# Patient Record
Sex: FEMALE | Race: WHITE | NOT HISPANIC OR LATINO | Employment: OTHER | URBAN - METROPOLITAN AREA
[De-identification: names, ages, dates, MRNs, and addresses within clinical notes are randomized per-mention and may not be internally consistent; named-entity substitution may affect disease eponyms.]

---

## 2024-09-10 ENCOUNTER — PATIENT MESSAGE (OUTPATIENT)
Dept: SURGERY | Facility: CLINIC | Age: 74
End: 2024-09-10

## 2024-09-10 PROBLEM — C50.919 INVASIVE LOBULAR CARCINOMA OF BREAST IN FEMALE: Status: ACTIVE | Noted: 2024-09-10

## 2024-09-16 ENCOUNTER — HOSPITAL ENCOUNTER (OUTPATIENT)
Dept: RADIOLOGY | Facility: HOSPITAL | Age: 74
Discharge: HOME | End: 2024-09-16
Attending: NURSE PRACTITIONER
Payer: MEDICARE

## 2024-09-16 DIAGNOSIS — C50.919 BREAST CANCER: ICD-10-CM

## 2024-09-16 PROCEDURE — 25500020 PHARM REV CODE 255: Performed by: NURSE PRACTITIONER

## 2024-09-16 PROCEDURE — C8937 CAD BREAST MRI: HCPCS | Mod: TC

## 2024-09-16 PROCEDURE — 77049 MRI BREAST C-+ W/CAD BI: CPT | Mod: TC

## 2024-09-16 PROCEDURE — 77049 MRI BREAST C-+ W/CAD BI: CPT | Mod: 26,,, | Performed by: RADIOLOGY

## 2024-09-16 PROCEDURE — A9577 INJ MULTIHANCE: HCPCS | Performed by: NURSE PRACTITIONER

## 2024-09-16 RX ADMIN — GADOBENATE DIMEGLUMINE 20 ML: 529 INJECTION, SOLUTION INTRAVENOUS at 10:09

## 2024-09-16 NOTE — PROGRESS NOTES
Breast Surgery  [facility]  Department of Surgery      REFERRING PROVIDER: Bruce Artis MD  [address]  [address]  [place] [address]    Chief Complaint: New Patient and Breast Cancer      Subjective:      Patient ID: Beth Cantu is a 74 y.o. female who presents with newly diagnosed infiltrating lobular carcinoma of the right breast, Grade 1, ER +, [place] +, HER2 -.    She initially had an abnormal screening mammogram (12/28/2023) BI-RADS 0; immediate diagnostic mammogram 02/2024 significant for Right breast asymmetry at the outer middle position, probably benign, 6 month f/u recommended.     6 month Follow-up mammogram (08/29/24) showed 1.2 cm of focal asymmetry with architectural distortion corresponding to previously suspicious area. US (08/29/34) showed 1.1 cm irregular not parallel hypoechoic mass with indistinct margins at 10:00 o'clock axis 4 cm from the nipple. These findings represent an interval change from previous imaging. MRI (09/16/24) shows 1.4 cm irregular mass in the UOQ, middle depth 3.5 CFN and 1.5 CM from the skin correlating with the known cancer. There is also a 0.6 cm oval mass with circumscribed margins in the UIQ of the LEFT breast, 6 CFM and 1.5 cm from the skin. Biopsy of the left breast findings is recommended.     A ultrasound guided biopsy was performed on 09/04/24 with pathology revealing infiltrating lobular carcinoma of the breast.     Patient does routinely do self breast exams.  Patient has not noted a change on breast exam.  Patient denies nipple discharge. Patient has to previous breast biopsy - Right breast in the 90's, after hormone therapy, aspirated, resolved after getting of hormone theraoy. Patient denies a personal history of breast cancer.    Findings at that time were the following:   Tumor size: 1.4 cm   Tumor ndgndrndanddndend:nd nd2nd Estrogen Receptor: +   Progesterone Receptor: +   Her-2 linette: -   Lymph node status: clinically negative  Lymphatic  invasion: negative     GYN History:  Age of menarche was 10. Age of menopause was 36 (hysterectomy, partial).  Patient admits to hormonal therapy (6 months). Patient is . Age of first live birth was 26. Patient did breast feed (1 month)    Past Medical History:   Diagnosis Date    Absence of both cervix and uterus, acquired     TVH    Basal cell carcinoma (BCC)         Breast cyst     Cancer     right breast    H/O bone density study 2014    Normal    H/O colonoscopy     Cologard done 10/2016, Negative    H/O mammogram     Normal      Herpes simplex virus (HSV) infection     Hypothyroidism associated with surgical procedure     Migraines     MVP (mitral valve prolapse)     Osteoarthritis     Overweight     Thyroid disease     Vaginal Pap smear     Normal     Past Surgical History:   Procedure Laterality Date    ARTHROPLASTY, KNEE, TOTAL, SIGHT ASSISTED Right 2021    Procedure: ARTHROPLASTY, KNEE, TOTAL, SIGHT ASSISTED;  Surgeon: Dionicio Fonseca MD;  Location: [facility];  Service: Orthopedics;  Laterality: Right;    BREAST CYST ASPIRATION      CHOLECYSTECTOMY      COLONOSCOPY      DILATION AND CURETTAGE OF UTERUS      Missed Ab    HYSTERECTOMY      TVH - menorrhagia    JOINT REPLACEMENT      left knee    KNEE SURGERY Left 2013    Replacement - In [place]    THYROIDECTOMY  2013    Partial 1973    TONSILLECTOMY      TYMPANOSTOMY TUBE PLACEMENT Left      No current facility-administered medications on file prior to visit.     Current Outpatient Medications on File Prior to Visit   Medication Sig Dispense Refill    levothyroxine (SYNTHROID) 150 MCG tablet Take 150 mcg by mouth once daily.      multivitamin capsule Take 1 capsule by mouth once daily.      ondansetron (ZOFRAN-ODT) 8 MG TbDL Take 1 tablet (8 mg total) by mouth every 8 (eight) hours as needed (Nausea). 20 tablet 1    propranoloL (INDERAL LA) 120 MG 24 hr capsule Inderal LA   120 mg      sumatriptan (IMITREX) 25 MG  Tab       levothyroxine (SYNTHROID, LEVOTHROID) 175 MCG tablet Synthroid 175 mcg tablet   Take 1 tablet every day by oral route.       Social History     Socioeconomic History    Marital status:      Spouse name: Klaus    Number of children: 3   Tobacco Use    Smoking status: Never    Smokeless tobacco: Never   Substance and Sexual Activity    Alcohol use: Yes     Alcohol/week: 7.0 standard drinks of alcohol     Types: 7 Glasses of wine per week     Comment: Socially - Wine    Drug use: No    Sexual activity: Yes     Partners: Male     Birth control/protection: Surgical     Comment: :  Klaus   Social History Narrative        Retired    3 adult sons (Luis, Philip, Beny), 6 grandchildren     Social Drivers of Health     Financial Resource Strain: Patient Declined (10/4/2024)    Overall Financial Resource Strain (CARDIA)     Difficulty of Paying Living Expenses: Patient declined   Food Insecurity: Patient Declined (10/4/2024)    Hunger Vital Sign     Worried About Running Out of Food in the Last Year: Patient declined     Ran Out of Food in the Last Year: Patient declined     Family History   Problem Relation Name Age of Onset    Cirrhosis Father      Alzheimer's disease Father      Emphysema Father      Pneumonia Mother      Cancer Maternal Aunt      Cancer Maternal Uncle      Cancer Maternal Cousin      Breast cancer Maternal Cousin  30        mastectomy    Cancer Maternal Cousin      Cancer Maternal Cousin      Colon cancer Neg Hx      Ovarian cancer Neg Hx          Review of Systems   Constitutional:  Negative for chills and fever.   Respiratory:  Negative for chest tightness and shortness of breath.    Cardiovascular:  Negative for chest pain and palpitations.   Gastrointestinal:  Negative for abdominal pain and blood in stool.   Skin:  Negative for rash and wound.   Neurological:  Positive for headaches (migranes, chronic). Negative for syncope.   Psychiatric/Behavioral:  Negative for  "confusion.      Objective:   /66   Pulse 80   Ht 5' 7" (1.702 m)   Wt 106.1 kg (234 lb)   LMP 02/17/1986 (Approximate) Comment: TVH 1986  BMI 36.65 kg/m²     Physical Exam   HENT:   Head: Normocephalic and atraumatic.   Eyes: Conjunctivae are normal. No scleral icterus.   Cardiovascular:  Normal rate, regular rhythm and normal pulses.            Pulmonary/Chest: Effort normal and breath sounds normal. No accessory muscle usage or stridor. No respiratory distress. She has no wheezes. Right breast exhibits no inverted nipple, no mass, no nipple discharge and no skin change. Left breast exhibits no inverted nipple, no mass, no nipple discharge and no skin change.   Abdominal: Soft. Normal appearance. She exhibits no mass.   Musculoskeletal: No deformity. Lymphadenopathy:      Cervical: No cervical adenopathy.      Upper Body:      Right upper body: No supraclavicular or axillary adenopathy.      Left upper body: No supraclavicular or axillary adenopathy.     Neurological: She is alert.   Skin: Skin is warm and dry.     Psychiatric: Mood and judgment normal.       Radiology review: Images personally reviewed by me in the clinic.   Result:   Mammo Digital Diagnostic Right with Ankit  US Breast Right Limited     History:  Follow-up for a right breast finding initially described on screening mammogram December 28, 2023.     Films Compared:  Prior images (if available) were compared.     Findings:  This procedure was performed using tomosynthesis. Computer-aided detection was utilized in the interpretation of this examination.     Mammo Digital Diagnostic Right with Ankit  The right breast has scattered areas of fibroglandular density.      In the right breast upper outer quadrant middle depth, there is a focal asymmetry with architectural distortion spanning 1.2 cm.  This corresponds to the previously described finding recommended for follow-up, and represents an interval change.  This finding remains subtle by " mammogram and is visible mainly in the CC projection.     Limited right breast ultrasound:   In the right breast 10:00 o'clock axis 4 cm from the nipple, there is an irregular not parallel hypoechoic mass with indistinct margins measuring 1.1 x 1.0 x 0.9 cm.  Associated posterior acoustic shadowing and rim hypervascularity.  This corresponds to the right breast mammographic finding and finding recommended for follow-up, and represents an interval change as this finding was not previously seen by ultrasound.     No right axillary adenopathy.     Impression:  Since February 6, 2024, the previously described right breast finding is more conspicuous but still subtle by mammogram, and now clearly visualized on ultrasound as a mass.  BI-RADS 4: Suspicious.  Recommend ultrasound-guided biopsy.     BI-RADS Category:   Overall: 4 - Suspicious     Recommendation:  Ultrasound-guided biopsy for the right breast 10:00 o'clock axis 4 cm from the nipple mass.       The findings and recommendations were discussed directly with her by Dr. CUCA Norris at the time of interpretation.     Your estimated lifetime risk of breast cancer (to age 85) based on Tyrer-Cuzick risk assessment model is 2.62%.  According to the American Cancer Society, patients with a lifetime breast cancer risk of 20% or higher might benefit from supplemental screening tests, such as screening breast MRI.    MRI (09/16/24)  Result:   MRI Breast w/wo Contrast, w/CAD, Bilateral     History:  Patient is 74 y.o. and is seen for biopsy proven right breast cancer.        Films Compared:  Prior images (if available) were compared.     Technique:  A routine breast MRI was performed with a dedicated breast coil. Pre-contrast STIR were acquired. Then, pre and post contrast T1 weighted fat saturated images were acquired and subtracted with MIP reconstruction.  10mL of intravenous gadolinium contrast was administered. The study was reviewed with [employer] software.      Findings:  The breasts have scattered fibroglandular tissue. The background parenchymal enhancement is minimal and symmetric.      There is no suspicious axillary or internal mammary chain lymphadenopathy.     Left  There is a 0.6 cm x 0.5 cm x 0.6 cm oval mass with circumscribed margins and homogeneous internal enhancement seen in the upper inner quadrant of the left breast in the middle depth, 6 cm from the nipple and 1.5 cm from the skin, which is isointense on T2 weighted images. Kinetics initial phase is slow. Delayed phase is persistent.  This mass is best appreciated on axial postcontrast image 63 of 128.      Right  There is a 1.4 cm x 1.1 cm x 1.1 cm irregularly shaped mass with irregular margins and heterogeneous internal enhancement seen in the upper outer quadrant of the right breast in the middle depth, 3.4 cm from the nipple and 1.5 cm from the skin. The mass correlates with known cancer. Kinetics initial phase is slow. Delayed phase is persistent. There is an associated clip.      Impression:  Left  Mass: Left breast 0.6 cm x 0.5 cm x 0.6 cm mass at the upper inner position and middle depth. Assessment: 4 - Suspicious finding. Biopsy is recommended.      Right  Mass: Right breast 1.4 cm x 1.1 cm x 1.1 cm mass at the upper outer position and middle depth. Assessment: 6 - Known biopsy, proven malignancy. Surgical Consult is recommended.      BI-RADS Category:   Overall: 4 - Suspicious     Recommendation:  Biopsy is recommended.  Surgical Consult is recommended for the right breast.    Assessment:       1. Malignant neoplasm of upper-outer quadrant of right breast in female, estrogen receptor positive    2. Invasive lobular carcinoma of breast in female        Plan:     Options for management were discussed with the patient and her family. We reviewed the existing data noting the equivalency of breast conserving surgery with radiation therapy and mastectomy. We also reviewed the guidelines of the  National Comprehensive Cancer Network for Stage 1 breast carcinoma. We discussed the need for lumpectomy margins 1to be negative for carcinoma, the necessity for postoperative radiation therapy after breast conservation in most cases, the possibility of a failed or false negative sentinel lymph node biopsy and the potential need for complete lymphadenectomy for a failed or positive sentinel lymph node biopsy were fully discussed. In the setting of mastectomy, delayed or immediate reconstruction options are available and were discussed.     In the setting of lumpectomy, radiation therapy would be recommended majority of the time.  The duration and treatment side effects were discussed with the patient.  This will coordinated with the radiation oncologist pending final pathology.    We also discussed the role of systemic therapy in the treatment of early stage breast cancer.  We discussed that this is based on tumor biology and vicki status and will be determined based on final pathology.  We discussed that if the cancer is hormone positive, endocrine therapy would be recommended in most cases and its use can reduce the risk of recurrence as well as improve survival. Side effects of treatment were briefly discussed. We also discussed the potential role for chemotherapy based on a number of factors such as tumor phenotype (ER+ vs. triple negative vs. [id number]+) and this would be determined in coordination with the medical oncologist.    MRI  Genetics  The patient, in consultation with her family, has elected to proceed with right partial mastectomy and sentinel lymph node biopsy. The operative risks of bleeding, infection, recurrence, scarring, and anesthetic complications and the possibility of requiring further surgery were all noted and informed consent obtained.    Surgery scheduled. Follow-up in clinic roughly 14 days after surgery.     Patient was educated on breast cancer, receptors, wire localization lumpectomy,  mastectomy, sentinel lymph node mapping and biopsy, axillary lymph node dissection, reconstruction, breast prosthesis with post-mastectomy bra and radiation therapy. Patient was given patient information binder including [facility] breast cancer treatment brochure.  All her questions were answered.      Total time spent with the patient: 65.  45 minutes of face to face consultation and 20 minutes of chart review and coordination of care.

## 2024-09-16 NOTE — H&P (VIEW-ONLY)
Breast Surgery  [facility]  Department of Surgery      REFERRING PROVIDER: Bruce Artis MD  [address]  [address]  [place] [address]    Chief Complaint: New Patient and Breast Cancer      Subjective:      Patient ID: Beth Cantu is a 74 y.o. female who presents with newly diagnosed infiltrating lobular carcinoma of the right breast, Grade 1, ER +, IL +, HER2 -.    She initially had an abnormal screening mammogram (12/28/2023) BI-RADS 0; immediate diagnostic mammogram 02/2024 significant for Right breast asymmetry at the outer middle position, probably benign, 6 month f/u recommended.     6 month Follow-up mammogram (08/29/24) showed 1.2 cm of focal asymmetry with architectural distortion corresponding to previously suspicious area. US (08/29/34) showed 1.1 cm irregular not parallel hypoechoic mass with indistinct margins at 10:00 o'clock axis 4 cm from the nipple. These findings represent an interval change from previous imaging. MRI (09/16/24) shows 1.4 cm irregular mass in the UOQ, middle depth 3.5 CFN and 1.5 CM from the skin correlating with the known cancer. There is also a 0.6 cm oval mass with circumscribed margins in the UIQ of the LEFT breast, 6 CFM and 1.5 cm from the skin. Biopsy of the left breast findings is recommended.     A ultrasound guided biopsy was performed on 09/04/24 with pathology revealing infiltrating lobular carcinoma of the breast.     Patient does routinely do self breast exams.  Patient has not noted a change on breast exam.  Patient denies nipple discharge. Patient has to previous breast biopsy - Right breast in the 90's, after hormone therapy, aspirated, resolved after getting of hormone theraoy. Patient denies a personal history of breast cancer.    Findings at that time were the following:   Tumor size: 1.4 cm   Tumor ndgndrndanddndend:nd nd2nd Estrogen Receptor: +   Progesterone Receptor: +   Her-2 linette: -   Lymph node status: clinically negative  Lymphatic  invasion: negative     GYN History:  Age of menarche was 10. Age of menopause was 36 (hysterectomy, partial).  Patient admits to hormonal therapy (6 months). Patient is . Age of first live birth was 26. Patient did breast feed (1 month)    Past Medical History:   Diagnosis Date    Absence of both cervix and uterus, acquired     TVH    Basal cell carcinoma (BCC)         Breast cyst     Cancer     right breast    H/O bone density study 2014    Normal    H/O colonoscopy     Cologard done 10/2016, Negative    H/O mammogram     Normal      Herpes simplex virus (HSV) infection     Hypothyroidism associated with surgical procedure     Migraines     MVP (mitral valve prolapse)     Osteoarthritis     Overweight     Thyroid disease     Vaginal Pap smear     Normal     Past Surgical History:   Procedure Laterality Date    ARTHROPLASTY, KNEE, TOTAL, SIGHT ASSISTED Right 2021    Procedure: ARTHROPLASTY, KNEE, TOTAL, SIGHT ASSISTED;  Surgeon: Dionicio Fonseca MD;  Location: [facility];  Service: Orthopedics;  Laterality: Right;    BREAST CYST ASPIRATION      CHOLECYSTECTOMY      COLONOSCOPY      DILATION AND CURETTAGE OF UTERUS      Missed Ab    HYSTERECTOMY      TVH - menorrhagia    JOINT REPLACEMENT      left knee    KNEE SURGERY Left 2013    Replacement - In [place]    THYROIDECTOMY  2013    Partial 1973    TONSILLECTOMY      TYMPANOSTOMY TUBE PLACEMENT Left      No current facility-administered medications on file prior to visit.     Current Outpatient Medications on File Prior to Visit   Medication Sig Dispense Refill    levothyroxine (SYNTHROID) 150 MCG tablet Take 150 mcg by mouth once daily.      multivitamin capsule Take 1 capsule by mouth once daily.      ondansetron (ZOFRAN-ODT) 8 MG TbDL Take 1 tablet (8 mg total) by mouth every 8 (eight) hours as needed (Nausea). 20 tablet 1    propranoloL (INDERAL LA) 120 MG 24 hr capsule Inderal LA   120 mg      sumatriptan (IMITREX) 25 MG  Tab       levothyroxine (SYNTHROID, LEVOTHROID) 175 MCG tablet Synthroid 175 mcg tablet   Take 1 tablet every day by oral route.       Social History     Socioeconomic History    Marital status:      Spouse name: Klaus    Number of children: 3   Tobacco Use    Smoking status: Never    Smokeless tobacco: Never   Substance and Sexual Activity    Alcohol use: Yes     Alcohol/week: 7.0 standard drinks of alcohol     Types: 7 Glasses of wine per week     Comment: Socially - Wine    Drug use: No    Sexual activity: Yes     Partners: Male     Birth control/protection: Surgical     Comment: :  Klaus   Social History Narrative        Retired    3 adult sons (Luis, Philip, Beny), 6 grandchildren     Social Drivers of Health     Financial Resource Strain: Patient Declined (10/4/2024)    Overall Financial Resource Strain (CARDIA)     Difficulty of Paying Living Expenses: Patient declined   Food Insecurity: Patient Declined (10/4/2024)    Hunger Vital Sign     Worried About Running Out of Food in the Last Year: Patient declined     Ran Out of Food in the Last Year: Patient declined     Family History   Problem Relation Name Age of Onset    Cirrhosis Father      Alzheimer's disease Father      Emphysema Father      Pneumonia Mother      Cancer Maternal Aunt      Cancer Maternal Uncle      Cancer Maternal Cousin      Breast cancer Maternal Cousin  30        mastectomy    Cancer Maternal Cousin      Cancer Maternal Cousin      Colon cancer Neg Hx      Ovarian cancer Neg Hx          Review of Systems   Constitutional:  Negative for chills and fever.   Respiratory:  Negative for chest tightness and shortness of breath.    Cardiovascular:  Negative for chest pain and palpitations.   Gastrointestinal:  Negative for abdominal pain and blood in stool.   Skin:  Negative for rash and wound.   Neurological:  Positive for headaches (migranes, chronic). Negative for syncope.   Psychiatric/Behavioral:  Negative for  "confusion.      Objective:   /66   Pulse 80   Ht 5' 7" (1.702 m)   Wt 106.1 kg (234 lb)   LMP 02/17/1986 (Approximate) Comment: TVH 1986  BMI 36.65 kg/m²     Physical Exam   HENT:   Head: Normocephalic and atraumatic.   Eyes: Conjunctivae are normal. No scleral icterus.   Cardiovascular:  Normal rate, regular rhythm and normal pulses.            Pulmonary/Chest: Effort normal and breath sounds normal. No accessory muscle usage or stridor. No respiratory distress. She has no wheezes. Right breast exhibits no inverted nipple, no mass, no nipple discharge and no skin change. Left breast exhibits no inverted nipple, no mass, no nipple discharge and no skin change.   Abdominal: Soft. Normal appearance. She exhibits no mass.   Musculoskeletal: No deformity. Lymphadenopathy:      Cervical: No cervical adenopathy.      Upper Body:      Right upper body: No supraclavicular or axillary adenopathy.      Left upper body: No supraclavicular or axillary adenopathy.     Neurological: She is alert.   Skin: Skin is warm and dry.     Psychiatric: Mood and judgment normal.       Radiology review: Images personally reviewed by me in the clinic.   Result:   Mammo Digital Diagnostic Right with Ankit  US Breast Right Limited     History:  Follow-up for a right breast finding initially described on screening mammogram December 28, 2023.     Films Compared:  Prior images (if available) were compared.     Findings:  This procedure was performed using tomosynthesis. Computer-aided detection was utilized in the interpretation of this examination.     Mammo Digital Diagnostic Right with Ankit  The right breast has scattered areas of fibroglandular density.      In the right breast upper outer quadrant middle depth, there is a focal asymmetry with architectural distortion spanning 1.2 cm.  This corresponds to the previously described finding recommended for follow-up, and represents an interval change.  This finding remains subtle by " mammogram and is visible mainly in the CC projection.     Limited right breast ultrasound:   In the right breast 10:00 o'clock axis 4 cm from the nipple, there is an irregular not parallel hypoechoic mass with indistinct margins measuring 1.1 x 1.0 x 0.9 cm.  Associated posterior acoustic shadowing and rim hypervascularity.  This corresponds to the right breast mammographic finding and finding recommended for follow-up, and represents an interval change as this finding was not previously seen by ultrasound.     No right axillary adenopathy.     Impression:  Since February 6, 2024, the previously described right breast finding is more conspicuous but still subtle by mammogram, and now clearly visualized on ultrasound as a mass.  BI-RADS 4: Suspicious.  Recommend ultrasound-guided biopsy.     BI-RADS Category:   Overall: 4 - Suspicious     Recommendation:  Ultrasound-guided biopsy for the right breast 10:00 o'clock axis 4 cm from the nipple mass.       The findings and recommendations were discussed directly with her by Dr. CUCA Norris at the time of interpretation.     Your estimated lifetime risk of breast cancer (to age 85) based on Tyrer-Cuzick risk assessment model is 2.62%.  According to the American Cancer Society, patients with a lifetime breast cancer risk of 20% or higher might benefit from supplemental screening tests, such as screening breast MRI.    MRI (09/16/24)  Result:   MRI Breast w/wo Contrast, w/CAD, Bilateral     History:  Patient is 74 y.o. and is seen for biopsy proven right breast cancer.        Films Compared:  Prior images (if available) were compared.     Technique:  A routine breast MRI was performed with a dedicated breast coil. Pre-contrast STIR were acquired. Then, pre and post contrast T1 weighted fat saturated images were acquired and subtracted with MIP reconstruction.  10mL of intravenous gadolinium contrast was administered. The study was reviewed with DartPoints software.      Findings:  The breasts have scattered fibroglandular tissue. The background parenchymal enhancement is minimal and symmetric.      There is no suspicious axillary or internal mammary chain lymphadenopathy.     Left  There is a 0.6 cm x 0.5 cm x 0.6 cm oval mass with circumscribed margins and homogeneous internal enhancement seen in the upper inner quadrant of the left breast in the middle depth, 6 cm from the nipple and 1.5 cm from the skin, which is isointense on T2 weighted images. Kinetics initial phase is slow. Delayed phase is persistent.  This mass is best appreciated on axial postcontrast image 63 of 128.      Right  There is a 1.4 cm x 1.1 cm x 1.1 cm irregularly shaped mass with irregular margins and heterogeneous internal enhancement seen in the upper outer quadrant of the right breast in the middle depth, 3.4 cm from the nipple and 1.5 cm from the skin. The mass correlates with known cancer. Kinetics initial phase is slow. Delayed phase is persistent. There is an associated clip.      Impression:  Left  Mass: Left breast 0.6 cm x 0.5 cm x 0.6 cm mass at the upper inner position and middle depth. Assessment: 4 - Suspicious finding. Biopsy is recommended.      Right  Mass: Right breast 1.4 cm x 1.1 cm x 1.1 cm mass at the upper outer position and middle depth. Assessment: 6 - Known biopsy, proven malignancy. Surgical Consult is recommended.      BI-RADS Category:   Overall: 4 - Suspicious     Recommendation:  Biopsy is recommended.  Surgical Consult is recommended for the right breast.    Assessment:       1. Malignant neoplasm of upper-outer quadrant of right breast in female, estrogen receptor positive    2. Invasive lobular carcinoma of breast in female        Plan:     Options for management were discussed with the patient and her family. We reviewed the existing data noting the equivalency of breast conserving surgery with radiation therapy and mastectomy. We also reviewed the guidelines of the  National Comprehensive Cancer Network for Stage 1 breast carcinoma. We discussed the need for lumpectomy margins 1to be negative for carcinoma, the necessity for postoperative radiation therapy after breast conservation in most cases, the possibility of a failed or false negative sentinel lymph node biopsy and the potential need for complete lymphadenectomy for a failed or positive sentinel lymph node biopsy were fully discussed. In the setting of mastectomy, delayed or immediate reconstruction options are available and were discussed.     In the setting of lumpectomy, radiation therapy would be recommended majority of the time.  The duration and treatment side effects were discussed with the patient.  This will coordinated with the radiation oncologist pending final pathology.    We also discussed the role of systemic therapy in the treatment of early stage breast cancer.  We discussed that this is based on tumor biology and vicki status and will be determined based on final pathology.  We discussed that if the cancer is hormone positive, endocrine therapy would be recommended in most cases and its use can reduce the risk of recurrence as well as improve survival. Side effects of treatment were briefly discussed. We also discussed the potential role for chemotherapy based on a number of factors such as tumor phenotype (ER+ vs. triple negative vs. Kkz2hch+) and this would be determined in coordination with the medical oncologist.    MRI  Genetics  The patient, in consultation with her family, has elected to proceed with right partial mastectomy and sentinel lymph node biopsy. The operative risks of bleeding, infection, recurrence, scarring, and anesthetic complications and the possibility of requiring further surgery were all noted and informed consent obtained.    Surgery scheduled. Follow-up in clinic roughly 14 days after surgery.     Patient was educated on breast cancer, receptors, wire localization lumpectomy,  mastectomy, sentinel lymph node mapping and biopsy, axillary lymph node dissection, reconstruction, breast prosthesis with post-mastectomy bra and radiation therapy. Patient was given patient information binder including [facility] breast cancer treatment brochure.  All her questions were answered.      Total time spent with the patient: 65.  45 minutes of face to face consultation and 20 minutes of chart review and coordination of care.

## 2024-09-17 ENCOUNTER — DOCUMENTATION ONLY (OUTPATIENT)
Dept: SURGERY | Facility: CLINIC | Age: 74
End: 2024-09-17
Payer: MEDICARE

## 2024-09-17 ENCOUNTER — LAB VISIT (OUTPATIENT)
Dept: LAB | Facility: HOSPITAL | Age: 74
End: 2024-09-17
Attending: SURGERY
Payer: MEDICARE

## 2024-09-17 ENCOUNTER — OFFICE VISIT (OUTPATIENT)
Dept: SURGERY | Facility: CLINIC | Age: 74
End: 2024-09-17
Payer: MEDICARE

## 2024-09-17 VITALS
HEIGHT: 67 IN | BODY MASS INDEX: 36.73 KG/M2 | DIASTOLIC BLOOD PRESSURE: 66 MMHG | SYSTOLIC BLOOD PRESSURE: 119 MMHG | WEIGHT: 234 LBS | HEART RATE: 80 BPM

## 2024-09-17 DIAGNOSIS — Z17.0 MALIGNANT NEOPLASM OF UPPER-OUTER QUADRANT OF RIGHT BREAST IN FEMALE, ESTROGEN RECEPTOR POSITIVE: ICD-10-CM

## 2024-09-17 DIAGNOSIS — C50.411 MALIGNANT NEOPLASM OF UPPER-OUTER QUADRANT OF RIGHT BREAST IN FEMALE, ESTROGEN RECEPTOR POSITIVE: Primary | ICD-10-CM

## 2024-09-17 DIAGNOSIS — C50.919 INVASIVE LOBULAR CARCINOMA OF BREAST IN FEMALE: ICD-10-CM

## 2024-09-17 DIAGNOSIS — C50.411 MALIGNANT NEOPLASM OF UPPER-OUTER QUADRANT OF RIGHT BREAST IN FEMALE, ESTROGEN RECEPTOR POSITIVE: ICD-10-CM

## 2024-09-17 DIAGNOSIS — Z17.0 MALIGNANT NEOPLASM OF UPPER-OUTER QUADRANT OF RIGHT BREAST IN FEMALE, ESTROGEN RECEPTOR POSITIVE: Primary | ICD-10-CM

## 2024-09-17 LAB
ALBUMIN SERPL BCP-MCNC: 3.9 G/DL (ref 3.5–5.2)
ALP SERPL-CCNC: 88 U/L (ref 55–135)
ALT SERPL W/O P-5'-P-CCNC: 29 U/L (ref 10–44)
ANION GAP SERPL CALC-SCNC: 8 MMOL/L (ref 8–16)
AST SERPL-CCNC: 21 U/L (ref 10–40)
BASOPHILS # BLD AUTO: 0.09 K/UL (ref 0–0.2)
BASOPHILS NFR BLD: 1.2 % (ref 0–1.9)
BILIRUB SERPL-MCNC: 0.5 MG/DL (ref 0.1–1)
BUN SERPL-MCNC: 9 MG/DL (ref 8–23)
CALCIUM SERPL-MCNC: 8.8 MG/DL (ref 8.7–10.5)
CHLORIDE SERPL-SCNC: 104 MMOL/L (ref 95–110)
CO2 SERPL-SCNC: 23 MMOL/L (ref 23–29)
CREAT SERPL-MCNC: 0.8 MG/DL (ref 0.5–1.4)
DIFFERENTIAL METHOD BLD: ABNORMAL
EOSINOPHIL # BLD AUTO: 0.2 K/UL (ref 0–0.5)
EOSINOPHIL NFR BLD: 2.3 % (ref 0–8)
ERYTHROCYTE [DISTWIDTH] IN BLOOD BY AUTOMATED COUNT: 12.7 % (ref 11.5–14.5)
EST. GFR  (NO RACE VARIABLE): >60 ML/MIN/1.73 M^2
GLUCOSE SERPL-MCNC: 97 MG/DL (ref 70–110)
HCT VFR BLD AUTO: 38.2 % (ref 37–48.5)
HGB BLD-MCNC: 13.9 G/DL (ref 12–16)
IMM GRANULOCYTES # BLD AUTO: 0.03 K/UL (ref 0–0.04)
IMM GRANULOCYTES NFR BLD AUTO: 0.4 % (ref 0–0.5)
LYMPHOCYTES # BLD AUTO: 2.1 K/UL (ref 1–4.8)
LYMPHOCYTES NFR BLD: 28 % (ref 18–48)
MCH RBC QN AUTO: 34.8 PG (ref 27–31)
MCHC RBC AUTO-ENTMCNC: 36.4 G/DL (ref 32–36)
MCV RBC AUTO: 96 FL (ref 82–98)
MONOCYTES # BLD AUTO: 1 K/UL (ref 0.3–1)
MONOCYTES NFR BLD: 13.3 % (ref 4–15)
NEUTROPHILS # BLD AUTO: 4.1 K/UL (ref 1.8–7.7)
NEUTROPHILS NFR BLD: 54.8 % (ref 38–73)
NRBC BLD-RTO: 0 /100 WBC
PLATELET # BLD AUTO: 280 K/UL (ref 150–450)
PMV BLD AUTO: 12.3 FL (ref 9.2–12.9)
POTASSIUM SERPL-SCNC: 4.3 MMOL/L (ref 3.5–5.1)
PROT SERPL-MCNC: 6.8 G/DL (ref 6–8.4)
RBC # BLD AUTO: 3.99 M/UL (ref 4–5.4)
SODIUM SERPL-SCNC: 135 MMOL/L (ref 136–145)
WBC # BLD AUTO: 7.43 K/UL (ref 3.9–12.7)

## 2024-09-17 PROCEDURE — 99999 PR PBB SHADOW E&M-EST. PATIENT-LVL IV: CPT | Mod: PBBFAC,,, | Performed by: SURGERY

## 2024-09-17 PROCEDURE — 99214 OFFICE O/P EST MOD 30 MIN: CPT | Mod: PBBFAC | Performed by: SURGERY

## 2024-09-17 PROCEDURE — 36415 COLL VENOUS BLD VENIPUNCTURE: CPT | Performed by: SURGERY

## 2024-09-17 PROCEDURE — 85025 COMPLETE CBC W/AUTO DIFF WBC: CPT | Performed by: SURGERY

## 2024-09-17 PROCEDURE — 80053 COMPREHEN METABOLIC PANEL: CPT | Performed by: SURGERY

## 2024-09-17 NOTE — PROGRESS NOTES
Genetics Lay Navigator Note:    Nurse Navigator : RAIZA Killian RN    Met with patient at her consult with Dr. Mims (9/17/2024),  to facilitate genetic testing. Set patient up with [facility] genetic counselor over the phone to complete counseling prior to testing. Patient verbalized understanding to all counseling information. [facility] brochure with number to call with questions or concerns provided to patient as well as my card. Encouraged patient to call me or [facility] at any time.     Lab appointment made and patient escorted with [facility] kit to lab for specimen draw and processing. Patient instructed that results will be provided as soon as they are available. No questions or concerns from patient about plan of care.       [employer] Genetic Pedigree & TRF scanned in media and attached to this documentation note.    FIGMD Tracking # [id number]

## 2024-09-18 ENCOUNTER — PATIENT MESSAGE (OUTPATIENT)
Dept: SURGERY | Facility: CLINIC | Age: 74
End: 2024-09-18
Payer: MEDICARE

## 2024-09-18 ENCOUNTER — TELEPHONE (OUTPATIENT)
Dept: RADIOLOGY | Facility: HOSPITAL | Age: 74
End: 2024-09-18
Payer: MEDICARE

## 2024-09-18 RX ORDER — SODIUM CHLORIDE, SODIUM LACTATE, POTASSIUM CHLORIDE, CALCIUM CHLORIDE 600; 310; 30; 20 MG/100ML; MG/100ML; MG/100ML; MG/100ML
INJECTION, SOLUTION INTRAVENOUS CONTINUOUS
OUTPATIENT
Start: 2024-09-18

## 2024-09-18 RX ORDER — ACETAMINOPHEN 325 MG/1
975 TABLET ORAL
OUTPATIENT
Start: 2024-09-18 | End: 2024-09-18

## 2024-09-18 RX ORDER — CLINDAMYCIN PHOSPHATE 900 MG/50ML
900 INJECTION, SOLUTION INTRAVENOUS
Status: CANCELLED | OUTPATIENT
Start: 2024-09-18

## 2024-09-18 RX ORDER — LIDOCAINE HYDROCHLORIDE 10 MG/ML
0.5 INJECTION, SOLUTION EPIDURAL; INFILTRATION; INTRACAUDAL; PERINEURAL ONCE
OUTPATIENT
Start: 2024-09-18 | End: 2024-09-18

## 2024-09-18 RX ORDER — SODIUM CHLORIDE 9 MG/ML
INJECTION, SOLUTION INTRAVENOUS CONTINUOUS
Status: CANCELLED | OUTPATIENT
Start: 2024-09-18

## 2024-09-18 RX ORDER — FAMOTIDINE 20 MG/1
20 TABLET, FILM COATED ORAL
OUTPATIENT
Start: 2024-09-18 | End: 2024-09-18

## 2024-09-20 ENCOUNTER — DOCUMENTATION ONLY (OUTPATIENT)
Dept: SURGERY | Facility: CLINIC | Age: 74
End: 2024-09-20
Payer: MEDICARE

## 2024-09-20 ENCOUNTER — TELEPHONE (OUTPATIENT)
Dept: PSYCHIATRY | Facility: CLINIC | Age: 74
End: 2024-09-20
Payer: MEDICARE

## 2024-09-20 ENCOUNTER — TELEPHONE (OUTPATIENT)
Dept: HEMATOLOGY/ONCOLOGY | Facility: CLINIC | Age: 74
End: 2024-09-20
Payer: MEDICARE

## 2024-09-20 ENCOUNTER — HOSPITAL ENCOUNTER (OUTPATIENT)
Dept: PREADMISSION TESTING | Facility: OTHER | Age: 74
Discharge: HOME | End: 2024-09-20
Attending: SURGERY
Payer: MEDICARE

## 2024-09-20 VITALS
OXYGEN SATURATION: 96 % | RESPIRATION RATE: 18 BRPM | DIASTOLIC BLOOD PRESSURE: 68 MMHG | SYSTOLIC BLOOD PRESSURE: 132 MMHG | HEART RATE: 72 BPM | TEMPERATURE: 97 F | BODY MASS INDEX: 36.73 KG/M2 | WEIGHT: 234 LBS | HEIGHT: 67 IN

## 2024-09-20 DIAGNOSIS — Z17.0 MALIGNANT NEOPLASM OF UPPER-OUTER QUADRANT OF RIGHT BREAST IN FEMALE, ESTROGEN RECEPTOR POSITIVE: Primary | ICD-10-CM

## 2024-09-20 DIAGNOSIS — C50.411 MALIGNANT NEOPLASM OF UPPER-OUTER QUADRANT OF RIGHT BREAST IN FEMALE, ESTROGEN RECEPTOR POSITIVE: Primary | ICD-10-CM

## 2024-09-20 DIAGNOSIS — C50.411 MALIGNANT NEOPLASM OF UPPER-OUTER QUADRANT OF RIGHT BREAST IN FEMALE, ESTROGEN RECEPTOR POSITIVE: ICD-10-CM

## 2024-09-20 DIAGNOSIS — Z17.0 MALIGNANT NEOPLASM OF UPPER-OUTER QUADRANT OF RIGHT BREAST IN FEMALE, ESTROGEN RECEPTOR POSITIVE: ICD-10-CM

## 2024-09-20 PROCEDURE — 93005 ELECTROCARDIOGRAM TRACING: CPT

## 2024-09-20 PROCEDURE — 93010 ELECTROCARDIOGRAM REPORT: CPT | Mod: ,,, | Performed by: INTERNAL MEDICINE

## 2024-09-20 RX ORDER — LORATADINE 10 MG/1
10 TABLET ORAL DAILY
COMMUNITY

## 2024-09-20 RX ORDER — CHOLECALCIFEROL (VITAMIN D3) 25 MCG
1000 TABLET ORAL DAILY
COMMUNITY

## 2024-09-20 RX ORDER — MULTIVIT WITH MINERALS/HERBS
1 TABLET ORAL DAILY
COMMUNITY

## 2024-09-20 NOTE — NURSING
SNOW Tobar discussed referral to Integrative Oncology including benefits, objectives and goals of program.     Patient notes anxiety related to shock of diagnosis, MRI work-up and adjustment to dx. Notes it has been hard on her  as well.     Patient has alprazolam for insomnia which is working, but she would like to incorporate other recommendations for anxiety and stress reduction as well as sleep.     Patient is interested in group yoga. Notes BL TKR which makes it difficult to rise from the floor. Recommended chair for group yoga. Will send information for classes and welcomed  to attend.     Discussed dietitian, acupuncture, meditation and OT yoga. Explained anti-inflammatory approach to care. Patient appreciateive. Virtual access confirmed w/visit scheduled Wed 9/25/24 at 1330 with Joanna Dubinsky, PA. SNOW Morelos.

## 2024-09-20 NOTE — NURSING
Nurse Navigator Note:     Met with patient during her consult with Dr. Mims.  Patient and I reviewed the information she discussed with Dr. Mims, including treatment options, diagnosis, and future plans for workup. Patient and I went through the new patient booklet, explained some of the information and why it is provided.     Also offered patient consults with our other specialty clinics: Integrative Oncology, Survivorship and/or Women's Gynecologic needs, our breast physical therapy department for pre-op and post-operative assessments, Oncologic Psychology for psychological support, and Oncologic Nutrition for nutritional counseling. Explained to patient that all of these support services are completely optional. Discussed that physical therapy may call patient to offer pre-op appt, and what that appt would entail.     Patient was given a copy of her appointments, Dr. Mims's card, and my card. Encouraged her to call me if she has any questions or concerns or would like to schedule any additional appointments. Verbalized understanding of all information.    Genetics done at visit. PT, integrative oncology and psychology referral placed. E mail added to support group.  Oncology Navigation   Intake  Date of Diagnosis: 09/04/24  Cancer Type: Breast  Type of Referral: Internal  Date of Referral: 09/09/24  Initial Nurse Navigator Contact: 09/09/24  Referral to Initial Contact Timeline (days): 0  First Appointment Available: 09/10/24  Appointment Date: 09/10/24  First Available Date vs. Scheduled Date (days): 0     Treatment  Current Status: Active    Surgery: Planned  Surgical Oncologist: Dr. Mims  Type of Surgery: Right lumpectomy with SLNB  Consult Date: 09/17/24  Surgery Schedule Date: 10/18/24          Procedures: Genetic test  Biopsy Schedule Date: 09/04/24  Genetic Testing Date Sent: 09/17/24    Physical Therapy Referral Date: 09/20/24    ER: Positive  DC: Positive  Her2: Negative       Support Systems:  Spouse/significant other     Acuity      Follow Up  Follow up in about 6 weeks (around 10/29/2024) for f/u post op.

## 2024-09-20 NOTE — DISCHARGE INSTRUCTIONS
Information to Prepare you for your Surgery    PRE-ADMIT TESTING -  [phone]    [address]  [facility]          Your surgery has been scheduled at [facility]. We are pleased to have the opportunity to serve you. For Further Information please call [phone].    On the day of surgery please report to the Information Desk on the 1st floor.    CONTACT YOUR PHYSICIAN'S OFFICE THE DAY PRIOR TO YOUR SURGERY TO OBTAIN YOUR ARRIVAL TIME.     The evening before surgery do not eat anything after 9 p.m. ( this includes hard candy, chewing gum and mints).  You may only have GATORADE, POWERADE AND WATER  from 9 p.m. until you leave your home.   DO NOT DRINK ANY LIQUIDS ON THE WAY TO THE HOSPITAL.      Why does your anesthesiologist allow you to drink Gatorade/Powerade before surgery?  Gatorade/Powerade helps to increase your comfort before surgery and to decrease your nausea after surgery. The carbohydrates in Gatorade/Powerade help reduce your body's stress response to surgery.  If you are a diabetic-drink only water prior to surgery.    Outpatient Surgery- May allow 2 adult (18 and older) Support Persons (1 being the designated ) for all surgical/procedural patients. A breastfeeding mother will be allowed her infant and 2 adult Support Persons. No one under the age of 18 will be allowed in the building. No swapping out of visitors in the [facility].      SPECIAL MEDICATION INSTRUCTIONS: TAKE medications checked off by the Anesthesiologist on your Medication List.    Surgery Patients:    If you take ASPIRIN - Your PHYSICIAN/SURGEON will need to inform you IF/OR when you need to stop taking aspirin prior to your surgery.     The week prior to surgery do not ot take any medications containing IBUPROFEN or NSAIDS ( Advil, Motrin, Goodys, BC, Aleve, Naproxen etc) If you are not sure if you should take a medicine please call your surgeon's office.  Ok to  take Tylenol    Do Not Wear any make-up (especially eye make-up) to surgery. Please remove any false eyelashes or eyelash extensions. If you arrive the day of surgery with makeup/eyelashes on you will be required to remove prior to surgery. (There is a risk of corneal abrasions if eye makeup/eyelash extensions are not removed)      Leave all valuables at home.   Do Not wear any jewelry or watches, including any metal in body piercings. Jewelry must be removed prior to coming to the hospital.  There is a possibility that rings that are unable to be removed may be cut off if they are on the surgical extremity.    Please remove all hair extensions, wigs, clips and any other metal accessories/ ornaments from your hair.  These items may pose a flammable/fire risk in Surgery and must be removed.    Do not shave your surgical area at least 5 days prior to your surgery. The surgical prep will be performed at the hospital according to Infection Control regulations.    Contact Lens must be removed before surgery. Either do not wear the contact lens or bring a case and solution for storage.  Please bring a container for eyeglasses or dentures as required.  Bring any paperwork your physician has provided, such as consent forms,  history and physicals, doctor's orders, etc.   Bring comfortable clothes that are loose fitting to wear upon discharge. Take into consideration the type of surgery being performed.  Maintain your diet as advised per your physician the day prior to surgery.      Adequate rest the night before surgery is advised.   Park in the Parking lot behind the hospital or in the [facility] across the street from the parking lot. Parking is complimentary.  If you will be discharged the same day as your procedure, please arrange for a responsible adult to drive you home or to accompany you if traveling by taxi.   YOU WILL NOT BE PERMITTED TO DRIVE OR TO LEAVE THE HOSPITAL ALONE AFTER SURGERY.   If you are  being discharged the same day, it is strongly recommended that you arrange for someone to remain with you for the first 24 hrs following your surgery.    The Surgeon will speak to your family/visitor after your surgery regarding the outcome of your surgery and post op care.  The Surgeon may speak to you after your surgery, but there is a possibility you may not remember the details.  Please check with your family members regarding the conversation with the Surgeon.    We strongly recommend whoever is bringing you home be present for discharge instructions.  This will ensure a thorough understanding for your post op home care.    If the patient has fever, cough, or signs/symptoms of Flu or Covid please do not come in for your surgery. Contact your surgeon and your primary care physician for further instructions.           Thank you for your cooperation.  The Staff of [facility].            Bathing Instructions with Hibiclens    Shower the evening before and morning of your procedure with Chlorhexidine (Hibiclens)  do not use Chlorhexidine on your face or genitals. Do not get in your eyes.  Wash your face with water and your regular face wash/soap  Use your regular shampoo  Apply Chlorhexidine (Hibiclens) directly on your skin or on a wet washcloth and wash gently. When showering: Move away from the shower stream when applying Chlorhexidine (Hibiclens) to avoid rinsing off too soon.  Rinse thoroughly with warm water  Do not dilute Chlorhexidine (Hibiclens)   Dry off as usual, do not use any deodorant, powder, body lotions, perfume, after shave or cologne.

## 2024-09-21 LAB
OHS QRS DURATION: 78 MS
OHS QTC CALCULATION: 441 MS

## 2024-09-24 ENCOUNTER — OFFICE VISIT (OUTPATIENT)
Dept: PSYCHIATRY | Facility: CLINIC | Age: 74
End: 2024-09-24
Payer: MEDICARE

## 2024-09-24 ENCOUNTER — PATIENT MESSAGE (OUTPATIENT)
Dept: PSYCHIATRY | Facility: CLINIC | Age: 74
End: 2024-09-24

## 2024-09-24 DIAGNOSIS — C50.411 MALIGNANT NEOPLASM OF UPPER-OUTER QUADRANT OF RIGHT BREAST IN FEMALE, ESTROGEN RECEPTOR POSITIVE: ICD-10-CM

## 2024-09-24 DIAGNOSIS — R45.89 ANXIETY ABOUT HEALTH: Primary | ICD-10-CM

## 2024-09-24 DIAGNOSIS — Z17.0 MALIGNANT NEOPLASM OF UPPER-OUTER QUADRANT OF RIGHT BREAST IN FEMALE, ESTROGEN RECEPTOR POSITIVE: ICD-10-CM

## 2024-09-24 DIAGNOSIS — C50.919 INVASIVE LOBULAR CARCINOMA OF BREAST IN FEMALE: ICD-10-CM

## 2024-09-24 NOTE — PROGRESS NOTES
INFORMED CONSENT/ LIMITS of CONFIDENTIALITY: Prior to beginning the interview, the patient's identification was confirmed using two identifiers.  Beth Cantu was informed of the possible risks and benefits of psychological interventions (e.g., counseling, psychotherapy, testing) and provided information regarding the handling of protected health records and   the limits of confidentiality, including the importance of reporting any suicidal or homicidal ideation to ensure safety of all parties and the duty to protect children, seniors, or persons with disabilities. This provider explained the purpose of today's appointment and the patient was provided with time to ask questions regarding this information.  Acceptance and understanding of these conditions was expressed, and Beth Cantu freely consented to this evaluation.     TELEMEDICINE PSYCHO-ONCOLOGY INTAKE    Consultation started: 9/24/2024 at 10:00 AM   The chief complaint leading to consultation is: adaptation to disease and treatment  Virtual visit with synchronous audio and video  .   The patient location is: Patient home in [place], [place]  (Provider licensed in [place], [place], [place])  Also present with the patient at the time of the consultation: spouse    Each patient provided medical services by telemedicine is:  (1) informed of the relationship between the physician and patient and the respective role of any other health care provider with respect to management of the patient; and (2) notified that he or she may decline to receive medical services by telemedicine and may withdraw from such care at any time    Crisis Disclaimer: Patient was informed that due to the virtual nature of the visit, that if a crisis develops, protocols will be implemented to ensure patient safety, including but not limited to: 1) Initiating a welfare check with local Law Enforcement, 2) Calling 1/National Crisis Hotline, and/or 3) Initiating PEC/CEC procedures.      Diagnostic Interview - CPT 87513    Site of Clinician: [facility]     Evaluation Length (direct face-to-face time):  1 hour     Referral Source: Sarah Mims MD   Oncologist:   PCP: Bruce Artis MD    Clinical status of patient: Outpatient    Beth Cantu, a 74 y.o. female, seen for initial evaluation visit.  Met with patient and spouse.    Chief complaint/reason for encounter: adjustment to illness, anxiety and sleep    Medical/Surgical History:    Patient Active Problem List   Diagnosis    Acquired absence of both cervix and uterus (1986)    Migraine    Osteoarthritis    MVP (mitral valve prolapse)    Hypothyroidism associated with surgical procedure    Severe obesity (BMI 35.0-39.9) with comorbidity    Invasive lobular carcinoma of breast in female       Health Behaviors:       ETOH Use: Yes (social)       Tobacco Use: No   THC use: No   Non-prescribed/Illicit Drug Use:  No     Prescription Misuse:No   Caffeine: minimal   Exercise: regular PT since TKR x2 several years ago.      Past Psychiatric History:   Inpatient treatment: No     Outpatient treatment: No     Prior substance abuse treatment: No     Suicide Attempts: No     Psychotropic Medications:  Current: Xanax (PRN)       Past: tranxene    Current medications as per below, allergies reviewed in chart.    Current Outpatient Medications   Medication    ALPRAZolam (XANAX) 0.25 MG tablet    b complex vitamins tablet    inositol/choline/biofla/vitB,C (LIPO-FLAVONOID ORAL)    levothyroxine (SYNTHROID) 150 MCG tablet    levothyroxine (SYNTHROID, LEVOTHROID) 175 MCG tablet    loratadine (CLARITIN) 10 mg tablet    multivitamin capsule    ondansetron (ZOFRAN-ODT) 8 MG TbDL    propranoloL (INDERAL [place]) 120 MG 24 hr capsule    sumatriptan (IMITREX) 25 MG Tab    vitamin D (VITAMIN D3) 1000 units Tab     No current facility-administered medications for this visit.       Social situation  Living/Social History  Current living situation: [place]  [place]  [place]  Marital status:   Partner/Spouse Name: Klaus  Family of Origin: Parents:   Siblings:none  Children/Dependents: 3 adult sons (Luis, Philip, Beny; 2 local, 1 [place]); 6 grandchildren  Social support: excellent    Primary supports: spouse and children    Spiritual/Yarsani:   Hobbies: sewing, travel, time with friends     Stressors: Current: Complicated medical illness   Additional stressors: none  Strengths:Housing stability, Able to vocalize needs, Values and traditions, Motivation, readiness for change, Setting and pursuing goals, hopes, dreams, aspirations, Vocational interests, hobbies and/or talents, Interpersonal relationships and supports available - family, relatives, friends, and Financial stability    Occupational History  Type of work:  for [employer]  Work status:  retired (normal)     Current Evaluation:     Mental Status Exam: Beth Cantu arrived promptly for the assessment session by video. Her  was also present during the interview, with the consent of Beth Cantu.  The patient was fully cooperative throughout the interview and was an adequate historian   Appearance: age appropriate, casually  dressed, well groomed  Behavior/Cooperation: friendly and cooperative  Speech: normal in rate, volume, and tone and appropriate quality, quantity and organization of sentences  Mood: mildly anxious  Affect: mood congruent  Thought Process: goal-directed, logical  Thought Content: normal, no suicidality, no homicidality, delusions, or paranoia;did not appear to be responding to internal stimuli during the interview.   Orientation: grossly intact  Memory: Grossly intact  Attention Span/Concentration: Attends to interview without distraction; reports no difficulty  Fund of Knowledge: average  Estimate of Intelligence: average from verbal skills and history  Cognition: grossly intact  Insight: patient has awareness of illness;  good insight into own behavior and behavior of others  Judgment: the patient's behavior is adequate to circumstances        9/24/2024     9:56 AM   DISTRESS SCREENING   Distress Score 6   Practical Concerns Taking care of myself;None of these   Social Concerns None of these   Emotional Concerns Worry or anxiety   Spiritual or Sabianism Concerns None of these   Physical Concerns Sleep        PHQ ANSWERS    Q1. Little interest or pleasure in doing things: Not at all (09/22/24 1359)  Q2. Feeling down, depressed, or hopeless: Several days (09/22/24 1359)  Q3. Trouble falling or staying asleep, or sleeping too much: Several days (09/22/24 1359)  Q4. Feeling tired or having little energy: Several days (09/22/24 1359)  Q5. Poor appetite or overeating: Not at all (09/22/24 1359)  Q6. Feeling bad about yourself - or that you are a failure or have let yourself or your family down: Not at all (09/22/24 1359)  Q7. Trouble concentrating on things, such as reading the newspaper or watching television: Not at all (09/22/24 1359)  Q8. Moving or speaking so slowly that other people could have noticed. Or the opposite - being so fidgety or restless that you have been moving around a lot more than usual: Not at all (09/22/24 1359)  Q9.      PHQ8 Score : 3 (09/22/24 1359)  PHQ-9 Total Score: 3 (09/22/24 1359)     ANITA-7 Answers        9/22/2024     1:57 PM   GAD7   1. Feeling nervous, anxious, or on edge? 1   2. Not being able to stop or control worrying? 1   3. Worrying too much about different things? 0   4. Trouble relaxing? 1   5. Being so restless that it is hard to sit still? 0   6. Becoming easily annoyed or irritable? 0   7. Feeling afraid as if something awful might happen? 0   8. If you checked off any problems, how difficult have these problems made it for you to do your work, take care of things at home, or get along with other people? 0   ANITA-7 Score 3     ANITA-7 Score: 3  Interpretation: Normal       History of present  illness:  Beth Cantu is a 74 y.o. female who presents with newly diagnosed infiltrating lobular carcinoma of the right breast, Grade 1, ER +, [place] +, HER2 -     Beth Cantu has adjusted to illness fairly well, after initial anxiety, anger at diagnosis. She reports feeling misled by the Tyrer-Jessup score printed at the bottom of her 2/6/24 mammogram/ultrasound. She feels misled by the percentages noted. She was shocked by her diagnosis and worried that it would derail her plans to go to [place] in November. She eugene primarily through active coping strategies. She has engaged in appropriate information gathering.  The patient has excellent family support. She has decided not to tell any of her friends about her diagnosis yet. Her support system is coping adequately with the diagnosis/treatment/prognosis. Illness-related psychosocial stressors include  none .  The patient has an excellent partnership with her [facility] oncology treatment team. The patient reports the following barriers to cancer care:none.     Areas assessed:   Mood: Depression: mild, fleeting depressed mood, insomnia, and fatigue;  no prior; no SI/HI; PHQ-9=3-does not meet screener  Nida: Denies  Psychosis: Denies   Anxiety: Feeling nervous, anxious, or on edge, slightly increased worry (about practical matters, health), and Difficulty relaxing; no prior;  ANITA-7=3- does not meet screener  Generalized anxiety: Denies    Panic Disorder: Denies  Social/specific phobia: Denies   OCD: Denies  Health behaviors: noncontributory  Sleep: Historical problems with sleep onset (prescribed tranxene for several years in early 2000's), occasional problems with sleep onset since diagnosis (prescribed Xanax PRN by PCP, with benefit); no sleep maintenance difficulty, no EDS  and no reported apneic events, (+) psychophysiological factors, no use of OTC/melatonin;     Assessment - Diagnosis - Goals:       ICD-10-CM ICD-9-CM   1. Malignant neoplasm of upper-outer  quadrant of right breast in female, estrogen receptor positive  C50.411 174.4    Z17.0 V86.0       Plan: self-paced relaxation training, meditation class in future    Summary and Recommendations  Beth Cantu is a 74 y.o. female referred by Dr. Mims for psychological evaluation and treatment.  Ms. Cantu appears to be coping adequately with her diagnosis and proposed treatment course, after an initial increase in anxiety. She appears to benefit from detailed, explicit instructions to reduce anxiety. She does have occasional problems with sleep. Discussed her use of recently prescribed Xanax (PRN) and encouraged relaxation training and CBTi if problem persists.   Patient was given information about the availability of a meditation class. Patient was provided information about the [facility]. The patient is not currently interested in psychological intervention. The patient is aware of resources available should  her needs change in the future.    GOALS:   Relaxation exercises (instructions and options sent to patient)  Meditation Class  Support groups/programs (in person and online) provided to her    Damian Norton, PhD  Clinical Psychologist  [place] License #[id number]  [place] License #[id number]  [place] License #[id number]

## 2024-09-24 NOTE — LETTER
September 24, 2024        Sarah Mims MD  [address]  [place] [address]             [facility]  [address] [place] [address]  Phone: [phone]  Fax: [phone]   Patient: Beth Cantu   MR Number: [id number]   YOB: 1950   Date of Visit: 9/24/2024       Dear Dr. Mims:    Thank you for referring Beth Cantu to me for evaluation. Below are the relevant portions of my assessment and plan of care.       Beth Cantu is a 74 y.o. female referred by Dr. Mims for psychological evaluation and treatment.  Ms. Cantu appears to be coping adequately with her diagnosis and proposed treatment course, after an initial increase in anxiety. She appears to benefit from detailed, explicit instructions to reduce anxiety. She does have occasional problems with sleep onset. Discussed her use of recently prescribed Xanax (PRN) and encouraged relaxation training and CBTi if problem persists.   Patient was given information about the availability of a meditation class. Patient was provided information about the [facility]. The patient is not currently interested in psychological intervention. The patient is aware of resources available should  her needs change in the future.     If you have questions, please do not hesitate to call me. I look forward to following Beth Carias along with you.    Sincerely,      Damian Sal, PhD           CC  No Recipients

## 2024-09-25 ENCOUNTER — PATIENT MESSAGE (OUTPATIENT)
Dept: SURGERY | Facility: CLINIC | Age: 74
End: 2024-09-25
Payer: MEDICARE

## 2024-09-25 ENCOUNTER — TELEPHONE (OUTPATIENT)
Dept: HEMATOLOGY/ONCOLOGY | Facility: CLINIC | Age: 74
End: 2024-09-25

## 2024-09-25 ENCOUNTER — DOCUMENTATION ONLY (OUTPATIENT)
Dept: SURGERY | Facility: CLINIC | Age: 74
End: 2024-09-25
Payer: MEDICARE

## 2024-09-25 DIAGNOSIS — F41.9 ANXIETY DUE TO INVASIVE PROCEDURE: Primary | ICD-10-CM

## 2024-09-25 RX ORDER — DIAZEPAM 2 MG/1
2 TABLET ORAL
Qty: 2 TABLET | Refills: 0 | Status: SHIPPED | OUTPATIENT
Start: 2024-09-25

## 2024-09-25 NOTE — NURSING
Patient called, answered questions. Let patient know valium for MRI guided biopsy was called in to [facility] pharmacy. Checking with Jeffy Troncoso LPN about radar reflector. Rescheduled post op appt and added a multi D appt for medical and radiation oncology. Reviewed all appts with patient. Patient verbalized understanding  Oncology Navigation   Intake  Date of Diagnosis: 09/04/24  Cancer Type: Breast  Type of Referral: Internal  Date of Referral: 09/09/24  Initial Nurse Navigator Contact: 09/09/24  Referral to Initial Contact Timeline (days): 0  First Appointment Available: 09/10/24  Appointment Date: 09/10/24  First Available Date vs. Scheduled Date (days): 0     Treatment  Current Status: Active    Surgery: Planned  Surgical Oncologist: Dr. Mims  Type of Surgery: Right lumpectomy with SLNB  Consult Date: 09/17/24  Surgery Schedule Date: 10/09/24    Medical Oncologist: Dr. Eduardo  Consult Date: 10/31/24    Radiation Oncologist: Dr. Kate    Procedures: Genetic test  Biopsy Schedule Date: 09/04/24  Genetic Testing Date Sent: 09/17/24    Physical Therapy Referral Date: 09/20/24    ER: Positive  OK: Positive  Her2: Negative    Radiation Oncologist: Dr. Kate    Support Systems: Spouse/significant other     Acuity      Follow Up  No follow-ups on file.

## 2024-09-25 NOTE — TELEPHONE ENCOUNTER
Spoke w/ pt in regards to seeing if pt would like to r/s integrative appt. Pt asked what the appt was entail. MA advised pt. That this is a consultation visit whereby the provider will review pt's overall health and all services offered by Integrative Oncology. Pt decline scheduling at the moment due to numerous of other appts that pt would like to attend 1st. Pt requested MA to send portal message with program info and pt will reach back out when ready. MA acknowledged.      [place], [place] ext [phone]

## 2024-09-30 ENCOUNTER — TELEPHONE (OUTPATIENT)
Dept: SURGERY | Facility: CLINIC | Age: 74
End: 2024-09-30
Payer: MEDICARE

## 2024-09-30 DIAGNOSIS — Z14.8 GENETIC CARRIER: Primary | ICD-10-CM

## 2024-09-30 NOTE — TELEPHONE ENCOUNTER
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 Patient would like to meet with genetic counseling later - preferable in December. Will place referral. All questions asked and answered.

## 2024-10-01 ENCOUNTER — TELEPHONE (OUTPATIENT)
Dept: HEMATOLOGY/ONCOLOGY | Facility: CLINIC | Age: 74
End: 2024-10-01
Payer: MEDICARE

## 2024-10-01 ENCOUNTER — PATIENT MESSAGE (OUTPATIENT)
Dept: HEMATOLOGY/ONCOLOGY | Facility: CLINIC | Age: 74
End: 2024-10-01
Payer: MEDICARE

## 2024-10-02 ENCOUNTER — HOSPITAL ENCOUNTER (OUTPATIENT)
Dept: RADIOLOGY | Facility: HOSPITAL | Age: 74
Discharge: HOME | End: 2024-10-02
Attending: SURGERY
Payer: MEDICARE

## 2024-10-02 DIAGNOSIS — C50.411 MALIGNANT NEOPLASM OF UPPER-OUTER QUADRANT OF RIGHT BREAST IN FEMALE, ESTROGEN RECEPTOR POSITIVE: ICD-10-CM

## 2024-10-02 DIAGNOSIS — R92.8 ABNORMAL FINDING ON BREAST IMAGING: ICD-10-CM

## 2024-10-02 DIAGNOSIS — C50.919 BREAST CANCER: ICD-10-CM

## 2024-10-02 DIAGNOSIS — Z17.0 MALIGNANT NEOPLASM OF UPPER-OUTER QUADRANT OF RIGHT BREAST IN FEMALE, ESTROGEN RECEPTOR POSITIVE: ICD-10-CM

## 2024-10-02 PROCEDURE — 76642 ULTRASOUND BREAST LIMITED: CPT | Mod: TC,LT

## 2024-10-02 PROCEDURE — 25000003 PHARM REV CODE 250: Performed by: SURGERY

## 2024-10-02 PROCEDURE — 19285 PERQ DEV BREAST 1ST US IMAG: CPT | Mod: RT

## 2024-10-02 PROCEDURE — 63600175 PHARM REV CODE 636 W HCPCS: Performed by: SURGERY

## 2024-10-02 PROCEDURE — 77065 DX MAMMO INCL CAD UNI: CPT | Mod: TC,RT

## 2024-10-02 PROCEDURE — A4648 IMPLANTABLE TISSUE MARKER: HCPCS

## 2024-10-02 RX ORDER — SODIUM BICARBONATE 42 MG/ML
1 INJECTION, SOLUTION INTRAVENOUS ONCE
Status: COMPLETED | OUTPATIENT
Start: 2024-10-02 | End: 2024-10-02

## 2024-10-02 RX ORDER — LIDOCAINE HYDROCHLORIDE 20 MG/ML
10 INJECTION, SOLUTION INFILTRATION; PERINEURAL ONCE
Status: COMPLETED | OUTPATIENT
Start: 2024-10-02 | End: 2024-10-02

## 2024-10-02 RX ADMIN — LIDOCAINE HYDROCHLORIDE 10 ML: 20 INJECTION, SOLUTION INFILTRATION; PERINEURAL at 10:10

## 2024-10-02 RX ADMIN — SODIUM BICARBONATE 1 ML: 42 INJECTION, SOLUTION INTRAVENOUS at 10:10

## 2024-10-03 ENCOUNTER — HOSPITAL ENCOUNTER (OUTPATIENT)
Dept: RADIOLOGY | Facility: HOSPITAL | Age: 74
Discharge: HOME | End: 2024-10-03
Attending: SURGERY
Payer: MEDICARE

## 2024-10-03 ENCOUNTER — TELEPHONE (OUTPATIENT)
Dept: HEMATOLOGY/ONCOLOGY | Facility: CLINIC | Age: 74
End: 2024-10-03
Payer: MEDICARE

## 2024-10-03 DIAGNOSIS — C50.912 LOBULAR BREAST CANCER, LEFT: ICD-10-CM

## 2024-10-03 DIAGNOSIS — R92.8 ABNORMAL FINDING ON BREAST IMAGING: Primary | ICD-10-CM

## 2024-10-03 DIAGNOSIS — C50.919 BREAST CANCER: ICD-10-CM

## 2024-10-03 DIAGNOSIS — R92.8 ABNORMAL FINDING ON BREAST IMAGING: ICD-10-CM

## 2024-10-03 DIAGNOSIS — C50.919 INVASIVE LOBULAR CARCINOMA OF BREAST IN FEMALE: ICD-10-CM

## 2024-10-03 PROCEDURE — 25500020 PHARM REV CODE 255: Performed by: SURGERY

## 2024-10-03 PROCEDURE — A4648 IMPLANTABLE TISSUE MARKER: HCPCS

## 2024-10-03 PROCEDURE — 77065 DX MAMMO INCL CAD UNI: CPT | Mod: TC,RT

## 2024-10-03 PROCEDURE — 19085 BX BREAST 1ST LESION MR IMAG: CPT | Mod: LT,,, | Performed by: RADIOLOGY

## 2024-10-03 PROCEDURE — A9577 INJ MULTIHANCE: HCPCS | Performed by: SURGERY

## 2024-10-03 PROCEDURE — 63600175 PHARM REV CODE 636 W HCPCS: Performed by: SURGERY

## 2024-10-03 PROCEDURE — 77065 DX MAMMO INCL CAD UNI: CPT | Mod: 26,RT,, | Performed by: RADIOLOGY

## 2024-10-03 PROCEDURE — 19085 BX BREAST 1ST LESION MR IMAG: CPT | Mod: TC

## 2024-10-03 RX ORDER — LIDOCAINE HYDROCHLORIDE 10 MG/ML
3 INJECTION, SOLUTION EPIDURAL; INFILTRATION; INTRACAUDAL; PERINEURAL ONCE
Status: COMPLETED | OUTPATIENT
Start: 2024-10-03 | End: 2024-10-03

## 2024-10-03 RX ORDER — LIDOCAINE HYDROCHLORIDE AND EPINEPHRINE 20; 10 MG/ML; UG/ML
20 INJECTION, SOLUTION INFILTRATION; PERINEURAL ONCE
Status: COMPLETED | OUTPATIENT
Start: 2024-10-03 | End: 2024-10-03

## 2024-10-03 RX ADMIN — GADOBENATE DIMEGLUMINE 20 ML: 529 INJECTION, SOLUTION INTRAVENOUS at 09:10

## 2024-10-03 RX ADMIN — LIDOCAINE HYDROCHLORIDE 3 ML: 10 INJECTION, SOLUTION EPIDURAL; INFILTRATION; INTRACAUDAL; PERINEURAL at 09:10

## 2024-10-03 RX ADMIN — LIDOCAINE HYDROCHLORIDE,EPINEPHRINE BITARTRATE 20 ML: 20; .01 INJECTION, SOLUTION INFILTRATION; PERINEURAL at 09:10

## 2024-10-04 ENCOUNTER — OFFICE VISIT (OUTPATIENT)
Dept: HEMATOLOGY/ONCOLOGY | Facility: CLINIC | Age: 74
End: 2024-10-04
Payer: MEDICARE

## 2024-10-04 DIAGNOSIS — Z14.8 GENETIC CARRIER: ICD-10-CM

## 2024-10-04 NOTE — PROGRESS NOTES
"Cancer Genetics  Hereditary and High-Risk Clinic  Department of Hematology and Oncology  [facility]    Date of Service:  10/4/24  Visit Provider:  Jose C Enrique [facility]    Patient ID  Name: Beth Cantu    : 1950    MRN: [id number]      Referring Provider  Susan Rodriguez PA-C  [address] [place] [address]    Televisit Information  The patient location is: ***.    The chief complaint leading to consultation is:  As below.    Visit type: {TELE AUDIOVISUAL:32737}.    ***The reason for the audio-only service rather than synchronous-audio-and-video virtual visit was related to technical difficulties or patient preference/necessity.***    Face-to-face time with patient:  Approximately *** minutes.    Approximately *** minutes in total were spent on this encounter, which includes face-to-face time and non-face-to-face time preparing to see the patient (e.g., review of records and tests), obtaining and/or reviewing separately obtained history, documenting clinical information in the electronic or other health record, independently interpreting results (not separately reported) and communicating results to the patient/family/caregiver, or care coordination (not separately reported).  Each patient to whom he or she provides medical services by telemedicine is:  (1) informed of the relationship between the physician and patient and the respective role of any other health care provider with respect to management of the patient; and (2) notified that he or she may decline to receive medical services by telemedicine and may withdraw from such care at any time.    IMPRESSION          FOCUSED PERSONAL HISTORY     Chief Complaint: No chief complaint on file.  ***  History of Present Illness (HPI):  Beth Cantu ("Beth"), 74 y.o., assigned female sex at birth, is {NP/EP:64236}.  She was referred by Susan Rodriguez with *** for hereditary cancer risk " "assessment given her ***.    Cancer history:   Breast cancer, 75yo  Masses/tumors/lesions:    Past Medical History:   Diagnosis Date    Absence of both cervix and uterus, acquired     TVH    Breast cyst     Cancer     right breast    H/O bone density study 07/21/2014    Normal    H/O colonoscopy 2005    Cologard done 10/2016, Negative    H/O mammogram 2015    Normal      Herpes simplex virus (HSV) infection     Hypothyroidism associated with surgical procedure     Migraines     MVP (mitral valve prolapse)     Osteoarthritis     Overweight     Thyroid disease     Vaginal Pap smear 2006    Normal     Patient Active Problem List   Diagnosis    Acquired absence of both cervix and uterus (1986)    Migraine    Osteoarthritis    MVP (mitral valve prolapse)    Hypothyroidism associated with surgical procedure    Severe obesity (BMI 35.0-39.9) with comorbidity    Invasive lobular carcinoma of breast in female       Breast Cancer Risk Assessment Questionnaire  Age:  74 y.o.   Race and ethnicity:  White, Not  or /a  Weight:  *** lb  Height:  {NUMBER; 1-9:[id number]}'{NUMBER 1-16:72679}"  Mammographic breast density:  scattered fibroglandular densities bilat 12/28/23 - There is no mammographic evidence of malignancy in the left breast. , R breast abnormal, follow up of R breast on 10/2/24  Age at menarche:  10y  Age at first live childbirth:  26y  Menopausal status:  postmenopausal  Age at menopause, if applicable:  36, partial hysterectomy   Hormone replacement therapy use history:  ***  Breast biopsy history and findings:  10/3/24  Thoracic radiation therapy history:  ***  Breast cancer susceptibility gene testing history:  See above  Ashkenazi Mu-ism ancestry:  No  Family history of breast cancer, ovarian cancer, and genetic testing:  See below  No pancreatitis   Cologuard negative 2021  Last colonoscopy 2005genetic    Focused Social History  Tobacco Use: Low Risk  (9/24/2024)    Patient History     Smoking " "Tobacco Use: Never     Smokeless Tobacco Use: Never     Passive Exposure: Not on file     *** smoker  Total number of years smoked:  ***  Average number of packs smoked:  ***/***  = *** pack-years      FAMILY HISTORY         Beth reported his family history of cancer as follows:      A family history of birth defects, intellectual disability, SIDS, sudden early death, multiple miscarriages and consanguinity were denied. Please refer to above pedigree for further details. A larger copy is available for review into the Media tab.    DISCUSSION     GENETIC TEST RESULTS    Beth had a sample submitted to *** on *** for *** testing. This panel includes sequencing and/or deletion/duplication testing of the following genes known to be associated with hereditary *** cancer:       The results of this testing revealed a mutation in ***. This particular mutation is described as ***, which results in ***. This is considered a positive result and is diagnostic of a hereditary cancer syndrome known as ***.     Additionally, a variant of unknown significance (VUS) was identified. A VUS is a change in a gene that may or may not be related to cancer risk. However, there is not enough information available to determine if it is disease-causing/pathogenic ("positive") or benign ("negative").     The VUS was identified in ***, heterozygous pathogenic variants in which cause ***. However, it is unknown if the variant found in ***, labeled ***, is related to cancer risk. It ***. Therefore, it is not recommended to alter management for Beth or her family based on this variant at this time. When this variant is reclassified, the laboratory will send the ordering provider's office an updated report.     CANCER RISKS        FAMILY MEMBERS AND INHERITANCE    We discussed how *** mutations are passed through the family. Beth's children have a 50% chance to have inherited the same *** mutation identified in {HER/HIM}.  We reviewed that " *** mutations are passed down for generations, therefore Beth inherited this from one of her parents.      Based on the family history of *** on Beth's *** side of the family, it would appear that the ***  mutation likely originated there. Therefore, it is recommended that Tarans *** relatives consider undergoing predictive testing to determine if they inherited the familial ***  mutation.   OR  It is difficult to determine where the *** mutation originated from. Therefore, we would recommend offering predictive testing to members on both sides of the family.     De Pablito  We discussed how *** mutations are passed through the family. Tarans children have a 50% chance to have inherited the same *** mutation identified in {HER/HIM}. We reviewed that typically ***  mutations are inherited from one of our parents. In some cases, however, they start new in an individual. This is called a de pablito mutation. The  gene has a % de pablito rate. Genetic testing for Beth's immediate family (siblings and parents) would be recommended. If one of Beth's parents tests positive for the same *** mutation, then that side of the family could consider predictive testing.     Pediatric Testing  We discussed that heterozygous *** mutations are not associated with an increased risk of pediatric cancers and therefore predictive testing is not recommended until age 18.   OR  We discussed that heterozygous *** mutations can be associated with pediatric malignancy and genetic testing is recommended for at risk children. If anyone is interested in having their children tested for the familial *** mutation, contact information will be provided to the [facility].      Beth received comprehensive counseling regarding her positive genetic test results. Benefits and limitations of genetic testing were discussed.  Beth was provided with a copy of her genetic test results, along with a family letter and genetic  counseling letter. Beth will follow-up with me every 2 years for management updates. In the meantime,  is encouraged to contact us with any changes to her personal or family history, or if she has any questions or concerns.     ASSESSMENT / PLAN      Post-test genetic counseling was provided for ***      No diagnosis found.  There are no diagnoses linked to this encounter.       ***Follow-up:  No follow-ups on file.***      Approximately *** minutes were spent face-to-face with the patient.  Approximately *** minutes in total were spent on this encounter, which includes face-to-face time and non-face-to-face time preparing to see the patient (e.g., review of tests), obtaining and/or reviewing separately obtained history, documenting clinical information in the electronic or other health record, independently interpreting results (not separately reported) and communicating results to the patient/family/caregiver, or care coordination (not separately reported).     This assessment is based on the history and reports provided, as well as the current scientific knowledge regarding cancer genetics.         Jose C Enrique, MGC, [facility]  Certified Genetic Counselor, Hereditary and High-Risk Clinic  Department of Hematology and Oncology  [facility]        CC:  ***Susan Rodriguez

## 2024-10-04 NOTE — PROGRESS NOTES
Cancer Genetics  Hereditary and High-Risk Clinic  Department of Hematology and Oncology  [facility]    Date of Service:  10/4/24  Visit Provider:  BRIAN Hernandez, [facility] [place]    [place] ID  Name: Beth Cantu    : 1950    MRN: [id number]      Referring Provider  Susan Rodriguez PA-C  [address] [place] [address]    Televisit Information  The patient location is: [place].    The chief complaint leading to consultation is:  As below.    Visit type: audiovisual.    Face-to-face time with patient:  Approximately 60 minutes.    Approximately 100 minutes in total were spent on this encounter, which includes face-to-face time and non-face-to-face time preparing to see the patient (e.g., review of records and tests), obtaining and/or reviewing separately obtained history, documenting clinical information in the electronic or other health record, independently interpreting results (not separately reported) and communicating results to the patient/family/caregiver, or care coordination (not separately reported).  Each patient to whom he or she provides medical services by telemedicine is:  (1) informed of the relationship between the physician and patient and the respective role of any other health care provider with respect to management of the patient; and (2) notified that he or she may decline to receive medical services by telemedicine and may withdraw from such care at any time. 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 We discussed the implications of this result for herself and for her family members. We discussed the option for her children to undergo germline MUTYH screening to determine if they are also carriers or at risk for  "MAP.    FOCUSED PERSONAL HISTORY     Chief Complaint: Carrier for autosomal recessive MUTYH-associated polyposis    History of Present Illness (HPI):  Beth Cantu ("Beth"), 74 y.o., assigned female sex at birth, is new to the [facility] and to me.  She was referred by Susan Rodriguez for hereditary cancer risk assessment given her heterozygous MUTYH mutation.    Cancer History  Invasive lobular carcinoma of right breast  Basal cell carcinoma diagnosed in the early 90's  Personal history of masses/tumors/lesions  Thyroidectomy due to benign thyroid nodules    Past Medical History:   Diagnosis Date    Absence of both cervix and uterus, acquired     TVH    Breast cyst     Cancer     right breast    H/O bone density study 07/21/2014    Normal    H/O colonoscopy 2005    Cologard done 10/2016, Negative    H/O mammogram 2015    Normal      Herpes simplex virus (HSV) infection     Hypothyroidism associated with surgical procedure     Migraines     MVP (mitral valve prolapse)     Osteoarthritis     Overweight     Thyroid disease     Vaginal Pap smear 2006    Normal     Patient Active Problem List   Diagnosis    Acquired absence of both cervix and uterus (1986)    Migraine    Osteoarthritis    MVP (mitral valve prolapse)    Hypothyroidism associated with surgical procedure    Severe obesity (BMI 35.0-39.9) with comorbidity    Invasive lobular carcinoma of breast in female       Breast Cancer Risk Assessment Questionnaire  Age:  74 y.o.   Race and ethnicity:  White, Not  or /a  Mammographic breast density:  scattered fibroglandular densities   Age at menarche:  10y  Age at first live childbirth:  26y  Menopausal status:  postmenopausal  Age at menopause, if applicable:  38-39, partial hysterectomy   Hormone replacement therapy use history: 1991 took HRT, also used OCP  Breast biopsy history and findings:  10/3/24  Breast cancer susceptibility gene testing history:  " See above  Ashkenazi Episcopal ancestry:  No  Family history of breast cancer, ovarian cancer, and genetic testing:  See below  Pancreatitis: No  Colonoscopy: last colonoscopy in , Cologuard negative in     Focused Social History  Tobacco Use: Low Risk  (2024)    Patient History     Smoking Tobacco Use: Never     Smokeless Tobacco Use: Never     Passive Exposure: Not on file       FAMILY HISTORY     ONCOLOGY PEDIGREE  Ancestry: paternal - Shelley/Trish, maternal - Shelley/    Ashkenazi Episcopal:  No  Consanguinity:  No  Hereditary cancer genetic testing in blood relatives:  No    Family Cancer Pedigree:  Pedigree Image     Beth reported his family history of cancer as follows:  Maternal aunt ( at 76yo) diagnosed with cancer, unknown type  Maternal uncle ( at 77yo) diagnosed with cancer, unknown type  Maternal first cousin diagnosed with cancer, unknown type  Maternal first cousin diagnosed with cancer, unknown type  Maternal first cousin once removed diagnosed with cancer, unknown type  Maternal first cousin once removed diagnosed with breast cancer at 31yo      DISCUSSION     GENETIC TEST RESULTS    Beth had a sample submitted to [facility] on 24 for [employer] panel testing. This panel includes sequencing and/or deletion/duplication testing of the following genes known to be associated with hereditary cancer:     APC, TY, AXIN2, BAP1, BARD1, BMPR1A, BRCA1, BRCA2, BRIP1, CDH1, CDK4, CDKN2A, CHEK2, CTNNA1, FH, FLCN, HOXB13, MEN1, MET, MLH1, MSH2, MSH3, MSH6, MUTYH, NTHL1, PALB2, PMS2, PTEN, RAD51C,  RAD51D, SDHA, SDHB, SDHC, SDHD, SMAD4, STK11, TP53, TSC1, TSC2, VHL.    The results of this testing revealed a heterozygous mutation in MUTYH. This particular mutation is described as deleterious, which results in carrier status for MUTYH-associated polyposis syndrome (MAP). Beth does NOT have biallelic mutations diagnostic of a hereditary cancer syndrome known as MAP.      MUTYH    Background information  MUTYH is a gene that, when functioning normally, helps protect against cancer. However, mutations (harmful differences) in the gene can prevent it from working properly, leading to a higher risk of colorectal polyps and certain types of cancer. People have two copies of the MUTYH gene, one from each biological parent. Typically, for an individual to have a significantly increased risk of polyps and cancer, both copies of the MUTYH gene must have mutations. This is sometimes called biallelic or homozygous mutations. People who have biallelic mutations have a condition called MUYTH-Associated Polyposis (MAP).     A mutation in only one copy of the MUTYH gene is sometimes called a monoallelic or heterozygous mutation. Some studies have shown that individuals with a monoallelic mutation in MUTYH may have a slightly increased risk for colorectal (or other types of) cancers. However, other studies show no increased risk for individuals with a mutation in one copy of MUTYH.     Cancer risks  Biallelic mutations in MUTYH cause MUTYH-associated polyposis (MAP). Individuals with MAP can develop dozens (<100) of colorectal polyps, including adenomas, hyperplastic, serrated and others, and have a 70-90% lifetime risk of colorectal cancer. They also have increased risks of gastric fundic polyps (~11%) and duodenal polyps (17%-34%), with ~4% lifetime risk of duodenal cancer.  There may be additional cancer risks associated with MAP, but there is limited evidence at this time.     Individuals with a monoallelic mutation in MUTYH do not have MAP, but are carriers of MAP. They may have a 6%-13% lifetime risk of colorectal cancer, which appears to be largely dependent on family history. Individuals with a monoallelic MUTYH mutation and a family history of colorectal cancer seem to have a higher cancer risk.     Management Recommendations  People who have a mutation in one copy of the MUTYH gene  should follow screening guidelines for the general population.     Risk to Relatives  If an individual has a mutation in the MUTYH gene, there is a 50% chance each child will inherit the mutation. It is likely that this mutation was inherited from one parent (although we cannot tell from testing which parent it was inherited from). As such, the siblings and children of someone with a MUTYH mutation are each expected to have a 50% chance of having the same MUTYH mutation. However, if both parents have a mutation in one copy of the MUTYH gene, for each child there is a 25% chance that they will have MAP, a 50% chance that they will have a monoallelic mutation, and a 25% chance that they will have no mutations in the gene.  As such, there is a chance that siblings or children of someone with a MUTYH mutation could have MAP. More distant relatives are also at risk of having the familial MUTYH mutation.    If someone has a mutation in MUTYH, their partner can be tested to determine if there is a chance their child could have MAP. This testing is sometimes called carrier screening.    Because Beth is a known carrier for a MUTYH mutation, we discussed carrier screening for her children to inform them of their carrier status.     Reproductive Information  Pre-Implantation Genetic Testing  Some people who have a mutation in MUTYH want to reduce the chance of a child inheriting MAP. If an individual uses in-vitro fertilization to get pregnant, genetic testing can be conducted on embryos to determine if they have the familial mutation(s). Embryos that do not can then be selected for use, reducing the chance of having a child who is affected. To get more information about this process, individuals with a MUTYH mutation can speak with a reproductive genetic counselor.    REFERENCES   National Comprehensive Cancer Network (NCCN). (2024). Genetic/familial high-risk assessment: Colorectal. NCCN Clinical Practice Guidelines in  Oncology (NCCN Guidelines), Version 1.2024    Bteh received comprehensive counseling regarding her positive genetic test results. Benefits and limitations of genetic testing were discussed.  Beth was provided with a copy of her genetic test results, along with a family letter and genetic counseling letter. Beth will follow-up with me every 2 years for management updates. In the meantime,  is encouraged to contact us with any changes to her personal or family history, or if she has any questions or concerns.     ASSESSMENT / PLAN      Post-test genetic counseling was provided for Beth Cantu.      Follow-up:  A portal message will be sent containing information about MUTYH mutations and carrier screening. Because Beth is a heterozygous MUTYH carrier, she is encouraged to follow screening guidelines for the general population.     This assessment is based on the history and reports provided, as well as the current scientific knowledge regarding cancer genetics.     Jose C Enrique, MGC, [facility] [place]  Certified Genetic Counselor, Hereditary and High-Risk Clinic  Department of Hematology and Oncology  [facility]        CC:  Susan Rodriguez

## 2024-10-07 ENCOUNTER — TELEPHONE (OUTPATIENT)
Dept: SURGERY | Facility: CLINIC | Age: 74
End: 2024-10-07
Payer: MEDICARE

## 2024-10-07 ENCOUNTER — CLINICAL SUPPORT (OUTPATIENT)
Dept: REHABILITATION | Facility: HOSPITAL | Age: 74
End: 2024-10-07
Attending: SURGERY
Payer: MEDICARE

## 2024-10-07 DIAGNOSIS — Z91.89 AT RISK FOR LYMPHEDEMA: Primary | ICD-10-CM

## 2024-10-07 DIAGNOSIS — Z17.0 MALIGNANT NEOPLASM OF UPPER-OUTER QUADRANT OF RIGHT BREAST IN FEMALE, ESTROGEN RECEPTOR POSITIVE: ICD-10-CM

## 2024-10-07 DIAGNOSIS — C50.411 MALIGNANT NEOPLASM OF UPPER-OUTER QUADRANT OF RIGHT BREAST IN FEMALE, ESTROGEN RECEPTOR POSITIVE: ICD-10-CM

## 2024-10-07 PROCEDURE — 97161 PT EVAL LOW COMPLEX 20 MIN: CPT

## 2024-10-07 PROCEDURE — 97535 SELF CARE MNGMENT TRAINING: CPT

## 2024-10-07 NOTE — TELEPHONE ENCOUNTER
Called patient with results of additional breast biopsy showing a small focus of invasive ductal carcinoma in the left breast. Patient would like to proceed with bilateral lumpectomy and bilateral SLNB. Will proceed with surgical date as planned. Patient verbalized understanding. All questions asked and answered.

## 2024-10-07 NOTE — PLAN OF CARE
OUTPATIENT PHYSICAL THERAPY   PRE-OP EVALUATION    Name: Beth Carias [facility]  Clinic Number: [id number]    Therapy Diagnosis:   Encounter Diagnoses   Name Primary?    Malignant neoplasm of upper-outer quadrant of right breast in female, estrogen receptor positive     At risk for lymphedema Yes        Physician: Sarah Mims MD    Physician Orders: PT Eval and Treat   Medical Diagnosis from Referral: C50.411,Z17.0 (ICD-10-CM) - Malignant neoplasm of upper-outer quadrant of right breast in female, estrogen receptor positive   Evaluation Date: 10/7/2024  Authorization Period Expiration: 9/20/2025  Plan of Care Expiration: 10/7/2024  Progress Note Due: N/A  Visit # / Visits authorized: 1/ 1   FOTO: 1/3    Precautions: Standard and cancer     Time In: 10:30 am  Time Out: 11:15 am  Total Appointment Time (timed & untimed codes): 45 minutes    History   History of Present Illness: Beth Carias is a 74 y.o. female that presents to  [facility] at [email or web address] clinic secondary to dx of right breast cancer.    Dx: infiltrating lobular carcinoma of the right breast, Grade 1, ER +, OR +, HER2 -   Surgery date: 10/9/2024      Pt presents today for baseline measurements to aid in the early detection of lymphedema, UE muscle testing, postural and ROM assessment along with education of risk of lymphedema and surgical precautions post surgery. Circumferential measurements will be taken today of BL UEs for early detection of lymphedema post surgery. Pt will also be instructed in exercises to perform pre and post-surgery to insure best outcomes.     Past Medical History:   Past Medical History:   Diagnosis Date    Absence of both cervix and uterus, acquired     TVH    Basal cell carcinoma (BCC)     1990's    Breast cyst     Cancer     right breast    H/O bone density study 07/21/2014    Normal    H/O colonoscopy 2005    Cologard done 10/2016, Negative    H/O mammogram 2015    Normal       Herpes simplex virus (HSV) infection     Hypothyroidism associated with surgical procedure     Migraines     MVP (mitral valve prolapse)     Osteoarthritis     Overweight     Thyroid disease     Vaginal Pap smear 2006    Normal       Past Surgical History:   Beth Cantu  has a past surgical history that includes Thyroidectomy (2013); Cholecystectomy (2003); Knee surgery (Left, 2013); Dilation and curettage of uterus (1979); Hysterectomy (1986); Breast cyst aspiration; Joint replacement; Tonsillectomy; arthroplasty, knee, total, sight assisted (Right, 06/28/2021); Colonoscopy; and Tympanostomy tube placement (Left).    Medications:  Beth Carias has a current medication list which includes the following prescription(s): alprazolam, b complex vitamins, diazepam, inositol/choline/biofla/vitb,c, levothyroxine, levothyroxine, loratadine, multivitamin, ondansetron, propranolol, sumatriptan, and vitamin d.    Allergies:  Review of patient's allergies indicates:   Allergen Reactions    Penicillin Hives and Rash     Other reaction(s): in high school, Skin Rashes/Hives          Hand dominance: right  Prior Therapy: PT for both knees    Social History: lives with spouse, 2 cats  Place of Residence (Steps/Adaptations): Single story home, one small step to enter  [facility] owned: RW,, [employer]  Current functional status:  independent  Exercise routine prior to onset : 2x week PT for maintenance exercises  Work:  retired                         Subjective   Pt states: no pain today  Pain: 0/10 on VAS.     Objective   Mental status: alert & oriented x 3    Posture/Alignment   Postural examination/scapular alignment: rounded shoulders    Nutrition:  Obese    Skin integrity: intact  Edema: none noted    Sensation: Light Touch: Intact           Proprioception: Intact  Appearance: well groomed     ROM:   UPPER EXTREMITY--AROM/PROM  (R) UE: WNLs  (L) UE: WNLs     Shoulder Range of Motion:   ACTIVE ROM RIGHT LEFT   Flexion 170 180  "  Abduction 170 170   Extension 65 65   IR/90deg 90 90   ER/90deg 90 90     Strength: manual muscle test grades below   Upper Extremity Strength   RIGHT UE LEFT UE   Shoulder flexion: 4+/5 4+/5   Shoulder Abduction: 5/5 5/5   Shoulder IR 5/5 5/5   Shoulder ER 5/5 5/5   Elbow flexion: 5/5 5/5   Elbow extension: 5/5 5/5   Wrist flexion: 5/5 5/5   Wrist extension: 5/5 5/5    69.0 42.9       Baseline measurements of bilateral upper extremities for early detection of lymphedema:     LANDMARK RIGHT UPPER EXTREMITY LEFT UPPER EXTREMITY DIFFERENCE   E + 8" 45.5 cm 44.5 cm 1.0 cm   E + 6" 43 cm 42.5 cm 0.5 cm   E + 4" 41 cm 41.5 cm 0.5 cm   E + 2" 40 cm 38.5 cm 1.5 cm   Elbow 34.5 cm 33 cm 1.5 cm   W+ 8" 34 cm 33 cm 1.0 cm   W +  6" 32 cm 31.5 cm 0.5 cm   W + 4" 28 cm 28 cm 0 cm   Wrist  20 cm 19 cm 1.0 cm   DPC 21.5 cm 22 cm 0.5 cm   IP Thumb 7.0 cm 7.5 cm 0.5 cm       Coordination:   - fine motor: within functional limits  - UE coordination: intact     - LE coordination:  Not tested     Functional Mobility (Bed mobility, transfers)  Bed mobility: I =  independent   Roll to left: I  Roll to right: I  Supine to prone: I  Scooting to edge of bed: I  Supine to sit: I  Sit to supine: I  Transfers to bed: I  Transfers to toilet: I  Sit to stand:  I  Stand pivot:  I  Car transfers: I      ADL's:  Feeding: I = independent   Grooming: I  Hygiene: I  UB Dressing: I  LB Dressing: I  Toileting: I  Bathing: I    Gait Assessment:   - Assistive device used: none  - Assistance: independent  - Distance: community distances       Functional Limitations Reporting        Intake Outcome Measure for FOTO Shoulder Survey    Therapist reviewed FOTO scores for Beth Cantu on 10/7/2024.   FOTO documents entered into [employer] - see Media section.    Intake Score: 101%           Pt has no cultural, educational or language barriers to learning provided.    Treatment and Patient Education     Total Time Separate from Evaluation: 15 " minutes    Patient participated in self care/home management for 15 minutes including the following:     - Role of PT in multi - disciplinary team, goals for PT  - Pt was educated in lymphedema etiology and management plans.    - Pt was provided with written risk reductions and precautions for managing lymphedema.   - Reviewed CHRISTY drain care instructions.     ROM/lifting Precautions post surgery discussed -  until your follow up visit with your surgeon:  - do not lift over 5 lbs  - do not pull or push heavy objects  - do not sleep on your stomach or surgery side     Pt was instructed in and performed therapeutic exercise for 5 minutes for postural correction and alignment, stretching and soft tissue mobility.   Exercises included:   - exaggerated deep breathing and relaxation  - scapular retractions  - fist making  - elbow flexion/extension    Pt was able to demonstrate and report understanding and performance    Written Home Exercises Provided: yes.  Exercises were reviewed and Beth was able to demonstrate them prior to the end of the session.  Beth demonstrated good  understanding of the education provided.     See EMR under Patient Instructions for exercises provided 10/7/2024.      Assessment   This is a 74 y.o. female referred to outpatient physical therapy and presents with a medical diagnosis of right breast cancer and was seen today pre-operatively to assess strength and ROM of BL UEs, to take baseline circumferential measurements of BL UEs to aid in the early detection of lymphedema and provide pt education on exercises/precations post breast surgery. Pt does not exhibit any ROM impairments  Pt educated in lymphedema risks/precautions as well as ROM/lifting precautions post surgery - pt demonstrated/verbalized understanding. No goals established this visit as goals for PT will be established post surgery at follow up.      Anticipated barriers to physical therapy: none anticipated     Pt's spiritual,  cultural and educational needs considered and pt agreeable to plan of care and goals as stated below:     Medical Necessity is demonstrated by the following  History  Co-morbidities and personal factors that may impact the plan of care [] LOW: no personal factors / co-morbidities  [x] MODERATE: 1-2 personal factors / co-morbidities  [] HIGH: 3+ personal factors / co-morbidities    Moderate / High Support Documentation:   Co-morbidities affecting plan of care: history of cancer    Personal Factors:   no deficits     Examination  Body Structures and Functions, activity limitations and participation restrictions that may impact the plan of care [x] LOW: addressing 1-2 elements  [] MODERATE: 3+ elements  [] HIGH: 4+ elements (please support below)    Moderate / High Support Documentation: N/A     Clinical Presentation [] LOW: stable  [x] MODERATE: Evolving  [] HIGH: Unstable     Decision Making/ Complexity Score: low           Plan   Schedule patient for follow up with Physical therapy post surgery. Goals for therapy post surgery will be established at that time.     Therapist: Roxanna Bueno, PT  10/7/2024

## 2024-10-07 NOTE — PATIENT INSTRUCTIONS
PRE/POST OP PATIENT EDUCATION    Post Operative Instructions     Range of Motion/lifting Precautions post surgery  The following activities should be avoided until your drain(s) have been removed:  - do not lift over 5 lbs  - do not pull or push heavy objects  - do not sleep on your stomach or surgery side         After surgery, you may begin self-care tasks including grooming, dressing, feeding and simple hygiene as soon as you feel up to it.    Schedule your post-op therapy follow-up after your drains have been removed     When to call your doctor   - if any part of your affected arm or axilla feels hot, is reddened or has increased swelling   - if you develop a temperature over 101 degrees Fahrenheit      Lymphedema - Identification and Prevention     Lymphedema - is the swelling of a body area or extremity caused by the accumulation of lymphatic fluid.  There is a risk for lymphedema with the removal of lymph nodes, trauma or radiation therapy.  Treatment of breast cancer often involves surgery: mastectomy or lumpectomy. Some of the lymph nodes in the underarm (called axillary lymph nodes) may be removed and checked to see if they contain cancer cells.     During breast surgery when axillary lymph nodes are removed (with sentinel node biopsy or axillary dissection) or are treated with radiation therapy, the lymphatic system may become impaired. This may prevent lymphatic fluid from leaving the area therefore, causing lymphedema.     Lymphatic fluid is a normal part of the circulatory system. Its function is to remove waste products and to produce cells vital to fighting infection. Swelling occurs when the vessels become restricted and the lymphatic fluid is unable to freely flow through them.  If lymphedema is left untreated, the affected limb could progressively become more swollen, which could lead to hardening of the skin, bulkiness in the limb, infection and impaired  wound healing.         There are things you can do to decrease the chance of developing lymphedema.                                          [email or web address]                                                                                                                                                  The information presented is intended for general information and educational purposes. It is not intended to replace the  advice of your health care provider. Contact your health care provider if you believe you have a health problem.                                                    POST OP EXERCISES - SAFE TO DO THE FIRST 2 WEEKS AFTER SURGERY UNTIL YOUR FOLLOW UP APPOINTMENT WITH PHYSICAL/OCCUPATIONAL THERAPY    Scapular Retraction (Standing)    With arms at sides, squeeze shoulder blades together. Do not shrug shoulders and do not hold your breath. Hold 5 seconds. Repeat 10 times, 2-3 sets, 2 x day.  Can be completed seated or standing.      Exaggerated Breathing and Relaxation      Practice deep breathing frequently in the first few days following surgery even before you begin exercising. This exercise helps with tissue extensibility in the chest wall.  Inhale slowly and deeply through the nose and exhale through pursed lips. Concentrate on relaxing as you let the air out of your lungs. Repeat three (3) to four (4) times, remembering to breath in deeply and then relaxing. This exercise helps to ease the sensation of pulling and discomfort that may be experienced while exercising.      Ball Squeeze OR Hand pumps       Perform this exercise three (3) times a day for 1-3 minutes each time.    The ball squeeze or hand pumps helps to prevent or reduce temporary swelling that may occur in the affected arm. This exercise may be performed standing, sitting or while lying in bed. During this exercise the affected arm should be slightly bent and held upward. Support your arm with a pillow if you are  uncomfortable holding it up.    a. Hold a rubber ball in your hand on the affected side and lift your arm upward.  b. Alternate squeezing and relaxing the ball.        AROM: Elbow Flexion / Extension        With involved hand(s) palm up, gently bend elbow as far as possible. Then straighten arm as far as possible. Do this seated or standing. You can do one arm at a time or both at the same time.  Repeat 10 times per set. Do 2-3 sets per session. Do 1-3 sessions per day.

## 2024-10-08 ENCOUNTER — TELEPHONE (OUTPATIENT)
Dept: SURGERY | Facility: CLINIC | Age: 74
End: 2024-10-08
Payer: MEDICARE

## 2024-10-08 ENCOUNTER — PATIENT MESSAGE (OUTPATIENT)
Dept: SURGERY | Facility: CLINIC | Age: 74
End: 2024-10-08
Payer: MEDICARE

## 2024-10-08 NOTE — TELEPHONE ENCOUNTER
Spoke with patient regarding surgery arrival time for tomorrow with Dr. Mims. All questions answered at this time.

## 2024-10-09 ENCOUNTER — HOSPITAL ENCOUNTER (OUTPATIENT)
Dept: RADIOLOGY | Facility: OTHER | Age: 74
Discharge: HOME | End: 2024-10-09
Attending: SURGERY | Admitting: SURGERY
Payer: MEDICARE

## 2024-10-09 ENCOUNTER — ANESTHESIA (OUTPATIENT)
Dept: SURGERY | Facility: OTHER | Age: 74
End: 2024-10-09
Payer: MEDICARE

## 2024-10-09 ENCOUNTER — HOSPITAL ENCOUNTER (OUTPATIENT)
Facility: OTHER | Age: 74
Discharge: HOME | End: 2024-10-09
Attending: SURGERY | Admitting: SURGERY
Payer: MEDICARE

## 2024-10-09 ENCOUNTER — PATIENT MESSAGE (OUTPATIENT)
Dept: HEMATOLOGY/ONCOLOGY | Facility: CLINIC | Age: 74
End: 2024-10-09
Payer: MEDICARE

## 2024-10-09 ENCOUNTER — ANESTHESIA EVENT (OUTPATIENT)
Dept: SURGERY | Facility: OTHER | Age: 74
End: 2024-10-09
Payer: MEDICARE

## 2024-10-09 VITALS
SYSTOLIC BLOOD PRESSURE: 130 MMHG | TEMPERATURE: 96 F | OXYGEN SATURATION: 95 % | RESPIRATION RATE: 18 BRPM | DIASTOLIC BLOOD PRESSURE: 66 MMHG | HEART RATE: 63 BPM

## 2024-10-09 DIAGNOSIS — C50.411 MALIGNANT NEOPLASM OF UPPER-OUTER QUADRANT OF RIGHT BREAST IN FEMALE, ESTROGEN RECEPTOR POSITIVE: ICD-10-CM

## 2024-10-09 DIAGNOSIS — C50.919 BREAST CANCER: ICD-10-CM

## 2024-10-09 DIAGNOSIS — Z17.0 MALIGNANT NEOPLASM OF UPPER-OUTER QUADRANT OF RIGHT BREAST IN FEMALE, ESTROGEN RECEPTOR POSITIVE: Primary | ICD-10-CM

## 2024-10-09 DIAGNOSIS — Z17.0 MALIGNANT NEOPLASM OF UPPER-OUTER QUADRANT OF RIGHT BREAST IN FEMALE, ESTROGEN RECEPTOR POSITIVE: ICD-10-CM

## 2024-10-09 DIAGNOSIS — R92.8 ABNORMAL FINDING ON BREAST IMAGING: ICD-10-CM

## 2024-10-09 DIAGNOSIS — C50.919 INVASIVE LOBULAR CARCINOMA OF BREAST IN FEMALE: ICD-10-CM

## 2024-10-09 DIAGNOSIS — C50.411 MALIGNANT NEOPLASM OF UPPER-OUTER QUADRANT OF RIGHT BREAST IN FEMALE, ESTROGEN RECEPTOR POSITIVE: Primary | ICD-10-CM

## 2024-10-09 PROCEDURE — 88307 TISSUE EXAM BY PATHOLOGIST: CPT | Mod: 26,,, | Performed by: PATHOLOGY

## 2024-10-09 PROCEDURE — 88341 IMHCHEM/IMCYTCHM EA ADD ANTB: CPT | Mod: 26,,, | Performed by: PATHOLOGY

## 2024-10-09 PROCEDURE — 71000016 HC POSTOP RECOV ADDL HR: Performed by: SURGERY

## 2024-10-09 PROCEDURE — 71000015 HC POSTOP RECOV 1ST HR: Performed by: SURGERY

## 2024-10-09 PROCEDURE — 36000706: Performed by: SURGERY

## 2024-10-09 PROCEDURE — 88302 TISSUE EXAM BY PATHOLOGIST: CPT | Performed by: PATHOLOGY

## 2024-10-09 PROCEDURE — 63600175 PHARM REV CODE 636 W HCPCS: Mod: JZ,JG | Performed by: SURGERY

## 2024-10-09 PROCEDURE — 88342 IMHCHEM/IMCYTCHM 1ST ANTB: CPT | Mod: 26,,, | Performed by: PATHOLOGY

## 2024-10-09 PROCEDURE — 25000003 PHARM REV CODE 250: Performed by: STUDENT IN AN ORGANIZED HEALTH CARE EDUCATION/TRAINING PROGRAM

## 2024-10-09 PROCEDURE — 19301 PARTIAL MASTECTOMY: CPT | Mod: 50,,, | Performed by: SURGERY

## 2024-10-09 PROCEDURE — 25000003 PHARM REV CODE 250: Performed by: ANESTHESIOLOGY

## 2024-10-09 PROCEDURE — 36000707: Performed by: SURGERY

## 2024-10-09 PROCEDURE — 88341 IMHCHEM/IMCYTCHM EA ADD ANTB: CPT | Mod: 59 | Performed by: PATHOLOGY

## 2024-10-09 PROCEDURE — 38525 BIOPSY/REMOVAL LYMPH NODES: CPT | Mod: 50,,, | Performed by: SURGERY

## 2024-10-09 PROCEDURE — 71000033 HC RECOVERY, INTIAL HOUR: Performed by: SURGERY

## 2024-10-09 PROCEDURE — 37000009 HC ANESTHESIA EA ADD 15 MINS: Performed by: SURGERY

## 2024-10-09 PROCEDURE — 71000039 HC RECOVERY, EACH ADD'L HOUR: Performed by: SURGERY

## 2024-10-09 PROCEDURE — 88307 TISSUE EXAM BY PATHOLOGIST: CPT | Performed by: PATHOLOGY

## 2024-10-09 PROCEDURE — A9520 TC99 TILMANOCEPT DIAG 0.5MCI: HCPCS | Performed by: SURGERY

## 2024-10-09 PROCEDURE — 63600175 PHARM REV CODE 636 W HCPCS: Performed by: ANESTHESIOLOGY

## 2024-10-09 PROCEDURE — 88342 IMHCHEM/IMCYTCHM 1ST ANTB: CPT | Mod: 59 | Performed by: PATHOLOGY

## 2024-10-09 PROCEDURE — 37000008 HC ANESTHESIA 1ST 15 MINUTES: Performed by: SURGERY

## 2024-10-09 PROCEDURE — 76098 X-RAY EXAM SURGICAL SPECIMEN: CPT | Mod: TC

## 2024-10-09 PROCEDURE — 27201423 OPTIME MED/SURG SUP & DEVICES STERILE SUPPLY: Performed by: SURGERY

## 2024-10-09 PROCEDURE — 88302 TISSUE EXAM BY PATHOLOGIST: CPT | Mod: 26,,, | Performed by: PATHOLOGY

## 2024-10-09 PROCEDURE — 38900 IO MAP OF SENT LYMPH NODE: CPT | Mod: 50,,, | Performed by: SURGERY

## 2024-10-09 PROCEDURE — 76098 X-RAY EXAM SURGICAL SPECIMEN: CPT | Mod: 26,,, | Performed by: RADIOLOGY

## 2024-10-09 PROCEDURE — 63600175 PHARM REV CODE 636 W HCPCS: Performed by: STUDENT IN AN ORGANIZED HEALTH CARE EDUCATION/TRAINING PROGRAM

## 2024-10-09 RX ORDER — PROPOFOL 10 MG/ML
VIAL (ML) INTRAVENOUS
Status: DISCONTINUED | OUTPATIENT
Start: 2024-10-09 | End: 2024-10-09

## 2024-10-09 RX ORDER — FAMOTIDINE 20 MG/1
20 TABLET, FILM COATED ORAL
Status: COMPLETED | OUTPATIENT
Start: 2024-10-09 | End: 2024-10-09

## 2024-10-09 RX ORDER — PROCHLORPERAZINE EDISYLATE 5 MG/ML
5 INJECTION INTRAMUSCULAR; INTRAVENOUS EVERY 30 MIN PRN
Status: DISCONTINUED | OUTPATIENT
Start: 2024-10-09 | End: 2024-10-09 | Stop reason: HOSPADM

## 2024-10-09 RX ORDER — DEXAMETHASONE SODIUM PHOSPHATE 4 MG/ML
INJECTION, SOLUTION INTRA-ARTICULAR; INTRALESIONAL; INTRAMUSCULAR; INTRAVENOUS; SOFT TISSUE
Status: DISCONTINUED | OUTPATIENT
Start: 2024-10-09 | End: 2024-10-09

## 2024-10-09 RX ORDER — ONDANSETRON HYDROCHLORIDE 2 MG/ML
INJECTION, SOLUTION INTRAMUSCULAR; INTRAVENOUS
Status: DISCONTINUED | OUTPATIENT
Start: 2024-10-09 | End: 2024-10-09

## 2024-10-09 RX ORDER — HYDROCODONE BITARTRATE AND ACETAMINOPHEN 5; 325 MG/1; MG/1
1 TABLET ORAL EVERY 6 HOURS PRN
Qty: 10 TABLET | Refills: 0 | Status: SHIPPED | OUTPATIENT
Start: 2024-10-09

## 2024-10-09 RX ORDER — FENTANYL CITRATE 50 UG/ML
INJECTION, SOLUTION INTRAMUSCULAR; INTRAVENOUS
Status: DISCONTINUED | OUTPATIENT
Start: 2024-10-09 | End: 2024-10-09

## 2024-10-09 RX ORDER — PROPOFOL 10 MG/ML
VIAL (ML) INTRAVENOUS CONTINUOUS PRN
Status: DISCONTINUED | OUTPATIENT
Start: 2024-10-09 | End: 2024-10-09

## 2024-10-09 RX ORDER — HYDROCODONE BITARTRATE AND ACETAMINOPHEN 5; 325 MG/1; MG/1
1 TABLET ORAL EVERY 6 HOURS PRN
Qty: 10 TABLET | Refills: 0 | Status: CANCELLED | OUTPATIENT
Start: 2024-10-09

## 2024-10-09 RX ORDER — SODIUM CHLORIDE, SODIUM LACTATE, POTASSIUM CHLORIDE, CALCIUM CHLORIDE 600; 310; 30; 20 MG/100ML; MG/100ML; MG/100ML; MG/100ML
INJECTION, SOLUTION INTRAVENOUS CONTINUOUS
Status: DISCONTINUED | OUTPATIENT
Start: 2024-10-09 | End: 2024-10-09 | Stop reason: HOSPADM

## 2024-10-09 RX ORDER — HYDROMORPHONE HYDROCHLORIDE 2 MG/ML
0.4 INJECTION, SOLUTION INTRAMUSCULAR; INTRAVENOUS; SUBCUTANEOUS EVERY 5 MIN PRN
Status: DISCONTINUED | OUTPATIENT
Start: 2024-10-09 | End: 2024-10-09 | Stop reason: HOSPADM

## 2024-10-09 RX ORDER — ACETAMINOPHEN 325 MG/1
975 TABLET ORAL
Status: COMPLETED | OUTPATIENT
Start: 2024-10-09 | End: 2024-10-09

## 2024-10-09 RX ORDER — SODIUM CHLORIDE 9 MG/ML
INJECTION, SOLUTION INTRAVENOUS CONTINUOUS
Status: DISCONTINUED | OUTPATIENT
Start: 2024-10-09 | End: 2024-10-09 | Stop reason: HOSPADM

## 2024-10-09 RX ORDER — OXYCODONE HYDROCHLORIDE 5 MG/1
5 TABLET ORAL
Status: DISCONTINUED | OUTPATIENT
Start: 2024-10-09 | End: 2024-10-09 | Stop reason: HOSPADM

## 2024-10-09 RX ORDER — SODIUM CHLORIDE 0.9 % (FLUSH) 0.9 %
3 SYRINGE (ML) INJECTION
Status: DISCONTINUED | OUTPATIENT
Start: 2024-10-09 | End: 2024-10-09 | Stop reason: HOSPADM

## 2024-10-09 RX ORDER — CEFAZOLIN 2 G/1
INJECTION, POWDER, FOR SOLUTION INTRAMUSCULAR; INTRAVENOUS
Status: DISCONTINUED | OUTPATIENT
Start: 2024-10-09 | End: 2024-10-09

## 2024-10-09 RX ORDER — DIPHENHYDRAMINE HYDROCHLORIDE 50 MG/ML
25 INJECTION INTRAMUSCULAR; INTRAVENOUS EVERY 6 HOURS PRN
Status: DISCONTINUED | OUTPATIENT
Start: 2024-10-09 | End: 2024-10-09 | Stop reason: HOSPADM

## 2024-10-09 RX ORDER — BUPIVACAINE HYDROCHLORIDE 2.5 MG/ML
INJECTION, SOLUTION EPIDURAL; INFILTRATION; INTRACAUDAL
Status: DISCONTINUED | OUTPATIENT
Start: 2024-10-09 | End: 2024-10-09 | Stop reason: HOSPADM

## 2024-10-09 RX ORDER — ROCURONIUM BROMIDE 10 MG/ML
INJECTION, SOLUTION INTRAVENOUS
Status: DISCONTINUED | OUTPATIENT
Start: 2024-10-09 | End: 2024-10-09

## 2024-10-09 RX ORDER — LIDOCAINE HYDROCHLORIDE 20 MG/ML
INJECTION, SOLUTION EPIDURAL; INFILTRATION; INTRACAUDAL; PERINEURAL
Status: DISCONTINUED | OUTPATIENT
Start: 2024-10-09 | End: 2024-10-09

## 2024-10-09 RX ORDER — PHENYLEPHRINE HYDROCHLORIDE 10 MG/ML
INJECTION INTRAVENOUS
Status: DISCONTINUED | OUTPATIENT
Start: 2024-10-09 | End: 2024-10-09

## 2024-10-09 RX ORDER — GLUCAGON 1 MG
1 KIT INJECTION
Status: DISCONTINUED | OUTPATIENT
Start: 2024-10-09 | End: 2024-10-09 | Stop reason: HOSPADM

## 2024-10-09 RX ORDER — LIDOCAINE HYDROCHLORIDE 10 MG/ML
0.5 INJECTION, SOLUTION EPIDURAL; INFILTRATION; INTRACAUDAL; PERINEURAL ONCE
Status: DISCONTINUED | OUTPATIENT
Start: 2024-10-09 | End: 2024-10-09 | Stop reason: HOSPADM

## 2024-10-09 RX ORDER — MEPERIDINE HYDROCHLORIDE 25 MG/ML
12.5 INJECTION INTRAMUSCULAR; INTRAVENOUS; SUBCUTANEOUS ONCE AS NEEDED
Status: DISCONTINUED | OUTPATIENT
Start: 2024-10-09 | End: 2024-10-09 | Stop reason: HOSPADM

## 2024-10-09 RX ORDER — CLINDAMYCIN PHOSPHATE 900 MG/50ML
900 INJECTION, SOLUTION INTRAVENOUS
Status: DISCONTINUED | OUTPATIENT
Start: 2024-10-09 | End: 2024-10-09 | Stop reason: HOSPADM

## 2024-10-09 RX ADMIN — LIDOCAINE HYDROCHLORIDE 70 MG: 20 INJECTION, SOLUTION EPIDURAL; INFILTRATION; INTRACAUDAL; PERINEURAL at 12:10

## 2024-10-09 RX ADMIN — GLYCOPYRROLATE 0.2 MG: 0.2 INJECTION, SOLUTION INTRAMUSCULAR; INTRAVITREAL at 12:10

## 2024-10-09 RX ADMIN — PROPOFOL 200 MG: 10 INJECTION, EMULSION INTRAVENOUS at 12:10

## 2024-10-09 RX ADMIN — FAMOTIDINE 20 MG: 20 TABLET, FILM COATED ORAL at 10:10

## 2024-10-09 RX ADMIN — ONDANSETRON 4 MG: 2 INJECTION INTRAMUSCULAR; INTRAVENOUS at 11:10

## 2024-10-09 RX ADMIN — DEXAMETHASONE SODIUM PHOSPHATE 8 MG: 4 INJECTION, SOLUTION INTRAMUSCULAR; INTRAVENOUS at 12:10

## 2024-10-09 RX ADMIN — FENTANYL CITRATE 100 MCG: 0.05 INJECTION, SOLUTION INTRAMUSCULAR; INTRAVENOUS at 11:10

## 2024-10-09 RX ADMIN — ACETAMINOPHEN 975 MG: 325 TABLET, FILM COATED ORAL at 10:10

## 2024-10-09 RX ADMIN — PHENYLEPHRINE HYDROCHLORIDE 100 MCG: 10 INJECTION INTRAVENOUS at 12:10

## 2024-10-09 RX ADMIN — TILMANOCEPT 499 MICROCURIE: KIT at 02:10

## 2024-10-09 RX ADMIN — SODIUM CHLORIDE, SODIUM LACTATE, POTASSIUM CHLORIDE, AND CALCIUM CHLORIDE: 600; 310; 30; 20 INJECTION, SOLUTION INTRAVENOUS at 11:10

## 2024-10-09 RX ADMIN — SODIUM CHLORIDE, SODIUM LACTATE, POTASSIUM CHLORIDE, AND CALCIUM CHLORIDE: 600; 310; 30; 20 INJECTION, SOLUTION INTRAVENOUS at 01:10

## 2024-10-09 RX ADMIN — SUGAMMADEX 200 MG: 100 INJECTION, SOLUTION INTRAVENOUS at 03:10

## 2024-10-09 RX ADMIN — CEFAZOLIN 2 G: 2 INJECTION, POWDER, FOR SOLUTION INTRAMUSCULAR; INTRAVENOUS at 12:10

## 2024-10-09 RX ADMIN — PROPOFOL 50 MCG/KG/MIN: 10 INJECTION, EMULSION INTRAVENOUS at 12:10

## 2024-10-09 RX ADMIN — ROCURONIUM BROMIDE 30 MG: 10 SOLUTION INTRAVENOUS at 12:10

## 2024-10-09 RX ADMIN — TILMANOCEPT 508 MICROCURIE: KIT at 02:10

## 2024-10-09 RX ADMIN — PHENYLEPHRINE HYDROCHLORIDE 0.3 MCG/KG/MIN: 10 INJECTION INTRAVENOUS at 12:10

## 2024-10-09 NOTE — ANESTHESIA POSTPROCEDURE EVALUATION
Anesthesia Post Evaluation    Patient: Beth Cantu    Procedure(s) Performed: Procedure(s) (LRB):  MASTECTOMY, PARTIAL BILATERAL (Bilateral)  BIOPSY, LYMPH NODE, SENTINEL (Bilateral)  INJECTION, FOR SENTINEL NODE IDENTIFICATION (Bilateral)    Final Anesthesia Type: general      Patient location during evaluation: PACU  Patient participation: Yes- Able to Participate  Level of consciousness: awake and alert  Post-procedure vital signs: reviewed and stable  Pain management: adequate  Airway patency: patent  LORRAINE mitigation strategies: Extubation while patient is awake  PONV status at discharge: No PONV  Anesthetic complications: no      Cardiovascular status: hemodynamically stable  Respiratory status: unassisted  Hydration status: euvolemic  Follow-up not needed.              Vitals Value Taken Time   /58 10/09/24 1547   Temp 36.3 °C (97.3 °F) 10/09/24 1517   Pulse 57 10/09/24 1556   Resp 18 10/09/24 1545   SpO2 98 % 10/09/24 1556   Vitals shown include unfiled device data.      No case tracking events are documented in the log.      Pain/Sven Score: Pain Rating Prior to Med Admin: 0 (10/9/2024 10:48 AM)  Sven Score: 7 (10/9/2024  3:17 PM)

## 2024-10-09 NOTE — DISCHARGE INSTRUCTIONS
POSTOPERATIVE INSTRUCTIONS FOLLOWING BIOPSY OR LUMPECTOMY    The following are post-operative instructions that will help you to recover from your surgery.  Please read over these instructions carefully and contact us if we can answer any of your questions or concerns.    Dressing/breast binder (surgi-bra)  A surgical bra may be placed around your chest after your surgery.  If you are given the bra, please wear it as close to 24 hours a day as possible until your post-operative clinic appointment.  If the elastic around the bra irritates your skin, you may wear a soft t-shirt underneath the bra.  You may go without wearing the bra long enough to shower, to launder and dry the bra.  If the bra is extremely uncomfortable, you may wear a supportive sports bra instead after 2 days.  You may shower the day after surgery.  Do not take a tub bath and do not soak the surgical site.    Activity   You should be able to return to your regular activities 2 days after your surgery.  However, do not engage in strenuous activities in which you use your upper body such as:  golf, tennis, aerobics, washing windows, raking the yard, mopping, vacuuming, heavy lifting (e.g children) until you are seen for your follow-up appointment in clinic.    Medication for pain  You may find that over the counter pain medications may be sufficient for your pain.  You will be given a prescription for pain medication for more severe pain.  You should not drive or operate machinery while taking these.  Please take narcotics with food.  Narcotics can cause, or worse, constipation.  You will need to increase your fluid intake, eat high fiber foods (such as fruits and bran) and make sure that you are up and walking. You may need to take an over the counter stool softener for constipation.    Please report the following:  Temperature greater than 101 degrees  Discharge or bad odor from the wound  Excessive bleeding, such as bloody dressing or extreme  bruising  Redness at incision and/or drain sites  Swelling or buildup of fluid around incision    Additional information  I will see you approximately 2 weeks following your surgery.  If this follow-up appointment has not been made, please call the office.    If you have any questions or problems, please call my office or my nurse.    Dr. Sarah Mims MD  [phone]    After hours and on weekends, you may call the main [facility] line at [phone] and ask to have the general surgery resident paged or have me paged.

## 2024-10-09 NOTE — BRIEF OP NOTE
[facility] - Surgery ([place])  Brief Operative Note    Surgery Date: 10/9/2024     Surgeons and Role:     * Sarah Mims MD - Primary    Assisting Surgeon: None    Pre-op Diagnosis:  Malignant neoplasm of upper-outer quadrant of right breast in female, estrogen receptor positive [C50.411, Z17.0]    Post-op Diagnosis:  Post-Op Diagnosis Codes:     * Malignant neoplasm of upper-outer quadrant of right breast in female, estrogen receptor positive [C50.411, Z17.0]    Procedure(s) (LRB):  MASTECTOMY, PARTIAL BILATERAL (Bilateral)  BIOPSY, LYMPH NODE, SENTINEL (Bilateral)  INJECTION, FOR SENTINEL NODE IDENTIFICATION (Bilateral)    Anesthesia: General    Operative Findings: Left lumpectomy with sentinel lymph node biopsy. Right lumpectomy with sentinel lymph node biopsy. Both clips were in the radiographed specimens.    Estimated Blood Loss: < 50 cc         Specimens:   Specimen (24h ago, onward)      None              Discharge Note    OUTCOME: Patient tolerated treatment/procedure well without complication and is now ready for discharge.    DISPOSITION: Home or Self Care    FINAL DIAGNOSIS:  Malignant neoplasm of upper-outer quadrant of right breast in female, estrogen receptor positive     FOLLOWUP: In clinic    DISCHARGE INSTRUCTIONS:    Discharge Procedure Orders   Diet Adult Regular     No driving until:   Order Comments: Once no longer on pain medicaiton     Notify your health care provider if you experience any of the following:  temperature >100.4     Notify your health care provider if you experience any of the following:  persistent nausea and vomiting or diarrhea     Notify your health care provider if you experience any of the following:  severe uncontrolled pain     Notify your health care provider if you experience any of the following:  redness, tenderness, or signs of infection (pain, swelling, redness, odor or green/yellow discharge around incision site)     Notify your health care provider if you  experience any of the following:  difficulty breathing or increased cough     Notify your health care provider if you experience any of the following:  severe persistent headache     Notify your health care provider if you experience any of the following:  worsening rash     Notify your health care provider if you experience any of the following:  persistent dizziness, light-headedness, or visual disturbances     Notify your health care provider if you experience any of the following:  increased confusion or weakness     No dressing needed     Shower on day dressing removed (No bath)   Order Comments: OK to shower tomorrow

## 2024-10-09 NOTE — ANESTHESIA PROCEDURE NOTES
Intubation    Date/Time: 10/9/2024 12:05 PM    Performed by: Dayo Jewell CRNA  Authorized by: Bruce Busby MD    Intubation:     Induction:  Intravenous    Intubated:  Postinduction    Mask Ventilation:  Easy mask    Attempts:  1    Attempted By:  CRNA    Method of Intubation:  Video laryngoscopy    Blade:  Alonso 3    Laryngeal View Grade: Grade I - full view of cords      Difficult Airway Encountered?: No      Complications:  None    Airway Device:  Oral endotracheal tube    Airway Device Size:  7.0    Style/Cuff Inflation:  Cuffed (inflated to minimal occlusive pressure)    Tube secured:  21    Secured at:  The lips    Placement Verified By:  Capnometry and Colorimetric ETCO2 device    Complicating Factors:  None    Findings Post-Intubation:  BS equal bilateral and atraumatic/condition of teeth unchanged       sudden onset

## 2024-10-09 NOTE — TRANSFER OF CARE
Anesthesia Transfer of Care Note    Patient: Beth Cantu    Procedure(s) Performed: Procedure(s) (LRB):  MASTECTOMY, PARTIAL BILATERAL (Bilateral)  BIOPSY, LYMPH NODE, SENTINEL (Bilateral)  INJECTION, FOR SENTINEL NODE IDENTIFICATION (Bilateral)    Patient location: PACU    Anesthesia Type: general    Transport from OR: Transported from OR on 6-10 L/min O2 by face mask with adequate spontaneous ventilation    Post pain: adequate analgesia    Post assessment: no apparent anesthetic complications    Post vital signs: stable    Level of consciousness: awake    Nausea/Vomiting: no nausea/vomiting    Complications: none    Transfer of care protocol was followed      Last vitals: Visit Vitals  BP (!) 145/70   Pulse 63   Temp 36.8 °C (98.2 °F) (Oral)   Resp 18   LMP 02/17/1986 (Approximate)   SpO2 96%   Breastfeeding No

## 2024-10-09 NOTE — ANESTHESIA PREPROCEDURE EVALUATION
10/09/2024  Beth Cantu is a 74 y.o., female.      Pre-op Assessment    I have reviewed the Patient Summary Reports.     I have reviewed the Nursing Notes. I have reviewed the NPO Status.   I have reviewed the Medications.     Review of Systems  Anesthesia Hx:  No problems with previous Anesthesia                Social:  Non-Smoker       Hematology/Oncology:  Hematology Normal   Oncology Normal                                   EENT/Dental:  EENT/Dental Normal           Pulmonary:  Pulmonary Normal                       Hepatic/GI:  Hepatic/GI Normal                 Musculoskeletal:  Arthritis               Neurological:      Headaches                                 Endocrine:   Hypothyroidism        Obesity / BMI > 30      Physical Exam  General: Cooperative and Alert    Airway:  Mallampati: II   Mouth Opening: Normal  TM Distance: Normal  Tongue: Normal  Neck ROM: Normal ROM    Dental:  Intact        Anesthesia Plan  Type of Anesthesia, risks & benefits discussed:    Anesthesia Type: Gen ETT  Intra-op Monitoring Plan: Standard ASA Monitors  Post Op Pain Control Plan: multimodal analgesia  Induction:  IV  Airway Plan: Video  Informed Consent: Informed consent signed with the Patient and all parties understand the risks and agree with anesthesia plan.  All questions answered.   ASA Score: 3  Anesthesia Plan Notes: Has recent labs, followed regularly by pcp dr negro Cummings For [facility] From Anesthesia Perspective.     .

## 2024-10-09 NOTE — OP NOTE
DATE OF PROCEDURE: 10/9/2024    SURGEON: Surgeons and Role:     * Sarah Mims MD - Primary  Assist: Nay Santos    PREOPERATIVE DIAGNOSIS: Invasive breast carcinoma of the bilateral breast upper outer quadrant right breast and upper inner quadrant left breast, estrogen positive    POSTOPERATIVE DIAGNOSIS: same    ANESTHESIA: local and general    PROCEDURES PERFORMED:   1. bilateral breast ultrasound with reflector localization partial mastectomy (lumpectomy) with excision for clear margins   2. bilateral axillary deep sentinel lymph node biopsy   3. injection of bilateral breast with technetium-labeled radiocolloid for sentinel lymph node identification  4. Identification of sentinel lymph node         PROCEDURE IN DETAIL:   The patient underwent informed consent.  The films were reviewed.    She was then brought to the Operating Room and placed in the supine position. local and general anesthesia was administered.    The bilateral breast was injected in the subareolar region with the technetium-labeled radiocolloid.   The bilateral breast, anterior chest, arm and axilla were then prepped and draped in a sterile fashion.     Using the gamma probe, activity was noted and localized in the right axilla. Local anesthetic with 1% lidocaine was injected into the skin where the incision will be placed. We made a small transverse inferior axillary incision over the area of activity. We then dissected down through the clavipectoral fascia using electrocautery, identifying a hot afferent lymphatic channel coming from the upper outer quadrant axillary tail of More breast tissue to a level 1 sentinel node, which was excised. It was dissected circumferentially with blunt dissection and the afferent and efferent lymphatics were tied together. The lymph node was labeled as specimen #1 for permanent sectioning, hot and not blue with an ex vivo count of 673. A total of 1 axillary sentinel lymph nodes were removed.  The  probe was placed in the cavity and there was no significant residual background radioactivity or blue dye noted in the axilla indicating adequate and appropriate sentinel lymph node biopsy. The cavity was then palpated and 0 further palpable nodes were noted. Any additional palpable nodes were sent to pathology for permanent section.       We then achieved hemostasis and irrigation was performed.  The incision was then closed with an interuppted 3-0 vicryl deep dermal followed by a running 4-0 vicryl subcuticular.    Next, we turned our attention to the right breast itself. Ultrasound and reflector was used to identify the breast mass at 10 o'clock.  The area if interest was identified with a clip within and then was marked for localization using ultrasound and reflector guidance.  An incision was made in the upper outer quadrant of the right breast over the anticipated tract of the lesion in a pb-tumoral location with a crescent of skin overlying.  The specimen was dissected circumferentially around the cancer.  We did dissect all the way down to, but not including the underlying pectoralis fascia.  The lumpectomy specimen was inked on the back table using green ink inferiorly, blue ink superiorly, orange ink laterally, yellow ink anteriorly, black ink posteriorly, and the red ink medially.  It was then fixed with acetic acid and submitted for specimen radiograph with the [facility], which confirmed the clip and area of interest within the specimen, and was interpreted in the operating room. Given the appearance and location of the lesion, additional margins were taken including inferior.       Within the lumpectomy cavity, hemostasis was achieved with cautery. The wound was irrigated until clear. There was no evidence of bleeding. It was closed in multiple layers with deep dermal and subcutaneous interrupted Vicryl sutures and a running 4-0 vicryl subcuticular skin closure.    Using the gamma probe, activity was  noted and localized in the left axilla. Local anesthetic with 1% lidocaine was injected into the skin where the incision will be placed. We made a small transverse inferior axillary incision over the area of activity. We then dissected down through the clavipectoral fascia using electrocautery, identifying a hot afferent lymphatic channel coming from the upper outer quadrant axillary tail of More breast tissue to a level 1 sentinel node, which was excised. It was dissected circumferentially with blunt dissection and the afferent and efferent lymphatics were tied together. The lymph node was labeled as specimen #1 for permanent sectioning, hot and not blue with an ex vivo count of 2412. A total of 2 axillary sentinel lymph nodes were removed.  The probe was placed in the cavity and there was no significant residual background radioactivity or blue dye noted in the axilla indicating adequate and appropriate sentinel lymph node biopsy. The cavity was then palpated and 0 further palpable nodes were noted. Any additional palpable nodes were sent to pathology for permanent section.       We then achieved hemostasis and irrigation was performed.  The incision was then closed with an interuppted 3-0 vicryl deep dermal followed by a running 4-0 vicryl subcuticular.    Next, we turned our attention to the left breast itself. Ultrasound and reflector was used to identify the breast mass at 11 o'clock.  The area if interest was identified with a clip within and then was marked for localization using ultrasound and reflector guidance.  An incision was made in the upper inner quadrant of the left breast over the anticipated tract of the lesion in a pb-tumoral location.  The specimen was dissected circumferentially around the cancer.  We did not dissect all the way down to the underlying pectoralis fascia.  The lumpectomy specimen was inked on the back table using green ink inferiorly, blue ink superiorly, orange ink  laterally, yellow ink anteriorly, black ink posteriorly, and the red ink medially.  It was then fixed with acetic acid and submitted for specimen radiograph with the [facility], which confirmed the clip and area of interest within the specimen, and was interpreted in the operating room. Given the appearance and location of the lesion, additional margins were taken including medial and lateral.       Within the lumpectomy cavity, hemostasis was achieved with cautery. The wound was irrigated until clear. There was no evidence of bleeding. It was closed in multiple layers with deep dermal and subcutaneous interrupted Vicryl sutures and a running 4-0 vicryl subcuticular skin closure.      Dermabond was applied. Sterile fluff gauze was placed and a post-procedure bra was placed. She tolerated the procedure well without complication and was turned over to Anesthesia for transport to the recovery area in a satisfactory condition. All specimens were sent to Pathology for permanent sectioning.    ESTIMATED BLOOD LOSS: 10ml    COMPLICATIONS: none    DISPOSITION:  PACU--hemodynamically stable    ATTESTATION:  I performed the procedure.    Glenn Node Biopsy for Breast Cancer - Right  Operation performed with curative intent Yes   Tracer(s) used to identify sentinel nodes in the upfront surgery (non-neoadjuvant) setting Radioactive tracer   Tracer(s) used to identify sentinel nodes in the neoadjuvant setting N/A   All nodes (colored or non-colored) present at the end of a dye-filled lymphatic channel were removed N/A   All significantly radioactive nodes were removed Yes   All palpably suspicious nodes were removed N/A   Biopsy-proven positive nodes marked with clips prior to chemotherapy were identified and removed N/A     Glenn Node Biopsy for Breast Cancer - Left  Operation performed with curative intent Yes   Tracer(s) used to identify sentinel nodes in the upfront surgery (non-neoadjuvant) setting Radioactive tracer    Tracer(s) used to identify sentinel nodes in the neoadjuvant setting N/A   All nodes (colored or non-colored) present at the end of a dye-filled lymphatic channel were removed N/A   All significantly radioactive nodes were removed Yes   All palpably suspicious nodes were removed N/A   Biopsy-proven positive nodes marked with clips prior to chemotherapy were identified and removed N/A

## 2024-10-09 NOTE — BRIEF OP NOTE
Certification of Assistant at Surgery       Surgery Date: 10/9/2024     Participating Surgeons:  Surgeons and Role:     * Sarah Mims MD - Primary    Procedures:  Procedure(s) (LRB):  MASTECTOMY, PARTIAL BILATERAL (Bilateral)  BIOPSY, LYMPH NODE, SENTINEL (Bilateral)  INJECTION, FOR SENTINEL NODE IDENTIFICATION (Bilateral)    Assistant Surgeon's Certification of Necessity:  I understand that section 1842 (b) (6) (d) of the Social Security Act generally prohibits Medicare Part B reasonable charge payment for the services of assistants at surgery in [facility] hospitals when qualified residents are available to furnish such services. I certify that the services for which payment is claimed were medically necessary, and that no qualified resident was available to perform the services. I further understand that these services are subject to post-payment review by the Medicare carrier.      Nay Santos PA-C    10/09/2024  3:17 PM

## 2024-10-10 ENCOUNTER — TELEPHONE (OUTPATIENT)
Dept: SURGERY | Facility: CLINIC | Age: 74
End: 2024-10-10
Payer: MEDICARE

## 2024-10-10 NOTE — TELEPHONE ENCOUNTER
Called pt for POD1 check in -- she is doing well. She has minimal to no pain.   All of her questions were answered.   We will see her for her PO visit 10/31

## 2024-10-10 NOTE — PLAN OF CARE
[facility] Aretha Cantu has met all discharge criteria from Phase II. Vital Signs are stable, ambulating  without difficulty. Discharge instructions given, patient verbalized understanding. Discharged from facility via wheelchair in stable condition.

## 2024-10-15 ENCOUNTER — PATIENT MESSAGE (OUTPATIENT)
Dept: SURGERY | Facility: CLINIC | Age: 74
End: 2024-10-15
Payer: MEDICARE

## 2024-10-18 ENCOUNTER — TELEPHONE (OUTPATIENT)
Dept: SURGERY | Facility: CLINIC | Age: 74
End: 2024-10-18
Payer: MEDICARE

## 2024-10-18 ENCOUNTER — PATIENT MESSAGE (OUTPATIENT)
Dept: SURGERY | Facility: CLINIC | Age: 74
End: 2024-10-18
Payer: MEDICARE

## 2024-10-18 LAB
COMMENT: ABNORMAL
FINAL PATHOLOGIC DIAGNOSIS: ABNORMAL
GROSS: ABNORMAL
Lab: ABNORMAL

## 2024-10-30 NOTE — PROGRESS NOTES
[facility]/ [facility] at [facility]      Chief Complaint: HR+ breast cancer      Cancer Staging   Malignant neoplasm of upper-outer quadrant of right breast in female, estrogen receptor positive  Staging form: Breast, AJCC 8th Edition  - Pathologic stage from 2024: Stage IA (pT1c, pN0, cM0, G1, ER+, ID+, HER2-) - Signed by Sarah Eduardo MD on 2024      HPI:  Beth Cantu is a 74 y.o. female who presents today for evaluation of newly diagnosed breast cancer. Her oncologic history is as follows:    -initially had abnonormal screening MMG 23; Diagnostic imaging 2024 significant for Rt breast asymmetry, likely benign. Recommended 6mo repeat follow up  -24 diagnostic MMG showed an asymmetry in the Rt breast UOQ spanning 1.2cm; US revealed a 1.1 x 1.0 x 0.9cm mass in the Rt breast at 10oclock, no Rt axillary LAD. Biopsy 24  confirmed ILC, grade 1. ER 90%, ID 95%, Her2 0, Ki67 20%  -24 MRI breast showed a L breast oval mass measuring 0.6cm x 0.5cm x 0.6cm in the UIQ, as well as Rt breast mass measuring 1.4 x 1.1 x 1.1cm  -10/3/24 L breast mass biopsy confirmed IDC grade 1. ER 95%, ID 95%, Her2 1+, Ki67 25%  -10/9/24 s/p Rt lumpectomy, final pathology: ILC measuring 1.5cm, grade 1. Associated LCIS, ADH. Negative margins (closest 4.5mm), 0/1LN. pT1cN0.    L breast lumpectomy final pathology: IDC measuring 7mm, grade 1. Associated DCIS measuring 1mm. Negative margins. 0/2 LN. pT1bN0      Ms. Cantu presents today for evaluation. Notes that she has recovered well from surgery with minimal post-op pain. Her medical history is notable for htn and hypothyroidism.  FH of breast cancer in her maternal cousin.      Gyn History:   Menarche: 9yo  Menopause: 35yo, s/p partial hysterectomy    Age at first pregnancy: 27yo  : Yes  HRT: Yes briefly, x 6mo    Social History     Tobacco Use    Smoking status: Never     Smokeless tobacco: Never   Substance Use Topics    Alcohol use: Yes     Alcohol/week: 7.0 standard drinks of alcohol     Types: 7 Glasses of wine per week     Comment: Socially - Wine    Drug use: No     Family History   Problem Relation Name Age of Onset    Cirrhosis Father      Alzheimer's disease Father      Emphysema Father      Pneumonia Mother      Cancer Maternal Aunt      Cancer Maternal Uncle      Cancer Maternal Cousin      Breast cancer Maternal Cousin  30        mastectomy    Cancer Maternal Cousin      Cancer Maternal Cousin      Colon cancer Neg Hx      Ovarian cancer Neg Hx       Past Medical History:   Diagnosis Date    Absence of both cervix and uterus, acquired     TVH    Basal cell carcinoma (BCC)     1990's    Breast cyst     Cancer     right breast    H/O bone density study 07/21/2014    Normal    H/O colonoscopy 2005    Cologard done 10/2016, Negative    H/O mammogram 2015    Normal      Herpes simplex virus (HSV) infection     Hypothyroidism associated with surgical procedure     Migraines     MVP (mitral valve prolapse)     Osteoarthritis     Overweight     Thyroid disease     Vaginal Pap smear 2006    Normal     Past Surgical History:   Procedure Laterality Date    ARTHROPLASTY, KNEE, TOTAL, SIGHT ASSISTED Right 06/28/2021    Procedure: ARTHROPLASTY, KNEE, TOTAL, SIGHT ASSISTED;  Surgeon: Dionicio Fonseca MD;  Location: [facility];  Service: Orthopedics;  Laterality: Right;    BREAST CYST ASPIRATION      CHOLECYSTECTOMY  2003    COLONOSCOPY      DILATION AND CURETTAGE OF UTERUS  1979    Missed Ab    HYSTERECTOMY  1986    TVH - menorrhagia    INJECTION FOR SENTINEL NODE IDENTIFICATION Bilateral 10/9/2024    Procedure: INJECTION, FOR SENTINEL NODE IDENTIFICATION;  Surgeon: Sarah Mims MD;  Location: [facility];  Service: General;  Laterality: Bilateral;    JOINT REPLACEMENT      left knee    KNEE SURGERY Left 2013    Replacement - In [place]    MASTECTOMY, PARTIAL Bilateral 10/9/2024     Procedure: MASTECTOMY, PARTIAL BILATERAL;  Surgeon: Sarah Mims MD;  Location: [facility] OR;  Service: General;  Laterality: Bilateral;    SENTINEL LYMPH NODE BIOPSY Bilateral 10/9/2024    Procedure: BIOPSY, LYMPH NODE, SENTINEL;  Surgeon: Sarah Mims MD;  Location: [facility] OR;  Service: General;  Laterality: Bilateral;    THYROIDECTOMY  2013    Partial 1973    TONSILLECTOMY      TYMPANOSTOMY TUBE PLACEMENT Left        Patient Active Problem List   Diagnosis    Acquired absence of both cervix and uterus (1986)    Migraine    Osteoarthritis    MVP (mitral valve prolapse)    Hypothyroidism associated with surgical procedure    Severe obesity (BMI 35.0-39.9) with comorbidity    Invasive lobular carcinoma of breast in female    At risk for lymphedema    Malignant neoplasm of upper-outer quadrant of right breast in female, estrogen receptor positive       Current Outpatient Medications   Medication Instructions    ALPRAZolam (XANAX) 0.25 mg, Oral, Nightly PRN    b complex vitamins tablet 1 tablet, Daily    diazePAM (VALIUM) 2 mg, Oral, On Call Procedure    HYDROcodone-acetaminophen (NORCO) 5-325 mg per tablet 1 tablet, Oral, Every 6 hours PRN    inositol/choline/biofla/vitB,C (LIPO-FLAVONOID ORAL) Take by mouth.    levothyroxine (SYNTHROID) 150 mcg, Daily    loratadine (CLARITIN) 10 mg, Oral, Daily    multivitamin capsule 1 capsule, Daily    ondansetron (ZOFRAN-ODT) 8 mg, Oral, Every 8 hours PRN    propranoloL (INDERAL LA) 120 MG 24 hr capsule Inderal LA   120 mg    sumatriptan (IMITREX) 25 MG Tab No dose, route, or frequency recorded.    vitamin D (VITAMIN D3) 1,000 Units, Daily       Review of Systems:   Answers submitted by the patient for this visit:  Review of Systems Questionnaire (Submitted on 10/24/2024)  appetite change : No  unexpected weight change: No  mouth sores: No  visual disturbance: No  cough: No  shortness of breath: No  chest pain: No  abdominal pain: No  diarrhea: No  frequency: No  back pain:  "No  rash: No  headaches: No  adenopathy: No  nervous/ anxious: No      PHYSICAL EXAM:  BP (!) 145/83 (Patient Position: Sitting)   Pulse 72   Ht 5' 7" (1.702 m)   Wt 106.1 kg (233 lb 14.5 oz)   LMP 02/17/1986 (Approximate) Comment: TVH 1986  BMI 36.64 kg/m²     ECOG 1  General: well appearing, in no apparent distress  HEENT: Normocephalic, EOMI, anicteric sclerae, MMM  Neck: supple, without cervical or supraclavicular lymphadenopathy.  Heart: well-perfused  Lungs: no increased wob  Breast: s/p bilateral mastectomies w well-healed incisions  Abdomen: Soft, nontender, nondistended  Extremities: No LE edema or joint effusion  Skin: warm, well-perfused, no rash  Neurologic: Alert and oriented x 4, normal speech and gait   Psychiatric: Conversing appropriately with providers throughout today's encounter.      Pertinent Labs & Imaging:  Pathology Results  (Last 30 days)                 10/09/24 1428  Specimen to Pathology, Surgery Breast (Abnormal) Final result    Narrative:  Pre-op Diagnosis: Malignant neoplasm of upper-outer quadrant of right    breast in female, estrogen receptor positive   [C50.411, Z17.0]   Procedure(s):   MASTECTOMY, PARTIAL BILATERAL   BIOPSY, LYMPH NODE, SENTINEL   INJECTION, FOR SENTINEL NODE IDENTIFICATION   LYMPHADENECTOMY, AXILLARY   Number of specimens: 9   Name of specimens:   1. RIGHT LUMPECTOMY GREEN INFERIOR BLUE SUPERIOR ORANGE   LATERAL YELLOW ANTERIOR BLACK POSTERIOR RED MEDIAL - SENT   FRESH   2. RIGHT  AXILLARY SENTINEL LYMPH NODE  - SENT FRESH   3. RIGHT  BREAST NEW INFERIOR MARGIN INK MARKS NEW MARGIN -   SENT FRESH   4. LEFT AXILLARY SENTINEL LYMPH NODE HOT 2412 - SENT FRESH   5. LEFT AXILLARY SENTINEL LYMPH NODE HOT 1543 - SENT FRESH   6. LEFT LUMPECTOMY GREEN INFERIOR BLUE SUPERIOR ORANGE   LATERAL YELLOW ANTERIOR BLACK POSTERIOR RED MEDIAL - SENT   FRESH   7. LEFT BREAST SKIN - SENT FRESH   8. LEFT BREAST NEW MEDIAL MARGIN INK MARKS NEW MARGIN - SENT   FRESH   9. " LEFT BREAST NEW LATERAL MARGIN INK MARKS NEW MARGIN - SENT   FRESH   Release to patient->Immediate   Specimen total (fresh, frozen, permanent):->9       10/03/24 0927  Specimen to Pathology, Radiology Breast, needle biopsy Final result    Narrative:  Time in formalin: 9:27 AM, time to formalin 6 min   Release to patient->Immediate             Reviewed recent labs, imaging and pathology.     Assessment & Plan:  Ms. Cantu is a prisca 75yo woman with recently diagnosed bilateral HR+ breast cancer.    She is s/p bilateral lumpectomies with final pathology showing Rt breast pT1cN0 disease and Left breast pT1bN0 disease. Today we discussed her recent diagnosis and the natural history of early stage, HR+ breast cancer. I reviewed that the standard of care for clinically low risk ER+ disease is genomic assay to further evaluate recurrence risk and potential benefit of chemotherapy. OncotypeDX is a genomic assay of 21 genes unique to the tumor, assigning a score based on expression of high- or low-risk genes. The test reveals prognostic and predictive results based on NSABP B-14, NSABP B-20, TAILORx and RxPONDER clinical trials. These trials demonstrated that patients with low recurrence score do not benefit from the addition of chemotherapy to adjuvant endocrine therapy, while those patient with a high recurrence score do benefit from the addition of chemotherapy to adjuvant endocrine therapy.    Given her age >76yo and low clinical risk breast cancers (grade 1, very HR+, node negative), will forego Oncotype testing a I would be unlikely to recommend chemotherapy in this setting. Instead will proceed with adjuvant radiation and ET. We discussed that endocrine therapy with aromatase inhibitors are the optimal treatment for estrogen positive breast cancer in postmenopausal women and in some premenopausal women with the addition of ovarian suppression. I reviewed common side effects of AI including arthralgias, hot flashes  and vaginal dryness. AI-associated arthralgias can range from mild discomfort that goes away by itself, to severe achiness that may require medication with over-the-counter, non-steroidal anti-inflammatory medications like Ibuprofen.  I also discussed the possible side effect of bone loss or osteoporosis. To help prevent this side effect, we advised that she take daily supplementation with Calcium 1200mg daily and Vitamin D 1000IU daily. She can buy these supplements, such as Oscal-D® or Caltrate®, at most local stores. If she has osteopenia or osteoporosis on bone density, we will discuss Prolia in the near future. She was in agreement with the above plan.    #Bilateral HR+ breast cancers:  --proceed with adjuvant XRT  --will start anastrozole 1mg daily, goal treatment for 5 years  --due for screening MMG 5/2025  --DEXA 12/2022 wnl; repeat due 12/2024  --RTC 3mo for tolerance check       #Obesity: noted on vitals  --encouraged exercise and healthy BMI   --can discuss role of nutrition referral at follow up      All questions were answered to her apparent satisfaction. Will see her back in 3 months or sooner should the need arise.                Route Chart for Scheduling    Med Onc Chart Routing      Follow up with physician 3 months.   Follow up with ANDREAS    Infusion scheduling note    Injection scheduling note    Labs    Imaging DXA scan   DEXA due in 12/2024   Pharmacy appointment    Other referrals                    MDM includes  :    - Acute or chronic illness or injury that poses a threat to life or bodily function  - Review of prior external notes from unique source  - Independent review and explanation of 3+ results from unique tests  - Discussion of management and ordering 3+ unique tests  - Extensive discussion of treatment and management  - Prescription drug management  - Drug therapy requiring intensive monitoring for toxicity      Sarah Eduardo

## 2024-10-31 ENCOUNTER — OFFICE VISIT (OUTPATIENT)
Dept: RADIATION ONCOLOGY | Facility: CLINIC | Age: 74
End: 2024-10-31
Payer: MEDICARE

## 2024-10-31 ENCOUNTER — OFFICE VISIT (OUTPATIENT)
Dept: HEMATOLOGY/ONCOLOGY | Facility: CLINIC | Age: 74
End: 2024-10-31
Payer: MEDICARE

## 2024-10-31 ENCOUNTER — OFFICE VISIT (OUTPATIENT)
Dept: SURGERY | Facility: CLINIC | Age: 74
End: 2024-10-31
Payer: MEDICARE

## 2024-10-31 VITALS
HEART RATE: 72 BPM | SYSTOLIC BLOOD PRESSURE: 145 MMHG | HEIGHT: 67 IN | DIASTOLIC BLOOD PRESSURE: 83 MMHG | HEIGHT: 67 IN | WEIGHT: 233.94 LBS | BODY MASS INDEX: 36.72 KG/M2 | BODY MASS INDEX: 36.72 KG/M2 | SYSTOLIC BLOOD PRESSURE: 145 MMHG | DIASTOLIC BLOOD PRESSURE: 83 MMHG | HEART RATE: 72 BPM | WEIGHT: 233.94 LBS

## 2024-10-31 VITALS
DIASTOLIC BLOOD PRESSURE: 83 MMHG | BODY MASS INDEX: 36.73 KG/M2 | SYSTOLIC BLOOD PRESSURE: 145 MMHG | HEIGHT: 67 IN | HEART RATE: 72 BPM | WEIGHT: 234 LBS

## 2024-10-31 DIAGNOSIS — Z79.811 LONG TERM (CURRENT) USE OF AROMATASE INHIBITORS: ICD-10-CM

## 2024-10-31 DIAGNOSIS — Z17.0 MALIGNANT NEOPLASM OF UPPER-OUTER QUADRANT OF RIGHT BREAST IN FEMALE, ESTROGEN RECEPTOR POSITIVE: Primary | ICD-10-CM

## 2024-10-31 DIAGNOSIS — C50.411 MALIGNANT NEOPLASM OF UPPER-OUTER QUADRANT OF RIGHT BREAST IN FEMALE, ESTROGEN RECEPTOR POSITIVE: Primary | ICD-10-CM

## 2024-10-31 DIAGNOSIS — E66.01 SEVERE OBESITY (BMI 35.0-39.9) WITH COMORBIDITY: ICD-10-CM

## 2024-10-31 DIAGNOSIS — Z12.31 SCREENING MAMMOGRAM FOR BREAST CANCER: ICD-10-CM

## 2024-10-31 PROCEDURE — 99999 PR PBB SHADOW E&M-EST. PATIENT-LVL III: CPT | Mod: PBBFAC,,, | Performed by: RADIOLOGY

## 2024-10-31 PROCEDURE — 99213 OFFICE O/P EST LOW 20 MIN: CPT | Mod: PBBFAC,27 | Performed by: STUDENT IN AN ORGANIZED HEALTH CARE EDUCATION/TRAINING PROGRAM

## 2024-10-31 PROCEDURE — 99999 PR PBB SHADOW E&M-EST. PATIENT-LVL III: CPT | Mod: PBBFAC,,, | Performed by: STUDENT IN AN ORGANIZED HEALTH CARE EDUCATION/TRAINING PROGRAM

## 2024-10-31 PROCEDURE — 99213 OFFICE O/P EST LOW 20 MIN: CPT | Mod: PBBFAC,27 | Performed by: SURGERY

## 2024-10-31 PROCEDURE — 99999 PR PBB SHADOW E&M-EST. PATIENT-LVL III: CPT | Mod: PBBFAC,,, | Performed by: SURGERY

## 2024-10-31 PROCEDURE — 99024 POSTOP FOLLOW-UP VISIT: CPT | Mod: POP,,, | Performed by: SURGERY

## 2024-10-31 PROCEDURE — 99213 OFFICE O/P EST LOW 20 MIN: CPT | Mod: PBBFAC | Performed by: RADIOLOGY

## 2024-10-31 RX ORDER — ANASTROZOLE 1 MG/1
1 TABLET ORAL DAILY
Qty: 90 TABLET | Refills: 3 | Status: SHIPPED | OUTPATIENT
Start: 2024-10-31 | End: 2025-10-31

## 2024-10-31 NOTE — PROGRESS NOTES
Post-Op  [facility]  Department of Surgery    REFERRING PROVIDER:  Bruce Artis MD  MEDICAL ONCOLOGIST:   Dr. Eduardo  RADIATION ONCOLOGIST:  Dr. Kate    DIAGNOSIS:    This is a 75 y.o. female with a stage pT1c N0 M0 grade 1 ER + NE + HER2 - ILC of the right breast as well as stage T1b N0 Grade 2 IDC of the left breast.     TREATMENT SUMMARY:  The patient is status post bilateral partial  mastectomies and bilateral  sentinel node biopsy on 10/9/2024.  Final pathology showed 1.5 cm focus of grade 1 ILC as well as ADH/ALH in the right breast. Margins were negative. Negative nodes. Left lumpectomy showed 7 mm focus of grdae 1 IDC DCIS/LCIS with negative margins and negative nodes.     INTERVAL HISTORY:   Beth Cantu comes in for a post-op check.  She denies fever, chills, chest pain or shortness of breath.  Her pain is well controlled.      MEDICATIONS:  Current Outpatient Medications   Medication Sig Dispense Refill    b complex vitamins tablet Take 1 tablet by mouth once daily.      inositol/choline/biofla/vitB,C (LIPO-FLAVONOID ORAL) Take by mouth.      levothyroxine (SYNTHROID) 150 MCG tablet Take 150 mcg by mouth once daily.      loratadine (CLARITIN) 10 mg tablet Take 10 mg by mouth once daily.      multivitamin capsule Take 1 capsule by mouth once daily.      propranoloL (INDERAL LA) 120 MG 24 hr capsule once daily.      sumatriptan (IMITREX) 25 MG Tab       vitamin D (VITAMIN D3) 1000 units Tab Take 1,000 Units by mouth once daily.      acyclovir (ZOVIRAX) 800 MG Tab Take 1 tablet (800 mg total) by mouth 5 (five) times daily. for 5 days 25 tablet 0    ALPRAZolam (XANAX) 0.25 MG tablet Take 1 tablet (0.25 mg total) by mouth nightly as needed for Insomnia. 30 tablet 4    apixaban (ELIQUIS) 5 mg Tab Take 1 tablet (5 mg total) by mouth 2 (two) times daily. 180 tablet 2    azelastine (ASTELIN) 137 mcg (0.1 %) nasal spray 2 sprays 2 (two) times daily.      letrozole (FEMARA) 2.5 mg Tab Take  1 tablet (2.5 mg total) by mouth once daily. 90 tablet 3    ondansetron (ZOFRAN-ODT) 8 MG TbDL dissolve 1 tablet (8 mg total) by mouth every 8 (eight) hours as needed (Nausea). 20 tablet 1     No current facility-administered medications for this visit.       ALLERGIES:   Review of patient's allergies indicates:   Allergen Reactions    Penicillin Hives and Rash     Other reaction(s): in high school, Skin Rashes/Hives       PHYSICAL EXAMINATION:   General:  This is a well appearing female with appropriate speech, affect and gait.     Breast:  Incision clean, dry, and intact      IMPRESSION:   The patient has had an uneventful postoperative course.    PLAN:   1. return in 6 months for a follow up office visit and breast exam with ANDREAS.   2. bilateral mammogram in 6 months  3. The patient is advised in continued exam of the breast chest wall and to report to this office sooner should she note any areas of abnormality or concern.   4.  She has been instructed to meet with med onc and rad onc for discussion of adjuvant treatment recommendations  5. MRI in 1 year.

## 2024-11-11 ENCOUNTER — HOSPITAL ENCOUNTER (OUTPATIENT)
Dept: RADIATION THERAPY | Facility: HOSPITAL | Age: 74
Discharge: HOME | End: 2024-11-11
Attending: SURGERY
Payer: MEDICARE

## 2024-11-11 PROCEDURE — 77263 THER RADIOLOGY TX PLNG CPLX: CPT | Mod: ,,, | Performed by: RADIOLOGY

## 2024-11-11 PROCEDURE — 77334 RADIATION TREATMENT AID(S): CPT | Mod: 26,,, | Performed by: RADIOLOGY

## 2024-11-11 PROCEDURE — 77014 PR  CT GUIDANCE PLACEMENT RAD THERAPY FIELDS: CPT | Mod: 26,,, | Performed by: RADIOLOGY

## 2024-11-11 PROCEDURE — 77334 RADIATION TREATMENT AID(S): CPT | Mod: TC | Performed by: RADIOLOGY

## 2024-11-11 PROCEDURE — 77014 HC CT GUIDANCE RADIATION THERAPY FLDS PLACEMENT: CPT | Mod: TC | Performed by: RADIOLOGY

## 2024-12-02 ENCOUNTER — HOSPITAL ENCOUNTER (OUTPATIENT)
Dept: RADIATION THERAPY | Facility: HOSPITAL | Age: 74
Discharge: HOME | End: 2024-12-02
Attending: RADIOLOGY
Payer: MEDICARE

## 2024-12-18 PROBLEM — M16.12 PRIMARY OSTEOARTHRITIS OF LEFT HIP: Status: ACTIVE | Noted: 2024-12-18

## 2024-12-18 PROBLEM — I70.0 AORTIC ATHEROSCLEROSIS: Status: ACTIVE | Noted: 2024-12-18

## 2025-01-02 ENCOUNTER — APPOINTMENT (OUTPATIENT)
Dept: RADIOLOGY | Facility: CLINIC | Age: 75
End: 2025-01-02
Attending: STUDENT IN AN ORGANIZED HEALTH CARE EDUCATION/TRAINING PROGRAM
Payer: MEDICARE

## 2025-01-02 DIAGNOSIS — C50.411 MALIGNANT NEOPLASM OF UPPER-OUTER QUADRANT OF RIGHT BREAST IN FEMALE, ESTROGEN RECEPTOR POSITIVE: ICD-10-CM

## 2025-01-02 DIAGNOSIS — Z79.811 LONG TERM (CURRENT) USE OF AROMATASE INHIBITORS: ICD-10-CM

## 2025-01-02 DIAGNOSIS — Z17.0 MALIGNANT NEOPLASM OF UPPER-OUTER QUADRANT OF RIGHT BREAST IN FEMALE, ESTROGEN RECEPTOR POSITIVE: ICD-10-CM

## 2025-01-02 PROCEDURE — 77080 DXA BONE DENSITY AXIAL: CPT | Mod: TC,PO

## 2025-01-02 PROCEDURE — 77080 DXA BONE DENSITY AXIAL: CPT | Mod: 26,,, | Performed by: INTERNAL MEDICINE

## 2025-01-16 ENCOUNTER — PATIENT MESSAGE (OUTPATIENT)
Dept: HEMATOLOGY/ONCOLOGY | Facility: CLINIC | Age: 75
End: 2025-01-16
Payer: MEDICARE

## 2025-02-03 ENCOUNTER — HOSPITAL ENCOUNTER (OUTPATIENT)
Dept: PREADMISSION TESTING | Facility: OTHER | Age: 75
Discharge: HOME | End: 2025-02-03
Attending: ORTHOPAEDIC SURGERY
Payer: MEDICARE

## 2025-02-03 ENCOUNTER — ANESTHESIA EVENT (OUTPATIENT)
Dept: SURGERY | Facility: OTHER | Age: 75
End: 2025-02-03
Payer: MEDICARE

## 2025-02-03 VITALS — HEIGHT: 67 IN | WEIGHT: 238 LBS | BODY MASS INDEX: 37.35 KG/M2

## 2025-02-03 RX ORDER — SODIUM CHLORIDE, SODIUM LACTATE, POTASSIUM CHLORIDE, CALCIUM CHLORIDE 600; 310; 30; 20 MG/100ML; MG/100ML; MG/100ML; MG/100ML
INJECTION, SOLUTION INTRAVENOUS CONTINUOUS
Status: CANCELLED | OUTPATIENT
Start: 2025-02-03

## 2025-02-03 RX ORDER — LIDOCAINE HYDROCHLORIDE 10 MG/ML
0.5 INJECTION, SOLUTION EPIDURAL; INFILTRATION; INTRACAUDAL; PERINEURAL ONCE
Status: CANCELLED | OUTPATIENT
Start: 2025-02-03 | End: 2025-02-03

## 2025-02-03 RX ORDER — ACETAMINOPHEN 500 MG
1000 TABLET ORAL
Status: CANCELLED | OUTPATIENT
Start: 2025-02-03 | End: 2025-02-03

## 2025-02-03 NOTE — DISCHARGE INSTRUCTIONS
Information to Prepare you for your Surgery    PRE-ADMIT TESTING -  [phone]    [address]  [facility]          Your surgery has been scheduled at [facility]. We are pleased to have the opportunity to serve you. For Further Information please call [phone].    On the day of surgery please report to the Information Desk on the 1st floor.    CONTACT YOUR PHYSICIAN'S OFFICE THE DAY PRIOR TO YOUR SURGERY TO OBTAIN YOUR ARRIVAL TIME.     The evening before surgery do not eat anything after 9 p.m. ( this includes hard candy, chewing gum and mints).  You may only have GATORADE, POWERADE AND WATER  from 9 p.m. until you leave your home.   DO NOT DRINK ANY LIQUIDS ON THE WAY TO THE HOSPITAL.      Why does your anesthesiologist allow you to drink Gatorade/Powerade before surgery?  Gatorade/Powerade helps to increase your comfort before surgery and to decrease your nausea after surgery. The carbohydrates in Gatorade/Powerade help reduce your body's stress response to surgery.  If you are a diabetic-drink only water prior to surgery.    Outpatient Surgery- May allow 2 adult (18 and older) Support Persons (1 being the designated ) for all surgical/procedural patients. A breastfeeding mother will be allowed her infant and 2 adult Support Persons. No one under the age of 18 will be allowed in the building. No swapping out of visitors in the [facility].      SPECIAL MEDICATION INSTRUCTIONS: TAKE medications checked off by the Anesthesiologist on your Medication List.    Angiogram Patients: Take medications as instructed by your physician, including aspirin.     Surgery Patients:    If you take ASPIRIN - Your PHYSICIAN/SURGEON will need to inform you IF/OR when you need to stop taking aspirin prior to your surgery.     The week prior to surgery do not ot take any medications containing IBUPROFEN or NSAIDS ( Advil, Motrin, Goodys, BC, Aleve, Naproxen etc)  If you are not sure if you should take a medicine please call your surgeon's office.  Ok to take Tylenol    Do Not Wear any make-up (especially eye make-up) to surgery. Please remove any false eyelashes or eyelash extensions. If you arrive the day of surgery with makeup/eyelashes on you will be required to remove prior to surgery. (There is a risk of corneal abrasions if eye makeup/eyelash extensions are not removed)      Leave all valuables at home.   Do Not wear any jewelry or watches, including any metal in body piercings. Jewelry must be removed prior to coming to the hospital.  There is a possibility that rings that are unable to be removed may be cut off if they are on the surgical extremity.    Please remove all hair extensions, wigs, clips and any other metal accessories/ ornaments from your hair.  These items may pose a flammable/fire risk in Surgery and must be removed.    Do not shave your surgical area at least 5 days prior to your surgery. The surgical prep will be performed at the hospital according to Infection Control regulations.    Contact Lens must be removed before surgery. Either do not wear the contact lens or bring a case and solution for storage.  Please bring a container for eyeglasses or dentures as required.  Bring any paperwork your physician has provided, such as consent forms,  history and physicals, doctor's orders, etc.   Bring comfortable clothes that are loose fitting to wear upon discharge. Take into consideration the type of surgery being performed.  Maintain your diet as advised per your physician the day prior to surgery.      Adequate rest the night before surgery is advised.   Park in the Parking lot behind the hospital or in the [facility] across the street from the parking lot. Parking is complimentary.  If you will be discharged the same day as your procedure, please arrange for a responsible adult to drive you home or to accompany you if traveling by taxi.    YOU WILL NOT BE PERMITTED TO DRIVE OR TO LEAVE THE HOSPITAL ALONE AFTER SURGERY.   If you are being discharged the same day, it is strongly recommended that you arrange for someone to remain with you for the first 24 hrs following your surgery.    The Surgeon will speak to your family/visitor after your surgery regarding the outcome of your surgery and post op care.  The Surgeon may speak to you after your surgery, but there is a possibility you may not remember the details.  Please check with your family members regarding the conversation with the Surgeon.    We strongly recommend whoever is bringing you home be present for discharge instructions.  This will ensure a thorough understanding for your post op home care.    If the patient has fever, cough, or signs/symptoms of Flu or Covid please do not come in for your surgery. Contact your surgeon and your primary care physician for further instructions.           Thank you for your cooperation.  The Staff of [facility].            Bathing Instructions with Hibiclens    Shower the evening before and morning of your procedure with Chlorhexidine (Hibiclens)  do not use Chlorhexidine on your face or genitals. Do not get in your eyes.  Wash your face with water and your regular face wash/soap  Use your regular shampoo  Apply Chlorhexidine (Hibiclens) directly on your skin or on a wet washcloth and wash gently. When showering: Move away from the shower stream when applying Chlorhexidine (Hibiclens) to avoid rinsing off too soon.  Rinse thoroughly with warm water  Do not dilute Chlorhexidine (Hibiclens)   Dry off as usual, do not use any deodorant, powder, body lotions, perfume, after shave or cologne.

## 2025-02-03 NOTE — ANESTHESIA PREPROCEDURE EVALUATION
02/03/2025  Beth Cantu is a 74 y.o., female.      Pre-op Assessment    I have reviewed the Patient Summary Reports.     I have reviewed the Nursing Notes. I have reviewed the NPO Status.   I have reviewed the Medications.     Review of Systems  Anesthesia Hx:  No previous Anesthesia   History of prior surgery of interest to airway management or planning:  Previous anesthesia: Spinal   2021 [facility] Dr Fonseca with spinal.  Procedure performed at an [facility].      Denies Family Hx of Anesthesia complications.    Denies Personal Hx of Anesthesia complications.                    Social:  Non-Smoker       Hematology/Oncology:  Hematology Normal                       --  Cancer in past history:       Breast    right          EENT/Dental:  EENT/Dental Normal           Cardiovascular:      Valvular problems/Murmurs, MVP             ECG has been reviewed. NSR                           Pulmonary:  Pulmonary Normal                       Renal/:  Renal/ Normal                 Hepatic/GI:  Hepatic/GI Normal                    Musculoskeletal:  Arthritis               Neurological:      Headaches                                 Endocrine:   Hypothyroidism        Obesity / BMI > 30  Dermatological:  Skin Normal    Psych:  Psychiatric Normal                  Physical Exam  General: Cooperative, Alert and Oriented    Airway:  Mallampati: II   Mouth Opening: Normal  Neck ROM: Normal ROM    Dental:  Intact    Anesthesia Plan  Type of Anesthesia, risks & benefits discussed:    Anesthesia Type: Spinal  Intra-op Monitoring Plan: Standard ASA Monitors  Post Op Pain Control Plan: multimodal analgesia  Informed Consent: Informed consent signed with the Patient and all parties understand the risks and agree with anesthesia plan.  All questions answered.   ASA Score: 2  Anesthesia Plan Notes: Plan spinal  Int med  clearance in [facility]  Labs on paper    Ready For Surgery From Anesthesia Perspective.     .

## 2025-02-05 NOTE — DISCHARGE INSTRUCTIONS
Hip Replacement Discharge Instructions     1. Pain:  After surgery you may feel some pain in the operative leg groin area. This is normal. Your hip will likely have been injected with a numbing medicine (Exparel) prior to completion of surgery for pain control. This is indicated on a green bracelet that you will wear for 4 days after surgery. You will also get a prescription for pain control before you leave the hospital. Be aware of you ingestion of Tylenol (acetaminophen). Do not exceed 3000 mg per day, as this can damage your liver.  Elevate your leg when sitting for comfort  2. Incision Care:  Some drainage from the incision in the first 72 hours is normal. If drainage is excessive, do not go the ER. Reinforce bandage with ABD pad or gauze and secure with tape. Notify M.D. for excessive drainage. Call [phone]  Staples will be removed 14-21 days after surgery. If your incision is closed with steri-strips, do not remove.  3. Activity:  See attached hip precautions and follow for 6 weeks (instructions found at the end of packet):  Perform exercises 3 times a day.  Use a hard, flat surface, such as a firm mattress, when exercising.  You may shower 2 days after surgery provided the dressing is waterproof. Support/help is mandatory during showering. If dressing becomes wet, replace with a new dressing. No bathing or swimming for 6 weeks or until incision is completely healed.  Wear jermaine hose for 3 weeks after surgery. You may remove for 1-2 hours during the day only. Send patient home with an extra pair. Can be washed on gentle cycle, do not place in dryer.  4. Safety:  Add cushions to low chairs and car seats for elevation.  When getting in and out of a car, it is important to keep your leg straight and out to the side. Wear a seatbelt at all times.  You will be given a raised toilet seat (3-1 commode).  Use a walker, cane, or crutches as long as M.D. recommends.  If you are discharged on Aspirin 81 mg twice a  day, please administer for 1 month. If you are sent home on Eliquis follow your physician's orders.       6. Possible Complications: Report to Surgeon  Infection  Unexpected redness  Persistent drainage  Temperature greater than 101°F  Additional swelling  Pain not controlled with current pain medicine  Blood clot  Unusual pain  Red or discolored skin  Swelling  Unusual warm skin  Dislocation  Severe hip pain especially when moved  The injured leg is shorter than the uninjured leg  The injured leg lies in an abnormal position. In most cases the leg is bent at the hip, turned inward and pulled toward the middle of the body.    6. You may need antibiotic coverage before dental or minor surgical procedures.          Your Surgeon Gave You EXPAREL® (bupivacaine liposome injectable suspension)    To help control your pain after surgery, your surgeon injected EXPAREL into your surgical incision just before the end of the procedure.   EXPAREL is a local analgesic that contains the local anesthetic bupivacaine. Local anesthetics provide pain relief by numbing the tissue  around the surgical site.   EXPAREL is specifically designed to release pain medication over time and can control pain for up to 72 hours.   In addition to EXPAREL, your surgeon may provide other pain medications to control your pain.   Each patient is different and responds differently to painmedication. Depending on how you respond to EXPAREL, you may require less  additional pain medication during your recovery.    Possible Side Effects    Side effects can occur with any medication and it is important not to ignore anything you may be experiencing. Some patients who  received EXPAREL experienced nausea, vomiting, or constipation. Rarely, patients who receive bupivacaine (the active ingredient in  EXPAREL) have experienced numbness and tingling in their mouth or lips, lightheadedness, or anxiety. Speak with your doctor right  away if you think you may be  experiencing any of these sensations, or if you have other questions regarding possible side effects.    Your Recovery    When your pain is under control, your body can better focus on healing. This is not the time to test your pain tolerance, or grin and  bear it.Work with your surgeon and nurse to make your recovery as speedy and pain-free as possible.   Follow the post-op orders your nurse gave you.   Eat a healthy diet and drink plenty of water. Surgery stresses your body; your body responds by needing more energy to heal.      Important Information About EXPAREL  Products that contain bupivacaine, like EXPAREL, may cause a temporary loss of  sensation or the ability to move in the area where bupivacaine was injected.    Date Administered: 2/10/2025  Time Administered: 1240    Other Forms of Bupivicaine should not be administered within 96hrs following administration of EXPAREL.

## 2025-02-07 NOTE — PLAN OF CARE
Case Management Assessment     PCP: Bruce Artis MD  Pharmacy: bedside    Patient Arrived From: Home  Existing Help at Home: Spouse     Barriers to Discharge: None    Discharge Plan:    A. Home with HH    B. Home with HH        I provided the patient a choice of post acute providers and offered a list of CMS rated HH.   Patient has declined to select a preferred provider and elects placement with the first accepting in network provider that is available to provide services as ordered by the physician.     [facility] - Surgery ([place])  Initial Discharge Assessment       Primary Care Provider: Bruce Artis MD    Admission Diagnosis: Unilateral osteoarthritis of hip, left [M16.12]    Admission Date: (Not on file)  Expected Discharge Date:     Transition of Care Barriers: (P) None    Payor: MEDICARE / Plan: MEDICARE PART A & B / Product Type: Government /     Extended Emergency Contact Information  Primary Emergency Contact: AlondraKlaus  Address: [address] [place] [address] [id number] [place] [address]  Home Phone: [phone]  Mobile Phone: [phone]  Relation: Spouse  Secondary Emergency Contact: Luis Cantu  Mobile Phone: [phone]  Relation: Son    Discharge Plan A: (P) Home Health  Discharge Plan B: (P) Home Health      [facility] [address]  [address] [facility] [address]  Phone: [phone] Fax: [phone]    [facility] [address] [facility] [address] [facility]  [address] [facility] [address] [facility] [address]  Phone: [phone] Fax: [phone]    [facility]  [address] [place] [address] [address]  [facility] [address]  Phone: [phone] Fax: [phone]      Initial Assessment (most recent)       Adult Discharge Assessment - 02/07/25 1442          Discharge Assessment    Assessment Type Discharge Planning Assessment (P)      Confirmed/corrected address, phone number and insurance Yes (P)      Confirmed Demographics  Correct on Facesheet (P)      Source of Information patient;health record (P)      People in Home spouse (P)      Do you expect to return to your current living situation? Yes (P)      Do you have help at home or someone to help you manage your care at home? Yes (P)      Prior to hospitilization cognitive status: Alert/Oriented;No Deficits (P)      Current cognitive status: Alert/Oriented;No Deficits (P)      Equipment Currently Used at Home walker, rolling;bedside commode (P)      Readmission within 30 days? No (P)      Patient currently being followed by outpatient case management? No (P)      Do you currently have service(s) that help you manage your care at home? No (P)      Do you take prescription medications? Yes (P)      Do you have prescription coverage? Yes (P)      Do you have any problems affording any of your prescribed medications? No (P)      Is the patient taking medications as prescribed? yes (P)      How do you get to doctors appointments? family or friend will provide;car, drives self (P)      Are you on dialysis? No (P)      Do you take coumadin? No (P)      Discharge Plan A Home Health (P)      Discharge Plan B Home Health (P)      DME Needed Upon Discharge  none (P)      Discharge Plan discussed with: Patient (P)      Transition of Care Barriers None (P)

## 2025-02-10 ENCOUNTER — ANESTHESIA (OUTPATIENT)
Dept: SURGERY | Facility: OTHER | Age: 75
End: 2025-02-10
Payer: MEDICARE

## 2025-02-10 ENCOUNTER — HOSPITAL ENCOUNTER (OUTPATIENT)
Facility: OTHER | Age: 75
Discharge: HOME HEALTH | End: 2025-02-10
Attending: ORTHOPAEDIC SURGERY | Admitting: ORTHOPAEDIC SURGERY
Payer: MEDICARE

## 2025-02-10 VITALS
DIASTOLIC BLOOD PRESSURE: 74 MMHG | WEIGHT: 238 LBS | SYSTOLIC BLOOD PRESSURE: 131 MMHG | RESPIRATION RATE: 16 BRPM | BODY MASS INDEX: 37.35 KG/M2 | TEMPERATURE: 96 F | HEIGHT: 67 IN | HEART RATE: 71 BPM | OXYGEN SATURATION: 95 %

## 2025-02-10 DIAGNOSIS — M16.12 UNILATERAL PRIMARY OSTEOARTHRITIS, LEFT HIP: ICD-10-CM

## 2025-02-10 DIAGNOSIS — M16.12 UNILATERAL OSTEOARTHRITIS OF HIP, LEFT: Primary | ICD-10-CM

## 2025-02-10 PROCEDURE — 36000710: Performed by: ORTHOPAEDIC SURGERY

## 2025-02-10 PROCEDURE — C1713 ANCHOR/SCREW BN/BN,TIS/BN: HCPCS | Performed by: ORTHOPAEDIC SURGERY

## 2025-02-10 PROCEDURE — 37000008 HC ANESTHESIA 1ST 15 MINUTES: Performed by: ORTHOPAEDIC SURGERY

## 2025-02-10 PROCEDURE — 63600175 PHARM REV CODE 636 W HCPCS: Performed by: NURSE ANESTHETIST, CERTIFIED REGISTERED

## 2025-02-10 PROCEDURE — 63600175 PHARM REV CODE 636 W HCPCS: Mod: JZ,TB | Performed by: ORTHOPAEDIC SURGERY

## 2025-02-10 PROCEDURE — D9220A PRA ANESTHESIA: Mod: CRNA,,, | Performed by: NURSE ANESTHETIST, CERTIFIED REGISTERED

## 2025-02-10 PROCEDURE — 97535 SELF CARE MNGMENT TRAINING: CPT

## 2025-02-10 PROCEDURE — D9220A PRA ANESTHESIA: Mod: ANES,,, | Performed by: ANESTHESIOLOGY

## 2025-02-10 PROCEDURE — 71000015 HC POSTOP RECOV 1ST HR: Performed by: ORTHOPAEDIC SURGERY

## 2025-02-10 PROCEDURE — 27201423 OPTIME MED/SURG SUP & DEVICES STERILE SUPPLY: Performed by: ORTHOPAEDIC SURGERY

## 2025-02-10 PROCEDURE — 97161 PT EVAL LOW COMPLEX 20 MIN: CPT

## 2025-02-10 PROCEDURE — 37000009 HC ANESTHESIA EA ADD 15 MINS: Performed by: ORTHOPAEDIC SURGERY

## 2025-02-10 PROCEDURE — 25000003 PHARM REV CODE 250: Performed by: ORTHOPAEDIC SURGERY

## 2025-02-10 PROCEDURE — 97530 THERAPEUTIC ACTIVITIES: CPT

## 2025-02-10 PROCEDURE — 25000003 PHARM REV CODE 250: Performed by: ANESTHESIOLOGY

## 2025-02-10 PROCEDURE — 25000003 PHARM REV CODE 250

## 2025-02-10 PROCEDURE — 63600175 PHARM REV CODE 636 W HCPCS: Performed by: ANESTHESIOLOGY

## 2025-02-10 PROCEDURE — 71000016 HC POSTOP RECOV ADDL HR: Performed by: ORTHOPAEDIC SURGERY

## 2025-02-10 PROCEDURE — C1776 JOINT DEVICE (IMPLANTABLE): HCPCS | Performed by: ORTHOPAEDIC SURGERY

## 2025-02-10 PROCEDURE — 71000039 HC RECOVERY, EACH ADD'L HOUR: Performed by: ORTHOPAEDIC SURGERY

## 2025-02-10 PROCEDURE — 97165 OT EVAL LOW COMPLEX 30 MIN: CPT

## 2025-02-10 PROCEDURE — 71000033 HC RECOVERY, INTIAL HOUR: Performed by: ORTHOPAEDIC SURGERY

## 2025-02-10 PROCEDURE — 36000711: Performed by: ORTHOPAEDIC SURGERY

## 2025-02-10 PROCEDURE — 63600175 PHARM REV CODE 636 W HCPCS

## 2025-02-10 DEVICE — IMPLANTABLE DEVICE: Type: IMPLANTABLE DEVICE | Site: HIP | Status: FUNCTIONAL

## 2025-02-10 DEVICE — IMPLANTABLE DEVICE
Type: IMPLANTABLE DEVICE | Site: HIP | Status: FUNCTIONAL
Brand: VERSYS®

## 2025-02-10 DEVICE — IMPLANTABLE DEVICE
Type: IMPLANTABLE DEVICE | Site: HIP | Status: FUNCTIONAL
Brand: TRILOGY® LONGEVITY®

## 2025-02-10 DEVICE — HEAD FEMORAL BIOLOX 36MM 0MM: Type: IMPLANTABLE DEVICE | Site: HIP | Status: FUNCTIONAL

## 2025-02-10 DEVICE — IMPLANTABLE DEVICE
Type: IMPLANTABLE DEVICE | Site: HIP | Status: FUNCTIONAL
Brand: TRILOGY®

## 2025-02-10 DEVICE — IMPLANTABLE DEVICE
Type: IMPLANTABLE DEVICE | Site: HIP | Status: FUNCTIONAL
Brand: REFOBACIN® BONE CEMENT R

## 2025-02-10 RX ORDER — MUPIROCIN 20 MG/G
OINTMENT TOPICAL 2 TIMES DAILY
Status: CANCELLED | OUTPATIENT
Start: 2025-02-10 | End: 2025-02-15

## 2025-02-10 RX ORDER — OXYCODONE AND ACETAMINOPHEN 5; 325 MG/1; MG/1
TABLET ORAL
Qty: 60 TABLET | Refills: 0 | Status: SHIPPED | OUTPATIENT
Start: 2025-02-10

## 2025-02-10 RX ORDER — PROPRANOLOL HYDROCHLORIDE 40 MG/1
40 TABLET ORAL 2 TIMES DAILY
OUTPATIENT
Start: 2025-02-11

## 2025-02-10 RX ORDER — NAPROXEN SODIUM 220 MG/1
81 TABLET, FILM COATED ORAL 2 TIMES DAILY
Status: CANCELLED | OUTPATIENT
Start: 2025-02-11

## 2025-02-10 RX ORDER — ONDANSETRON 8 MG/1
8 TABLET, ORALLY DISINTEGRATING ORAL EVERY 8 HOURS PRN
Qty: 20 TABLET | Refills: 1 | Status: SHIPPED | OUTPATIENT
Start: 2025-02-10

## 2025-02-10 RX ORDER — LEVOTHYROXINE SODIUM 75 UG/1
150 TABLET ORAL DAILY
Status: CANCELLED | OUTPATIENT
Start: 2025-02-10

## 2025-02-10 RX ORDER — DEXTROSE MONOHYDRATE AND SODIUM CHLORIDE 5; .9 G/100ML; G/100ML
INJECTION, SOLUTION INTRAVENOUS CONTINUOUS
Status: DISCONTINUED | OUTPATIENT
Start: 2025-02-10 | End: 2025-02-10 | Stop reason: HOSPADM

## 2025-02-10 RX ORDER — ONDANSETRON HYDROCHLORIDE 2 MG/ML
INJECTION, SOLUTION INTRAVENOUS
Status: DISCONTINUED | OUTPATIENT
Start: 2025-02-10 | End: 2025-02-10

## 2025-02-10 RX ORDER — OXYCODONE HYDROCHLORIDE 5 MG/1
5 TABLET ORAL EVERY 4 HOURS PRN
Status: CANCELLED | OUTPATIENT
Start: 2025-02-10

## 2025-02-10 RX ORDER — LIDOCAINE HYDROCHLORIDE 10 MG/ML
0.5 INJECTION, SOLUTION EPIDURAL; INFILTRATION; INTRACAUDAL; PERINEURAL ONCE
Status: DISCONTINUED | OUTPATIENT
Start: 2025-02-10 | End: 2025-02-10 | Stop reason: HOSPADM

## 2025-02-10 RX ORDER — PROCHLORPERAZINE EDISYLATE 5 MG/ML
5 INJECTION INTRAMUSCULAR; INTRAVENOUS EVERY 30 MIN PRN
Status: DISCONTINUED | OUTPATIENT
Start: 2025-02-10 | End: 2025-02-10

## 2025-02-10 RX ORDER — PROPOFOL 10 MG/ML
VIAL (ML) INTRAVENOUS CONTINUOUS PRN
Status: DISCONTINUED | OUTPATIENT
Start: 2025-02-10 | End: 2025-02-10

## 2025-02-10 RX ORDER — ALPRAZOLAM 0.25 MG/1
0.25 TABLET ORAL NIGHTLY PRN
Status: CANCELLED | OUTPATIENT
Start: 2025-02-10

## 2025-02-10 RX ORDER — SODIUM CHLORIDE 0.9 % (FLUSH) 0.9 %
2 SYRINGE (ML) INJECTION EVERY 6 HOURS PRN
Status: CANCELLED | OUTPATIENT
Start: 2025-02-10

## 2025-02-10 RX ORDER — ONDANSETRON HYDROCHLORIDE 2 MG/ML
8 INJECTION, SOLUTION INTRAVENOUS EVERY 8 HOURS PRN
Status: CANCELLED | OUTPATIENT
Start: 2025-02-10

## 2025-02-10 RX ORDER — ACETAMINOPHEN 500 MG
1000 TABLET ORAL
Status: COMPLETED | OUTPATIENT
Start: 2025-02-10 | End: 2025-02-10

## 2025-02-10 RX ORDER — KETOROLAC TROMETHAMINE 30 MG/ML
INJECTION, SOLUTION INTRAMUSCULAR; INTRAVENOUS
Status: DISCONTINUED | OUTPATIENT
Start: 2025-02-10 | End: 2025-02-10 | Stop reason: HOSPADM

## 2025-02-10 RX ORDER — HYDROMORPHONE HYDROCHLORIDE 2 MG/ML
0.5 INJECTION, SOLUTION INTRAMUSCULAR; INTRAVENOUS; SUBCUTANEOUS EVERY 4 HOURS PRN
Status: CANCELLED | OUTPATIENT
Start: 2025-02-10 | End: 2025-02-11

## 2025-02-10 RX ORDER — LIDOCAINE HYDROCHLORIDE 20 MG/ML
INJECTION INTRAVENOUS
Status: DISCONTINUED | OUTPATIENT
Start: 2025-02-10 | End: 2025-02-10

## 2025-02-10 RX ORDER — CEFAZOLIN SODIUM 1 G/3ML
INJECTION, POWDER, FOR SOLUTION INTRAMUSCULAR; INTRAVENOUS
Status: DISCONTINUED | OUTPATIENT
Start: 2025-02-10 | End: 2025-02-10

## 2025-02-10 RX ORDER — SODIUM CHLORIDE, SODIUM LACTATE, POTASSIUM CHLORIDE, CALCIUM CHLORIDE 600; 310; 30; 20 MG/100ML; MG/100ML; MG/100ML; MG/100ML
INJECTION, SOLUTION INTRAVENOUS CONTINUOUS
Status: DISCONTINUED | OUTPATIENT
Start: 2025-02-10 | End: 2025-02-10 | Stop reason: HOSPADM

## 2025-02-10 RX ORDER — BISACODYL 10 MG/1
10 SUPPOSITORY RECTAL DAILY PRN
Status: CANCELLED | OUTPATIENT
Start: 2025-02-10

## 2025-02-10 RX ORDER — ACETAMINOPHEN 500 MG
1000 TABLET ORAL EVERY 8 HOURS
Status: CANCELLED | OUTPATIENT
Start: 2025-02-10 | End: 2025-02-11

## 2025-02-10 RX ORDER — CELECOXIB 200 MG/1
200 CAPSULE ORAL 2 TIMES DAILY
Status: CANCELLED | OUTPATIENT
Start: 2025-02-10

## 2025-02-10 RX ORDER — BUPIVACAINE HYDROCHLORIDE AND EPINEPHRINE 2.5; 5 MG/ML; UG/ML
INJECTION, SOLUTION EPIDURAL; INFILTRATION; INTRACAUDAL; PERINEURAL
Status: DISCONTINUED | OUTPATIENT
Start: 2025-02-10 | End: 2025-02-10 | Stop reason: HOSPADM

## 2025-02-10 RX ORDER — ROPIVACAINE HYDROCHLORIDE 5 MG/ML
INJECTION, SOLUTION EPIDURAL; INFILTRATION; PERINEURAL
Status: COMPLETED | OUTPATIENT
Start: 2025-02-10 | End: 2025-02-10

## 2025-02-10 RX ORDER — AZELASTINE 1 MG/ML
2 SPRAY, METERED NASAL 2 TIMES DAILY
COMMUNITY
Start: 2024-12-30

## 2025-02-10 RX ORDER — TRANEXAMIC ACID 100 MG/ML
INJECTION, SOLUTION INTRAVENOUS
Status: DISCONTINUED | OUTPATIENT
Start: 2025-02-10 | End: 2025-02-10

## 2025-02-10 RX ORDER — DOCUSATE SODIUM 100 MG/1
100 CAPSULE, LIQUID FILLED ORAL EVERY 12 HOURS
Status: CANCELLED | OUTPATIENT
Start: 2025-02-10

## 2025-02-10 RX ORDER — PROPRANOLOL HYDROCHLORIDE 40 MG/1
40 TABLET ORAL 2 TIMES DAILY
Status: DISCONTINUED | OUTPATIENT
Start: 2025-02-10 | End: 2025-02-10 | Stop reason: HOSPADM

## 2025-02-10 RX ORDER — HYDROMORPHONE HYDROCHLORIDE 2 MG/ML
0.4 INJECTION, SOLUTION INTRAMUSCULAR; INTRAVENOUS; SUBCUTANEOUS EVERY 5 MIN PRN
Status: DISCONTINUED | OUTPATIENT
Start: 2025-02-10 | End: 2025-02-10

## 2025-02-10 RX ORDER — ASPIRIN 81 MG/1
81 TABLET ORAL 2 TIMES DAILY
Qty: 60 TABLET | Refills: 0 | Status: SHIPPED | OUTPATIENT
Start: 2025-02-10

## 2025-02-10 RX ORDER — GLUCAGON 1 MG
1 KIT INJECTION
Status: DISCONTINUED | OUTPATIENT
Start: 2025-02-10 | End: 2025-02-10

## 2025-02-10 RX ORDER — OXYCODONE HYDROCHLORIDE 5 MG/1
10 TABLET ORAL EVERY 4 HOURS PRN
Status: CANCELLED | OUTPATIENT
Start: 2025-02-10

## 2025-02-10 RX ORDER — CEFAZOLIN 2 G/1
2 INJECTION, POWDER, FOR SOLUTION INTRAMUSCULAR; INTRAVENOUS ONCE
Status: COMPLETED | OUTPATIENT
Start: 2025-02-10 | End: 2025-02-10

## 2025-02-10 RX ORDER — PROPOFOL 10 MG/ML
VIAL (ML) INTRAVENOUS
Status: DISCONTINUED | OUTPATIENT
Start: 2025-02-10 | End: 2025-02-10

## 2025-02-10 RX ORDER — BUPIVACAINE 13.3 MG/ML
INJECTION, SUSPENSION, LIPOSOMAL INFILTRATION
Status: DISCONTINUED | OUTPATIENT
Start: 2025-02-10 | End: 2025-02-10 | Stop reason: HOSPADM

## 2025-02-10 RX ORDER — OXYCODONE HYDROCHLORIDE 5 MG/1
5 TABLET ORAL
Status: DISCONTINUED | OUTPATIENT
Start: 2025-02-10 | End: 2025-02-10

## 2025-02-10 RX ORDER — PHENYLEPHRINE HYDROCHLORIDE 10 MG/ML
INJECTION INTRAVENOUS
Status: DISCONTINUED | OUTPATIENT
Start: 2025-02-10 | End: 2025-02-10

## 2025-02-10 RX ORDER — DEXAMETHASONE SODIUM PHOSPHATE 4 MG/ML
INJECTION, SOLUTION INTRA-ARTICULAR; INTRALESIONAL; INTRAMUSCULAR; INTRAVENOUS; SOFT TISSUE
Status: DISCONTINUED | OUTPATIENT
Start: 2025-02-10 | End: 2025-02-10

## 2025-02-10 RX ORDER — CEFAZOLIN 2 G/1
2 INJECTION, POWDER, FOR SOLUTION INTRAMUSCULAR; INTRAVENOUS
Status: DISCONTINUED | OUTPATIENT
Start: 2025-02-10 | End: 2025-02-10

## 2025-02-10 RX ORDER — METOCLOPRAMIDE HYDROCHLORIDE 5 MG/ML
5 INJECTION INTRAMUSCULAR; INTRAVENOUS EVERY 6 HOURS PRN
Status: CANCELLED | OUTPATIENT
Start: 2025-02-10

## 2025-02-10 RX ORDER — SODIUM CHLORIDE 0.9 % (FLUSH) 0.9 %
3 SYRINGE (ML) INJECTION
Status: DISCONTINUED | OUTPATIENT
Start: 2025-02-10 | End: 2025-02-10

## 2025-02-10 RX ORDER — DIPHENHYDRAMINE HYDROCHLORIDE 50 MG/ML
25 INJECTION INTRAMUSCULAR; INTRAVENOUS EVERY 6 HOURS PRN
Status: CANCELLED | OUTPATIENT
Start: 2025-02-10

## 2025-02-10 RX ORDER — FENTANYL CITRATE 50 UG/ML
INJECTION, SOLUTION INTRAMUSCULAR; INTRAVENOUS
Status: DISCONTINUED | OUTPATIENT
Start: 2025-02-10 | End: 2025-02-10

## 2025-02-10 RX ORDER — FAMOTIDINE 20 MG/1
20 TABLET, FILM COATED ORAL 2 TIMES DAILY
Status: CANCELLED | OUTPATIENT
Start: 2025-02-10

## 2025-02-10 RX ADMIN — DEXTROSE AND SODIUM CHLORIDE: 5; 900 INJECTION, SOLUTION INTRAVENOUS at 02:02

## 2025-02-10 RX ADMIN — ACETAMINOPHEN 1000 MG: 500 TABLET, FILM COATED ORAL at 11:02

## 2025-02-10 RX ADMIN — ROPIVACAINE HYDROCHLORIDE 3 ML: 5 INJECTION, SOLUTION EPIDURAL; INFILTRATION; PERINEURAL at 11:02

## 2025-02-10 RX ADMIN — SODIUM CHLORIDE, SODIUM LACTATE, POTASSIUM CHLORIDE, AND CALCIUM CHLORIDE: .6; .31; .03; .02 INJECTION, SOLUTION INTRAVENOUS at 12:02

## 2025-02-10 RX ADMIN — LIDOCAINE HYDROCHLORIDE 20 MG: 20 INJECTION, SOLUTION INTRAVENOUS at 12:02

## 2025-02-10 RX ADMIN — PHENYLEPHRINE HYDROCHLORIDE 200 MCG: 10 INJECTION INTRAVENOUS at 12:02

## 2025-02-10 RX ADMIN — ONDANSETRON HYDROCHLORIDE 4 MG: 2 INJECTION INTRAMUSCULAR; INTRAVENOUS at 11:02

## 2025-02-10 RX ADMIN — FENTANYL CITRATE 100 MCG: 50 INJECTION, SOLUTION INTRAMUSCULAR; INTRAVENOUS at 11:02

## 2025-02-10 RX ADMIN — PHENYLEPHRINE HYDROCHLORIDE 200 MCG: 10 INJECTION INTRAVENOUS at 01:02

## 2025-02-10 RX ADMIN — CEFAZOLIN 2 G: 330 INJECTION, POWDER, FOR SOLUTION INTRAMUSCULAR; INTRAVENOUS at 12:02

## 2025-02-10 RX ADMIN — DEXAMETHASONE SODIUM PHOSPHATE 4 MG: 4 INJECTION, SOLUTION INTRAMUSCULAR; INTRAVENOUS at 12:02

## 2025-02-10 RX ADMIN — OXYCODONE HYDROCHLORIDE 5 MG: 5 TABLET ORAL at 02:02

## 2025-02-10 RX ADMIN — PROPOFOL 50 MG: 10 INJECTION, EMULSION INTRAVENOUS at 12:02

## 2025-02-10 RX ADMIN — VANCOMYCIN HYDROCHLORIDE 1500 MG: 1.5 INJECTION, POWDER, LYOPHILIZED, FOR SOLUTION INTRAVENOUS at 11:02

## 2025-02-10 RX ADMIN — TRANEXAMIC ACID 2000 MG: 100 INJECTION, SOLUTION INTRAVENOUS at 11:02

## 2025-02-10 RX ADMIN — SODIUM CHLORIDE, SODIUM LACTATE, POTASSIUM CHLORIDE, AND CALCIUM CHLORIDE: .6; .31; .03; .02 INJECTION, SOLUTION INTRAVENOUS at 11:02

## 2025-02-10 RX ADMIN — PROPOFOL 100 MCG/KG/MIN: 10 INJECTION, EMULSION INTRAVENOUS at 12:02

## 2025-02-10 RX ADMIN — CEFAZOLIN 2 G: 2 INJECTION, POWDER, FOR SOLUTION INTRAMUSCULAR; INTRAVENOUS at 05:02

## 2025-02-10 NOTE — CONSULTS
Consult Note  MED    Consult Requested By: Dionicio Fonseca MD  Reason for Consult: Thyroid/Migraine/Obese    SUBJECTIVE:     History of Present Illness:  Patient is a 74 y.o. female presents with scheduled left hip repair.  Seen pre-op in ambulatory.  VSS.  Discussed with pt/.  Pre-op/EPIC reviewed. No CP/SOB/N/V/D/F/C.  .    Past Medical History:   Diagnosis Date    Absence of both cervix and uterus, acquired     TVH    Basal cell carcinoma (BCC)     1990's    Breast cyst     Cancer     right breast    H/O bone density study 07/21/2014    Normal    H/O colonoscopy 2005    Cologard done 10/2016, Negative    H/O mammogram 2015    Normal      Herpes simplex virus (HSV) infection     Hypothyroidism associated with surgical procedure     Migraines     MVP (mitral valve prolapse)     Osteoarthritis     Overweight     Thyroid disease     Vaginal Pap smear 2006    Normal     Past Surgical History:   Procedure Laterality Date    ARTHROPLASTY, KNEE, TOTAL, SIGHT ASSISTED Right 06/28/2021    Procedure: ARTHROPLASTY, KNEE, TOTAL, SIGHT ASSISTED;  Surgeon: Dionicio Fonseca MD;  Location: [facility];  Service: Orthopedics;  Laterality: Right;    BREAST CYST ASPIRATION      CHOLECYSTECTOMY  2003    COLONOSCOPY      DILATION AND CURETTAGE OF UTERUS  1979    Missed Ab    HYSTERECTOMY  1986    TVH - menorrhagia    INJECTION FOR SENTINEL NODE IDENTIFICATION Bilateral 10/9/2024    Procedure: INJECTION, FOR SENTINEL NODE IDENTIFICATION;  Surgeon: Sarah Mims MD;  Location: [facility];  Service: General;  Laterality: Bilateral;    JOINT REPLACEMENT      left knee    KNEE SURGERY Left 2013    Replacement - In [place]    MASTECTOMY, PARTIAL Bilateral 10/9/2024    Procedure: MASTECTOMY, PARTIAL BILATERAL;  Surgeon: Sarah Mims MD;  Location: [facility];  Service: General;  Laterality: Bilateral;    SENTINEL LYMPH NODE BIOPSY Bilateral 10/9/2024    Procedure: BIOPSY, LYMPH NODE, SENTINEL;  Surgeon: Sarah Mims MD;  Location: [facility]  "OR;  Service: General;  Laterality: Bilateral;    THYROIDECTOMY  2013    Partial 1973    TONSILLECTOMY      TYMPANOSTOMY TUBE PLACEMENT Left      Family History   Problem Relation Name Age of Onset    Cirrhosis Father      Alzheimer's disease Father      Emphysema Father      Pneumonia Mother      Cancer Maternal Aunt      Cancer Maternal Uncle      Cancer Maternal Cousin      Breast cancer Maternal Cousin  30        mastectomy    Cancer Maternal Cousin      Cancer Maternal Cousin      Colon cancer Neg Hx      Ovarian cancer Neg Hx       Social History     Tobacco Use    Smoking status: Never    Smokeless tobacco: Never   Substance Use Topics    Alcohol use: Yes     Alcohol/week: 7.0 standard drinks of alcohol     Types: 7 Glasses of wine per week     Comment: Socially - Wine    Drug use: No       Review of patient's allergies indicates:   Allergen Reactions    Penicillin Hives and Rash     Other reaction(s): in high school, Skin Rashes/Hives        Review of Systems:  Constitutional: No fever or chills  Respiratory: No cough or shortness of breath  Cardiovascular: No chest pain or palpitations  Gastrointestinal: No nausea or vomiting  Neurological: No confusion or weakness    OBJECTIVE:     Vital Signs (Most Recent)       Vital Signs Range (Last 24H):       No intake or output data in the 24 hours ending 02/10/25 1057    Physical Exam:  General appearance: Well developed, well nourished  Eyes:  Conjunctivae/corneas clear. PERRL.  Lungs: Normal respiratory effort,   clear to auscultation bilaterally   Heart: Regular rate and rhythm, S1, S2 normal,1/6 murmur, rub or twin.  Abdomen: Soft, non-tender non-distended; bowel sounds normal; no masses,  no organomegaly, obese  Extremities: No cyanosis or clubbing. Chronic lymphedema.    Skin: Skin color, texture, turgor normal. No rashes or lesions  Neurologic: Normal strength and tone. No focal numbness or weakness       Laboratory:  No results for input(s): "WBC", "RBC", " ""HGB", "HCT", "PLT", "MCV", "MCH", "MCHC" in the last 24 hours.  BMP: No results for input(s): "GLU", "NA", "K", "CL", "CO2", "BUN", "CREATININE", "CALCIUM", "MG", "PHOS" in the last 168 hours.  Lab Results   Component Value Date    CALCIUM 8.8 01/02/2025     BNP  No results for input(s): "BNP", "BNPTRIAGEBLO" in the last 168 hours.No results found for: "URICACID"No results found for: "IRON", "TIBC", "FERRITIN", "SATURATEDIRO"  Lab Results   Component Value Date    CALCIUM 8.8 01/02/2025       Diagnostic Results:  No orders to display       ASSESSMENT/PLAN:     Left Hip:  per ortho/therapy teams.    Thyroid:  synthroid as home    Migraine:  on propanolol and stable.     MVP:  stable.     Obese:  recommend weight loss as outpt.     DVT:  defer.      Thanks for consult  See above  Will follow along.       High MDM complexity necessary to treat or prevent imminent or life-threatening deterioration of the following conditions:  thyroid/migraine  Time spent personally by me on the following activities 45 min: development of treatment plan with patient or surrogate, discussions with consultants, interpretation of cardiac output measurements, examination of patient, ordering and performing treatments and interventions, evaluation of patient's response to treatment, obtaining history from patient or surrogate, ordering and review of laboratory studies, ordering and review of radiographic studies, re-evaluation of patient's condition, pulse oximetry and blood draw for specimens  "

## 2025-02-10 NOTE — PLAN OF CARE
Case Management Final Discharge Note      Discharge Disposition: Home with HH     New DME ordered / company name: NA    Relevant SDOH / Transition of Care Barriers:  None    Person available to provide assistance at home when needed and their contact information: Family     Scheduled followup appointment: [place]     Referrals placed: NA    Transportation: family     Patient and family educated on discharge services and updated on DC plan. Bedside RN notified, patient clear to discharge from Case Management Perspective.   [facility] - Surgery ([place])  Discharge Final Note    Primary Care Provider: Bruce Artis MD    Expected Discharge Date: 2/10/2025    Final Discharge Note (most recent)       Final Note - 02/10/25 1613          Final Note    Assessment Type Final Discharge Note (P)      Anticipated Discharge Disposition Home-Health Care Svc (P)      Hospital Resources/Appts/Education Provided Provided patient/caregiver with written discharge plan information;Appointments scheduled and added to AVS (P)         Post-Acute Status    Post-Acute Authorization Home Health (P)      Home Health Status Set-up Complete/Auth obtained (P)      Discharge Delays None known at this time (P)                    Contact Info       Dionicio Fonseca MD   Specialty: Orthopedic Surgery    [address] [facility]  [facility] [address]   Phone: [phone]       Next Steps: Follow up in 4 week(s)    *[facility]   Specialty: Home Health Services, Home Therapy Services, Home Living Aide Services    [address]  [facility] [address]   Phone: [phone]       Next Steps: Follow up on 2/12/2025    Instructions: They will contact you for home healthcare appointments.

## 2025-02-10 NOTE — PLAN OF CARE
[facility]  [address]  [facility] [address]  [phone]               [facility] Orthopedic Discharge Orders    Home Eduardo           Expected Discharge Date: 2/10/25         ADMIT TO HOME HEALTH DATE: 2/12/25      Diagnoses:  Post-op  left hip(s) replacement    Patient is homebound due to:   Pt requires home health services due to taxing effort to leave the home as a result of immobility from Post-op left hip(s) replacement    Weight Bearing Status:   full weight bearing: left leg     Posterior Hip Precautions for 6 weeks, AVOID:  -Avoid greater than 90 degrees of flexion, internal rotation, and adduction  -Must sleep with hip abduction pillow or regular pillow b/t legs    Physical Therapy   3 times a week for 2 weeks (Call for further orders)  - Ambulate with a rolling walker  - Progress to cane  -Instruct on ROM and strengthening of knee  -Set up for outpt once staples removed and MOD 1 with cane    Wound Care: Daily and prn saturation  Cleanse with sterile normal saline, pat dry and apply collagen dressing and secure with telfa cover dressing. Teach patient to change daily in standing position. Dressing is water resistant and needs to be protected from getting wet while showering.  Removal and replacement of dressing after shower only needed if incision is suspected to have gotten wet during shower.  Otherwise change as previously described depending on dressing/drainage. No soaking in the tub or hot tub  for 6 weeks.    Wear  TEDS Bilateral Knee High Stockings for 3  Weeks. OK to remove stockings 1-2 hours/day max if desired.    PT/SN to remove staples 14 days Post-op and apply skin prep and steri-strips.    Cold therapy/Ice: encouraged at least 20 minutes 2-3 times daily or more if desired.  Incision must be kept waterproof while icing.      Contact:  Please contact Dr Dionicio Mg [phone] with concerns.    BLOOD THINNER:    If sent home on Xarelto         -14 days post-op for  TKR       -30 days post-op for THR     If sent home home on ASA    81mg   BID x 4 weeks     Once finished with prescribed blood thinner, patients can return to pre-surgical ASA dosage if they took ASA before surgery.     Outpatient Therapy: [facility]    [address] [address]   or  [address] [address]    Call [phone] to schedule appointment  Fax [phone]    If need orders: Call Fabi at Ext 241        DME:  - rolling Walker  - 3 in 1 commode  - tub bench / shower chair  - Hip Kit  - Per PT/OT recommendation  - Other:Sukhi Fonseca

## 2025-02-10 NOTE — OP NOTE
[facility]  Operative Report    SUMMARY     Surgery Date: 2/10/2025     Surgeons and Role:     * Dionicio Fonseca MD - Primary    Assistant: HUMBERTO Mahan    Pre-op Diagnosis:  Unilateral osteoarthritis of hip, left [M16.12]    Post-op Diagnosis:  Post-Op Diagnosis Codes:     * Unilateral osteoarthritis of hip, left [M16.12]    Procedure(s) (LRB):  ARTHROPLASTY, HIP, TOTAL, POSTERIOR APPROACH (Left) (José Manuel hybrid)    Anesthesia: Spinal    Description of Procedure: Appropriate consent was signed. The patient understood   and accepted all risks and complications. The patient was brought to the Operating Room after undergoing spinal anesthetic. Zepeda catheter was placed. The patient was then placed in a lateral decubitus position on a peg board with all bony   prominences padded. Perineal drape was applied. The Operative hip and lower extremity were then prepped and draped in a sterile manner and a mini posterior approach was utilized. Dissection was taken down to fascia, which was incised and   gluteus ivone muscle split in line of its fibers.The Charnley retractor was then   placed and the hip internally rotated. The external rotators and capsule were   taken off as one flap and peeled back to protect the sciatic nerve. It was   tagged with #5 Ethibond suture. Leg was then levelled and 2 pins were placed, 1  in the greater trochanter and 1 in the iliac crest and distance measured 10.4  cm. Pin was then removed from greater trochanter and hip dislocated. A femoral  neck osteotomy was performed in 10-15 mm above the lesser trochanter as   templated on preoperative x-rays. Femoral head was removed and proximal femur   was exposed. It was opened up with the cookie cutter, starter reamer and   lateralizing reamer. Gold metal reamers were utilized up to 13 mm and   broaching was performed to 13 mm. A thrombin-soaked sponge is placed in the   proximal femur and attention was then directed to the  acetabulum.    Labrum and soft tissue were excised and reaming was begun with a 47 mm reamer   and progressed to 53 mm. A 54 mm press-fit Trilogy metal cup with cluster   holes was then impacted obtaining excellent fixation. 2 dome screws were placed  enhancing fixation. A 36 mm neutral highly cross-linked polyethylene liner was  then snapped in the place and checked for loosening. Osteophytes were removed   from around the acetabulum.    Attention was then directed back to the proximal femur where the trial 13 mm   metal broach was placed and trials were performed. A standard neck   was required along with a 36 mm head plus 0 mm neck. Leg length measured 10.6   cm. Broach is then removed and a cement restrictor was placed down the canal and the canal washed out with the Pulsavac and canal brush and dried with a sterile tampon.  Palacos cement with gentamicin was then mixed and pressurized with a cement gun into the proximal femur.  The 13 mm heritage stem with a 12 mm centralizer was then impacted in the proximal femur and held in 20° of anteversion.  Excess cement was removed.  It was held in that position until the cement hardened. A 36 mm   ceramic head +0 mm neck was then impacted on the dried trunnion   relocated brought through full range of motion and found to be quite stable.   The hip was then washed out copiously with antibiotic solution through the   Pulsavac. The external rotators and capsule were reattached to trochanteric   attachment through drill holes utilizing #5 Ethibond suture. Exparel cocktail   was injected into the fascia and subcutaneous tissue. Fascia was closed with a   running #2 Quill suture. Subcutaneous closure was obtained with #1 and 2-0   Vicryl. Skin was then closed with staples and a sterile compressive dressing   was applied. The patient was placed in abduction pillow and brought to Recovery  Room in good condition.    Estimated Blood Loss: 300cc         Specimens:   Specimen  (24h ago, onward)      None

## 2025-02-10 NOTE — TRANSFER OF CARE
"Anesthesia Transfer of Care Note    Patient: Beth Cantu    Procedure(s) Performed: Procedure(s) (LRB):  ARTHROPLASTY, HIP, TOTAL, POSTERIOR APPROACH (Left)    Patient location: PACU    Anesthesia Type: spinal    Transport from OR: Transported from OR on 2-3 L/min O2 by NC with adequate spontaneous ventilation    Post pain: adequate analgesia    Post assessment: no apparent anesthetic complications    Post vital signs: stable    Level of consciousness: awake and alert    Nausea/Vomiting: no nausea/vomiting    Complications: none    Transfer of care protocol was followed      Last vitals: Visit Vitals  /66 (BP Location: Right forearm, Patient Position: Lying)   Pulse 67   Resp 20   Ht 5' 7" (1.702 m)   Wt 108 kg (237 lb 15.8 oz)   LMP 02/17/1986 (Approximate)   SpO2 97%   Breastfeeding No   BMI 37.27 kg/m²     "

## 2025-02-10 NOTE — PROGRESS NOTES
RUIZ Lucas, SNOW s/w Dr. Fonseca, informed UA with cx not done, he verbalized understanding, no new orders given.

## 2025-02-10 NOTE — ANESTHESIA PROCEDURE NOTES
Spinal    Diagnosis: DJD left hip  Patient location during procedure: holding area  Timeout: 2/10/2025 11:50 AM    Staffing  Authorizing Provider: Alireza Power MD  Performing Provider: Alireza Power MD    Staffing  Performed by: Alireza Power MD  Authorized by: Alireza Power MD    Preanesthetic Checklist  Completed: patient identified, IV checked, site marked, risks and benefits discussed, surgical consent, monitors and equipment checked, pre-op evaluation and timeout performed  Spinal Block  Patient position: sitting  Prep: ChloraPrep  Patient monitoring: heart rate, cardiac monitor, continuous pulse ox and frequent blood pressure checks  Approach: midline  Location: L3-4  Injection technique: single shot  CSF Fluid: clear free-flowing CSF  Needle  Needle gauge: 24 G  Needle length: 4 in  Additional Documentation: incremental injection, negative aspiration for heme and no paresthesia on injection  Needle localization: anatomical landmarks  Assessment  Ease of block: easy  Patient's tolerance of the procedure: comfortable throughout block and no complaints  Medications:    Medications: ropivacaine (NAROPIN) injection 0.5% - Intraspinal   3 mL - 2/10/2025 11:52:00 AM

## 2025-02-10 NOTE — ANESTHESIA POSTPROCEDURE EVALUATION
Anesthesia Post Evaluation    Patient: Beth Cantu    Procedure(s) Performed: Procedure(s) (LRB):  ARTHROPLASTY, HIP, TOTAL, POSTERIOR APPROACH (Left)    Final Anesthesia Type: spinal      Patient location during evaluation: PACU  Patient participation: Yes- Able to Participate  Level of consciousness: awake and alert  Post-procedure vital signs: reviewed and stable  Pain management: adequate  Airway patency: patent    PONV status at discharge: No PONV  Anesthetic complications: no      Cardiovascular status: blood pressure returned to baseline  Respiratory status: unassisted  Hydration status: euvolemic  Follow-up not needed.              Vitals Value Taken Time   /60 02/10/25 1416   Temp 36.4 °C (97.5 °F) 02/10/25 1431   Pulse 63 02/10/25 1441   Resp 16 02/10/25 1429   SpO2 99 % 02/10/25 1441   Vitals shown include unfiled device data.      Event Time   Out of Recovery 14:43:00         Pain/Sven Score: Pain Rating Prior to Med Admin: 4 (2/10/2025  2:29 PM)  Pain Rating Post Med Admin: 0 (2/10/2025  2:30 PM)  Sven Score: 10 (2/10/2025  2:26 PM)

## 2025-02-11 NOTE — PT/OT/SLP EVAL
Physical Therapy Evaluation    Patient Name:  Beth Cantu   MRN:  [id number]    Recommendations:     Discharge Recommendations: Low Intensity Therapy   Discharge Equipment Recommendations: none   Barriers to discharge: Inaccessible home    Assessment:     Beth Cantu is a 74 y.o. female admitted with a medical diagnosis of Unilateral primary osteoarthritis, left hip.  She presents with the following impairments/functional limitations: weakness, impaired endurance, impaired functional mobility, gait instability, decreased ROM, orthopedic precautions.    Rehab Prognosis: Good; patient would benefit from acute skilled PT services to address these deficits and reach maximum level of function.    Recent Surgery: Procedure(s) (LRB):  ARTHROPLASTY, HIP, TOTAL, POSTERIOR APPROACH (Left) Day of Surgery    Plan:     During this hospitalization, patient to be seen BID to address the identified rehab impairments via gait training, therapeutic activities, therapeutic exercises, neuromuscular re-education and progress toward the following goals:    Plan of Care Expires:  02/14/25    Subjective     Chief Complaint: pain, fatigue  Patient/Family Comments/goals: return home  Pain/Comfort:  Pain Rating 1: 1/10  Location - Side 1: Left  Location 1: hip  Pain Addressed 1: Pre-medicate for activity    Patients cultural, spiritual, Oriental orthodox conflicts given the current situation: no    Living Environment:  [facility] with 1 SHE, walk-in shower.    Prior to admission, patients level of function was ambulation with SPC or RW, limited ambulation 2/2 pain.  Equipment used at home: bedside commode, walker, rolling, cane, straight.  DME owned (not currently used): bedside commode.  Upon discharge, patient will have assistance from spouse.    Objective:     Communicated with RN prior to session.  Patient found up in chair with Other (comments) (no lines or devices)  upon PT entry to room.    General Precautions: Standard, other (see  comments) (Standard)  Orthopedic Precautions:Full weight bearing   Braces: N/A  Respiratory Status: Room air    Exams:  RLE ROM: WFL  RLE Strength: WFL  LLE ROM: Deficits: reduced hip flexion 2/2 pain and precautions  LLE Strength: WFL except hip flexion, hip extension, hip abduction 4-/5    Functional Mobility:  Transfers:     Sit to Stand:  stand by assistance with rolling walker  Gait: CGA with RW      AM-PAC 6 CLICK MOBILITY  Total Score:21       Treatment & Education:  Therapeutic activities:  - Sit to stand with verbal cues for hand placement, stand by assist. 2 attempts required 2/2 poor body mechanics  - Ambulation in room x25' from chair to door and back, with CGA for safety. Cues provided for step-to gait and upright posture    Patient left up in chair with call button in reach.    GOALS:   Multidisciplinary Problems       Physical Therapy Goals          Problem: Physical Therapy    Goal Priority Disciplines Outcome Interventions   Physical Therapy Goal     PT, PT/OT Progressing    Description: PT goals to be met by 2/11/25 as follows:  1. Mod I Bed Mobility - MET 2/10  2. Mod I T/F - MET 2/10  3. Gait 100' Mod I with LRAD - PARTIALLY MET 2/10  4. I with HEP - MET 2/10                         DME Justifications:  No DME recommended requiring DME justifications    History:     Past Medical History:   Diagnosis Date    Absence of both cervix and uterus, acquired     TVH    Basal cell carcinoma (BCC)     1990's    Breast cyst     Cancer     right breast    H/O bone density study 07/21/2014    Normal    H/O colonoscopy 2005    Cologard done 10/2016, Negative    H/O mammogram 2015    Normal      Herpes simplex virus (HSV) infection     Hypothyroidism associated with surgical procedure     Migraines     MVP (mitral valve prolapse)     Osteoarthritis     Overweight     Thyroid disease     Vaginal Pap smear 2006    Normal       Past Surgical History:   Procedure Laterality Date    ARTHROPLASTY, KNEE, TOTAL, SIGHT  ASSISTED Right 06/28/2021    Procedure: ARTHROPLASTY, KNEE, TOTAL, SIGHT ASSISTED;  Surgeon: Dionicio Fonseca MD;  Location: [facility];  Service: Orthopedics;  Laterality: Right;    BREAST CYST ASPIRATION      CHOLECYSTECTOMY  2003    COLONOSCOPY      DILATION AND CURETTAGE OF UTERUS  1979    Missed Ab    HYSTERECTOMY  1986    TVH - menorrhagia    INJECTION FOR SENTINEL NODE IDENTIFICATION Bilateral 10/9/2024    Procedure: INJECTION, FOR SENTINEL NODE IDENTIFICATION;  Surgeon: Sarah Mims MD;  Location: [facility];  Service: General;  Laterality: Bilateral;    JOINT REPLACEMENT      left knee    KNEE SURGERY Left 2013    Replacement - In [place]    MASTECTOMY, PARTIAL Bilateral 10/9/2024    Procedure: MASTECTOMY, PARTIAL BILATERAL;  Surgeon: Sarah Mims MD;  Location: [facility];  Service: General;  Laterality: Bilateral;    SENTINEL LYMPH NODE BIOPSY Bilateral 10/9/2024    Procedure: BIOPSY, LYMPH NODE, SENTINEL;  Surgeon: Sarha Mims MD;  Location: [facility];  Service: General;  Laterality: Bilateral;    THYROIDECTOMY  2013    Partial 1973    TONSILLECTOMY      TYMPANOSTOMY TUBE PLACEMENT Left        Time Tracking:     PT Received On: 02/10/25  PT Start Time: 1646     PT Stop Time: 1709  PT Total Time (min): 23 min     Billable Minutes: Evaluation 12 and Therapeutic Activity 11      02/10/2025

## 2025-02-11 NOTE — PLAN OF CARE
Pt presents with impairments including weakness, decreased functional capacity, and pain. She was able to tolerate transfer training and ambulation moderately well, demonstrating step-to gait with RW. She presents today below her stated functional baseline and so would benefit from skilled PT services while in acute care to address the above deficits, reduce weakness 2/2 reduced mobility, restore PLOF and improve long-term functional outcomes. Low Intensity Therapy recommended post-acute care. Equipment recs are none as patient has RW, cane and BSC already at home.     Problem: Physical Therapy  Goal: Physical Therapy Goal  Description: PT goals to be met by 2/11/25 as follows:  1. Mod I Bed Mobility - MET 2/10  2. Mod I T/F - MET 2/10  3. Gait 100' Mod I with LRAD - PARTIALLY MET 2/10  4. I with HEP - MET 2/10    Outcome: Progressing

## 2025-02-11 NOTE — PT/OT/SLP EVAL
Occupational Therapy   Evaluation and Treatment    Name: Beth Cantu  MRN: [id number]  Admitting Diagnosis: Unilateral primary osteoarthritis, left hip  Recent Surgery: Procedure(s) (LRB):  ARTHROPLASTY, HIP, TOTAL, POSTERIOR APPROACH (Left) Day of Surgery    Recommendations:     Discharge Recommendations: Low Intensity Therapy  Discharge Equipment Recommendations:  hip kit (Shower chair with arm rests, hand held shower)  Barriers to discharge:  None    Assessment:     Beth Cantu is a 74 y.o. female with a medical diagnosis of Unilateral primary osteoarthritis, left hip.  She presents with the following performance deficits affecting function: weakness, impaired endurance, impaired self care skills, impaired functional mobility, gait instability, impaired balance, decreased ROM, decreased coordination, edema, impaired skin, decreased safety awareness, decreased lower extremity function, pain, impaired joint extensibility, orthopedic precautions.      Rehab Prognosis: Good; patient would benefit from acute skilled OT services to address these deficits and reach maximum level of function.       Plan:     Patient to be seen  (Today, pt discharging to home today\) to address the above listed problems via self-care/home management  Plan of Care Expires: 02/10/25 (Pt discharging to home today.)  Plan of Care Reviewed with: patient, spouse    Subjective     Chief Complaint: Pt complained of fatigue at end of tx session.   Patient/Family Comments/goals: Pt agreeable to OT evaluation and treatment session.  Plan was for pt to discharge POD 1 but pt and  wanting pt to discharge to home today.     Occupational Profile:  Living Environment: Lives with  in [facility] with one SHE, walk in shower  Previous level of function: Mod I/Indep  Equipment Used at Home: bedside commode, cane, straight, walker, rolling  Assistance upon Discharge:     Pain/Comfort:  Pain Rating 1:  (Not rating pain or  complaining of pain.  Pain not limiting pt's ability to participate in OT evaluation and treatment session.)  Location - Side 1: Left  Location 1: hip    Patients cultural, spiritual, Adventist conflicts given the current situation: no    Objective:     Communicated with: nurseSarah prior to session.  Patient found HOB elevated with peripheral IV, cryotherapy ( in room) upon OT entry to room.    General Precautions: Standard, fall  Orthopedic Precautions: LLE posterior precautions, LLE weight bearing as tolerated  Braces: N/A  Respiratory Status: Room air    Occupational Performance:    Bed Mobility:    Supine to sit: Min A with verbal cues    Functional Mobility/Transfers: Gait belt  Sit to stand: Min A with RW progressing to CGA with RW  Bed to chair transfer: Min A with RW  Functional Mobility: Min A with RW progressing to CGA with RW  Car transfer: Education and OT demo, running board on vehicle (stepping on running board with nonsurgical LE)    Activities of Daily Living:  UB dressing: Set up while seated  LB dressing: Education, OT demo and training on ortho precautions with LB dressing. Following training, pt donned pants at Mod A using reacher, doffed socks at Min A using reacher, donned socks at Min A using sock aid, donned right shoe at Min A using long handle shoe horn and left shoe at Mod A using long handle shoe horn  Toileting: Education and OT demo on adhering to ortho precautions during toilet hygiene, pt reporting she doesn't need to toilet yet, nurse notified  Grooming: Pt too fatigued at end of tx session and needing to rest.  Education and OT demo on placement of RW when standing at sink to perform grooming tasks.  Pt verbalizing understanding.     Cognitive/Visual Perceptual:  Intact, receptive to all education and training    Physical Exam:  UE Function: AROM, Strength and Coordination are WFL for self care tasks   Sitting Balance: SBA within ortho precautions  Standing Balance: CGA  with RW  Skin: Surgical incision on posterior left hip covered by dressing  Edema: Left hip    AMPAC 6 Click ADL:  AMPAC Total Score: 16    Treatment & Education:  Role of OT, POC, left post LEÓN precautions and how to adhere to precautions during ADL and ADL mobility, use of DME and adaptive equipment, safety strategies to decrease fall risk, car transfer technique, discharge recs     Patient left up in chair with all lines intact, nurse and PT notified, and   present    GOALS:   Multidisciplinary Problems       Occupational Therapy Goals          Problem: Occupational Therapy    Goal Priority Disciplines Outcome Interventions   Occupational Therapy Goal     OT, PT/OT Progressing    Description: Goals to be met by: 2/10/25     Patient will increase functional independence with ADLs by performing:    Demonstrate understanding of left posterior LEÓN precautions and how to adhere to precautions during LB self care tasks at Indep level using adaptive equipment.  Demonstrate understanding of left posterior LEÓN precautions and how to adhere to precautions during ADL mobility and transfers using RW at Indep level.                           DME Justifications:   Beth's mobility limitation cannot be sufficiently resolved by the use of a cane. Her functional mobility deficit can be sufficiently resolved with the use of a Rolling Walker. Patient's mobility limitation significantly impairs their ability to participate in one of more activities of daily living.  The use of a RW will significantly improve the patient's ability to participate in MRADLS and the patient will use it on regular basis in the home.    History:     Past Medical History:   Diagnosis Date    Absence of both cervix and uterus, acquired     TVH    Basal cell carcinoma (BCC)     1990's    Breast cyst     Cancer     right breast    H/O bone density study 07/21/2014    Normal    H/O colonoscopy 2005    Cologard done 10/2016, Negative    H/O mammogram  2015    Normal      Herpes simplex virus (HSV) infection     Hypothyroidism associated with surgical procedure     Migraines     MVP (mitral valve prolapse)     Osteoarthritis     Overweight     Thyroid disease     Vaginal Pap smear 2006    Normal         Past Surgical History:   Procedure Laterality Date    ARTHROPLASTY, KNEE, TOTAL, SIGHT ASSISTED Right 06/28/2021    Procedure: ARTHROPLASTY, KNEE, TOTAL, SIGHT ASSISTED;  Surgeon: Dionicio Fonseca MD;  Location: [facility];  Service: Orthopedics;  Laterality: Right;    BREAST CYST ASPIRATION      CHOLECYSTECTOMY  2003    COLONOSCOPY      DILATION AND CURETTAGE OF UTERUS  1979    Missed Ab    HYSTERECTOMY  1986    TVH - menorrhagia    INJECTION FOR SENTINEL NODE IDENTIFICATION Bilateral 10/9/2024    Procedure: INJECTION, FOR SENTINEL NODE IDENTIFICATION;  Surgeon: Sarah Mims MD;  Location: [facility];  Service: General;  Laterality: Bilateral;    JOINT REPLACEMENT      left knee    KNEE SURGERY Left 2013    Replacement - In [place]    MASTECTOMY, PARTIAL Bilateral 10/9/2024    Procedure: MASTECTOMY, PARTIAL BILATERAL;  Surgeon: aSrah Mims MD;  Location: [facility];  Service: General;  Laterality: Bilateral;    SENTINEL LYMPH NODE BIOPSY Bilateral 10/9/2024    Procedure: BIOPSY, LYMPH NODE, SENTINEL;  Surgeon: Sarah Mims MD;  Location: [facility];  Service: General;  Laterality: Bilateral;    THYROIDECTOMY  2013    Partial 1973    TONSILLECTOMY      TYMPANOSTOMY TUBE PLACEMENT Left        Time Tracking:     OT Date of Treatment: 02/10/25  OT Start Time: 1520  OT Stop Time: 1633  OT Total Time (min): 73 min    Billable Minutes:Evaluation 8  Self Care/Home Management 65    2/10/2025

## 2025-02-11 NOTE — PLAN OF CARE
Problem: Occupational Therapy  Goal: Occupational Therapy Goal  Description: Goals to be met by: 2/10/25     Patient will increase functional independence with ADLs by performing:    Demonstrate understanding of left posterior LEÓN precautions and how to adhere to precautions during LB self care tasks at [facility] level using adaptive equipment.  Demonstrate understanding of left posterior LEÓN precautions and how to adhere to precautions during ADL mobility and transfers using RW at [facility] level.      Outcome: Progressing  Recommend Home Health OT and PT.

## 2025-02-11 NOTE — PLAN OF CARE
Beth Aretha Cantu has met all discharge criteria from Phase II. Vital Signs are stable, ambulating  without difficulty. Discharge instructions given, patient verbalized understanding. Discharged from facility via wheelchair in stable condition.

## 2025-02-18 ENCOUNTER — TELEPHONE (OUTPATIENT)
Dept: RADIATION ONCOLOGY | Facility: CLINIC | Age: 75
End: 2025-02-18
Payer: MEDICARE

## 2025-02-18 NOTE — TELEPHONE ENCOUNTER
Call to pt regarding f/u appt with Dr Kate 5/7/25. Appt f/u per Dr Kate was to be 3 months post radiation which would be in March 2025. Call went to . Message left for pt to call back.

## 2025-02-19 ENCOUNTER — PATIENT MESSAGE (OUTPATIENT)
Dept: HEMATOLOGY/ONCOLOGY | Facility: CLINIC | Age: 75
End: 2025-02-19
Payer: MEDICARE

## 2025-03-06 ENCOUNTER — PATIENT MESSAGE (OUTPATIENT)
Dept: RADIATION ONCOLOGY | Facility: CLINIC | Age: 75
End: 2025-03-06
Payer: MEDICARE

## 2025-03-06 ENCOUNTER — TELEPHONE (OUTPATIENT)
Dept: RADIATION ONCOLOGY | Facility: CLINIC | Age: 75
End: 2025-03-06
Payer: MEDICARE

## 2025-03-06 NOTE — TELEPHONE ENCOUNTER
Return call to pt. Per pt asks what the follow up appt will consist of. Pt states she is not having any problems and has f/u appts with surgical oncology and medical oncology in April. Pt requested appt same day. Informed pt that Dr Kate is at [facility] on Fridays.Pt will keep appt for now but may cancel as it gets closer as she is not having any issues from having radiation.   4 = No assist / stand by assistance

## 2025-03-06 NOTE — TELEPHONE ENCOUNTER
----- Message from Samina sent at 3/6/2025 11:09 AM CST -----  Regarding: Call Back  Ms. Cantu would like for you to give her a call back at your earliest convenience.You may reach her at [phone].Merna

## 2025-03-19 ENCOUNTER — PATIENT MESSAGE (OUTPATIENT)
Dept: HEMATOLOGY/ONCOLOGY | Facility: CLINIC | Age: 75
End: 2025-03-19
Payer: MEDICARE

## 2025-03-27 NOTE — PROGRESS NOTES
I spoke with Ms. Cantu this afternoon regarding her recent GI symptoms on anastrozole. She explains that in January when she first started the pill she noticed nausea and vomiting. She held the medication until her follow-up surgery in February. Upon starting again, she has had a difficult time with diarrhea. I explained that while GI GI symptoms from anastrozole are less commonly seen, this is certainly possible.  Recommended she hold anastrozole until her upcoming visit with me on 04/11. At that time we can discuss her baseline risk with and without endocrine therapy and discussed alternative endocrine therapy options for her to consider. She was in agreement with this plan.    Sarah Eduardo MD

## 2025-03-28 ENCOUNTER — LAB VISIT (OUTPATIENT)
Dept: LAB | Facility: HOSPITAL | Age: 75
End: 2025-03-28
Payer: MEDICARE

## 2025-03-28 DIAGNOSIS — I51.9 MYXEDEMA HEART DISEASE: Primary | ICD-10-CM

## 2025-03-28 DIAGNOSIS — E03.9 MYXEDEMA HEART DISEASE: Primary | ICD-10-CM

## 2025-03-28 LAB
ALBUMIN SERPL BCP-MCNC: 3.7 G/DL (ref 3.5–5.2)
ALP SERPL-CCNC: 121 UNIT/L (ref 40–150)
ALT SERPL W/O P-5'-P-CCNC: 69 UNIT/L (ref 10–44)
ANION GAP (OHS): 10 MMOL/L (ref 8–16)
AST SERPL-CCNC: 34 UNIT/L (ref 11–45)
BILIRUB SERPL-MCNC: 0.6 MG/DL (ref 0.1–1)
BUN SERPL-MCNC: 10 MG/DL (ref 8–23)
CALCIUM SERPL-MCNC: 8.6 MG/DL (ref 8.7–10.5)
CHLORIDE SERPL-SCNC: 103 MMOL/L (ref 95–110)
CO2 SERPL-SCNC: 25 MMOL/L (ref 23–29)
CREAT SERPL-MCNC: 0.9 MG/DL (ref 0.5–1.4)
GFR SERPLBLD CREATININE-BSD FMLA CKD-EPI: >60 ML/MIN/1.73/M2
GLUCOSE SERPL-MCNC: 134 MG/DL (ref 70–110)
POTASSIUM SERPL-SCNC: 4.1 MMOL/L (ref 3.5–5.1)
PROT SERPL-MCNC: 6.4 GM/DL (ref 6–8.4)
SODIUM SERPL-SCNC: 138 MMOL/L (ref 136–145)
T3FREE SERPL-MCNC: 50 NG/DL (ref 60–180)
T4 FREE SERPL-MCNC: 1.43 NG/DL (ref 0.71–1.51)
TSH SERPL-ACNC: 3.54 UIU/ML (ref 0.4–4)

## 2025-03-28 PROCEDURE — 84439 ASSAY OF FREE THYROXINE: CPT

## 2025-03-28 PROCEDURE — 84295 ASSAY OF SERUM SODIUM: CPT

## 2025-03-28 PROCEDURE — 84443 ASSAY THYROID STIM HORMONE: CPT

## 2025-03-28 PROCEDURE — 36415 COLL VENOUS BLD VENIPUNCTURE: CPT | Mod: PO

## 2025-03-28 PROCEDURE — 84480 ASSAY TRIIODOTHYRONINE (T3): CPT

## 2025-04-11 ENCOUNTER — HOSPITAL ENCOUNTER (INPATIENT)
Facility: HOSPITAL | Age: 75
LOS: 4 days | Discharge: HOME HEALTH | DRG: 274 | End: 2025-04-15
Attending: EMERGENCY MEDICINE | Admitting: EMERGENCY MEDICINE
Payer: MEDICARE

## 2025-04-11 ENCOUNTER — OFFICE VISIT (OUTPATIENT)
Dept: HEMATOLOGY/ONCOLOGY | Facility: CLINIC | Age: 75
End: 2025-04-11
Payer: MEDICARE

## 2025-04-11 ENCOUNTER — HOSPITAL ENCOUNTER (OUTPATIENT)
Dept: RADIOLOGY | Facility: HOSPITAL | Age: 75
Discharge: HOME | End: 2025-04-11
Attending: SURGERY
Payer: MEDICARE

## 2025-04-11 ENCOUNTER — HOSPITAL ENCOUNTER (OUTPATIENT)
Dept: CARDIOLOGY | Facility: CLINIC | Age: 75
Discharge: HOME | End: 2025-04-11
Payer: MEDICARE

## 2025-04-11 ENCOUNTER — OFFICE VISIT (OUTPATIENT)
Dept: SURGERY | Facility: CLINIC | Age: 75
End: 2025-04-11
Payer: MEDICARE

## 2025-04-11 VITALS
WEIGHT: 237 LBS | SYSTOLIC BLOOD PRESSURE: 118 MMHG | HEART RATE: 160 BPM | DIASTOLIC BLOOD PRESSURE: 86 MMHG | HEIGHT: 67 IN | BODY MASS INDEX: 37.2 KG/M2

## 2025-04-11 VITALS
DIASTOLIC BLOOD PRESSURE: 86 MMHG | BODY MASS INDEX: 37.2 KG/M2 | HEIGHT: 67 IN | HEART RATE: 160 BPM | SYSTOLIC BLOOD PRESSURE: 118 MMHG | WEIGHT: 237 LBS

## 2025-04-11 DIAGNOSIS — Z98.890 S/P LUMPECTOMY, RIGHT BREAST: ICD-10-CM

## 2025-04-11 DIAGNOSIS — R53.81 PHYSICAL DECONDITIONING: ICD-10-CM

## 2025-04-11 DIAGNOSIS — Z17.0 MALIGNANT NEOPLASM OF UPPER-OUTER QUADRANT OF RIGHT BREAST IN FEMALE, ESTROGEN RECEPTOR POSITIVE: ICD-10-CM

## 2025-04-11 DIAGNOSIS — Z98.890 S/P LUMPECTOMY, LEFT BREAST: ICD-10-CM

## 2025-04-11 DIAGNOSIS — R00.0 TACHYCARDIA: ICD-10-CM

## 2025-04-11 DIAGNOSIS — Z12.39 SCREENING BREAST EXAMINATION: ICD-10-CM

## 2025-04-11 DIAGNOSIS — B00.9 HERPES SIMPLEX VIRUS (HSV) INFECTION: ICD-10-CM

## 2025-04-11 DIAGNOSIS — Z79.811 LONG TERM (CURRENT) USE OF AROMATASE INHIBITORS: ICD-10-CM

## 2025-04-11 DIAGNOSIS — C50.411 MALIGNANT NEOPLASM OF UPPER-OUTER QUADRANT OF RIGHT BREAST IN FEMALE, ESTROGEN RECEPTOR POSITIVE: ICD-10-CM

## 2025-04-11 DIAGNOSIS — I48.3 TYPICAL ATRIAL FLUTTER: Primary | ICD-10-CM

## 2025-04-11 DIAGNOSIS — I48.92 ATRIAL FLUTTER: ICD-10-CM

## 2025-04-11 DIAGNOSIS — R00.0 TACHYCARDIA: Primary | ICD-10-CM

## 2025-04-11 DIAGNOSIS — Z85.3 PERSONAL HISTORY OF BREAST CANCER: Primary | ICD-10-CM

## 2025-04-11 DIAGNOSIS — I34.1 MVP (MITRAL VALVE PROLAPSE): ICD-10-CM

## 2025-04-11 DIAGNOSIS — Z12.31 SCREENING MAMMOGRAM FOR BREAST CANCER: ICD-10-CM

## 2025-04-11 LAB
ABSOLUTE EOSINOPHIL (OHS): 0.17 K/UL
ABSOLUTE MONOCYTE (OHS): 0.99 K/UL (ref 0.3–1)
ABSOLUTE NEUTROPHIL COUNT (OHS): 5.82 K/UL (ref 1.8–7.7)
ALBUMIN SERPL BCP-MCNC: 4 G/DL (ref 3.5–5.2)
ALP SERPL-CCNC: 104 UNIT/L (ref 40–150)
ALT SERPL W/O P-5'-P-CCNC: 27 UNIT/L (ref 10–44)
ANION GAP (OHS): 9 MMOL/L (ref 8–16)
APTT PPP: 22.2 SECONDS (ref 21–32)
AST SERPL-CCNC: 18 UNIT/L (ref 11–45)
BASOPHILS # BLD AUTO: 0.07 K/UL
BASOPHILS NFR BLD AUTO: 0.8 %
BILIRUB SERPL-MCNC: 0.7 MG/DL (ref 0.1–1)
BNP SERPL-MCNC: 157 PG/ML (ref 0–99)
BUN SERPL-MCNC: 14 MG/DL (ref 8–23)
CALCIUM SERPL-MCNC: 8.9 MG/DL (ref 8.7–10.5)
CHLORIDE SERPL-SCNC: 103 MMOL/L (ref 95–110)
CO2 SERPL-SCNC: 25 MMOL/L (ref 23–29)
CREAT SERPL-MCNC: 0.8 MG/DL (ref 0.5–1.4)
ERYTHROCYTE [DISTWIDTH] IN BLOOD BY AUTOMATED COUNT: 13.2 % (ref 11.5–14.5)
GFR SERPLBLD CREATININE-BSD FMLA CKD-EPI: >60 ML/MIN/1.73/M2
GLUCOSE SERPL-MCNC: 95 MG/DL (ref 70–110)
HCT VFR BLD AUTO: 45.6 % (ref 37–48.5)
HGB BLD-MCNC: 14.9 GM/DL (ref 12–16)
IMM GRANULOCYTES # BLD AUTO: 0.07 K/UL (ref 0–0.04)
IMM GRANULOCYTES NFR BLD AUTO: 0.8 % (ref 0–0.5)
LYMPHOCYTES # BLD AUTO: 1.72 K/UL (ref 1–4.8)
MAGNESIUM SERPL-MCNC: 1.9 MG/DL (ref 1.6–2.6)
MCH RBC QN AUTO: 32.7 PG (ref 27–31)
MCHC RBC AUTO-ENTMCNC: 32.7 G/DL (ref 32–36)
MCV RBC AUTO: 100 FL (ref 82–98)
NUCLEATED RBC (/100WBC) (OHS): 0 /100 WBC
OHS QRS DURATION: 78 MS
OHS QRS DURATION: 78 MS
OHS QTC CALCULATION: 440 MS
OHS QTC CALCULATION: 453 MS
PLATELET # BLD AUTO: 212 K/UL (ref 150–450)
PMV BLD AUTO: 11.9 FL (ref 9.2–12.9)
POTASSIUM SERPL-SCNC: 4.8 MMOL/L (ref 3.5–5.1)
PROT SERPL-MCNC: 6.6 GM/DL (ref 6–8.4)
RBC # BLD AUTO: 4.56 M/UL (ref 4–5.4)
RELATIVE EOSINOPHIL (OHS): 1.9 %
RELATIVE LYMPHOCYTE (OHS): 19.5 % (ref 18–48)
RELATIVE MONOCYTE (OHS): 11.2 % (ref 4–15)
RELATIVE NEUTROPHIL (OHS): 65.8 % (ref 38–73)
SODIUM SERPL-SCNC: 137 MMOL/L (ref 136–145)
TROPONIN I SERPL HS-MCNC: <3 NG/L
TSH SERPL-ACNC: 3.42 UIU/ML (ref 0.4–4)
WBC # BLD AUTO: 8.84 K/UL (ref 3.9–12.7)

## 2025-04-11 PROCEDURE — 25000003 PHARM REV CODE 250: Performed by: EMERGENCY MEDICINE

## 2025-04-11 PROCEDURE — 85025 COMPLETE CBC W/AUTO DIFF WBC: CPT | Performed by: EMERGENCY MEDICINE

## 2025-04-11 PROCEDURE — 99213 OFFICE O/P EST LOW 20 MIN: CPT | Mod: PBBFAC,25 | Performed by: PHYSICIAN ASSISTANT

## 2025-04-11 PROCEDURE — 96374 THER/PROPH/DIAG INJ IV PUSH: CPT

## 2025-04-11 PROCEDURE — 25000003 PHARM REV CODE 250: Performed by: STUDENT IN AN ORGANIZED HEALTH CARE EDUCATION/TRAINING PROGRAM

## 2025-04-11 PROCEDURE — 99999 PR PBB SHADOW E&M-EST. PATIENT-LVL III: CPT | Mod: PBBFAC,,, | Performed by: PHYSICIAN ASSISTANT

## 2025-04-11 PROCEDURE — 99285 EMERGENCY DEPT VISIT HI MDM: CPT | Mod: 25,27

## 2025-04-11 PROCEDURE — 80053 COMPREHEN METABOLIC PANEL: CPT | Performed by: EMERGENCY MEDICINE

## 2025-04-11 PROCEDURE — 77063 BREAST TOMOSYNTHESIS BI: CPT | Mod: 26,,, | Performed by: RADIOLOGY

## 2025-04-11 PROCEDURE — 63600175 PHARM REV CODE 636 W HCPCS: Performed by: STUDENT IN AN ORGANIZED HEALTH CARE EDUCATION/TRAINING PROGRAM

## 2025-04-11 PROCEDURE — 99212 OFFICE O/P EST SF 10 MIN: CPT | Mod: PBBFAC,25,27 | Performed by: STUDENT IN AN ORGANIZED HEALTH CARE EDUCATION/TRAINING PROGRAM

## 2025-04-11 PROCEDURE — 77067 SCR MAMMO BI INCL CAD: CPT | Mod: 26,,, | Performed by: RADIOLOGY

## 2025-04-11 PROCEDURE — 85730 THROMBOPLASTIN TIME PARTIAL: CPT | Performed by: STUDENT IN AN ORGANIZED HEALTH CARE EDUCATION/TRAINING PROGRAM

## 2025-04-11 PROCEDURE — 25000003 PHARM REV CODE 250: Performed by: HOSPITALIST

## 2025-04-11 PROCEDURE — 83735 ASSAY OF MAGNESIUM: CPT | Performed by: EMERGENCY MEDICINE

## 2025-04-11 PROCEDURE — 93005 ELECTROCARDIOGRAM TRACING: CPT | Mod: PBBFAC | Performed by: INTERNAL MEDICINE

## 2025-04-11 PROCEDURE — 99999 PR PBB SHADOW E&M-EST. PATIENT-LVL II: CPT | Mod: PBBFAC,,, | Performed by: STUDENT IN AN ORGANIZED HEALTH CARE EDUCATION/TRAINING PROGRAM

## 2025-04-11 PROCEDURE — 93010 ELECTROCARDIOGRAM REPORT: CPT | Mod: S$PBB,,, | Performed by: INTERNAL MEDICINE

## 2025-04-11 PROCEDURE — 96375 TX/PRO/DX INJ NEW DRUG ADDON: CPT

## 2025-04-11 PROCEDURE — 84484 ASSAY OF TROPONIN QUANT: CPT | Performed by: EMERGENCY MEDICINE

## 2025-04-11 PROCEDURE — 84443 ASSAY THYROID STIM HORMONE: CPT | Performed by: EMERGENCY MEDICINE

## 2025-04-11 PROCEDURE — 77063 BREAST TOMOSYNTHESIS BI: CPT | Mod: TC

## 2025-04-11 PROCEDURE — 83880 ASSAY OF NATRIURETIC PEPTIDE: CPT | Performed by: EMERGENCY MEDICINE

## 2025-04-11 PROCEDURE — 12000002 HC ACUTE/MED SURGE SEMI-PRIVATE ROOM

## 2025-04-11 RX ORDER — LEVOTHYROXINE SODIUM 150 UG/1
150 TABLET ORAL
Status: DISCONTINUED | OUTPATIENT
Start: 2025-04-12 | End: 2025-04-15 | Stop reason: HOSPADM

## 2025-04-11 RX ORDER — ALPRAZOLAM 0.25 MG/1
0.25 TABLET ORAL NIGHTLY PRN
Status: DISCONTINUED | OUTPATIENT
Start: 2025-04-11 | End: 2025-04-15 | Stop reason: HOSPADM

## 2025-04-11 RX ORDER — DILTIAZEM HYDROCHLORIDE 5 MG/ML
10 INJECTION INTRAVENOUS
Status: COMPLETED | OUTPATIENT
Start: 2025-04-11 | End: 2025-04-11

## 2025-04-11 RX ORDER — NALOXONE HCL 0.4 MG/ML
0.02 VIAL (ML) INJECTION
Status: DISCONTINUED | OUTPATIENT
Start: 2025-04-11 | End: 2025-04-15 | Stop reason: HOSPADM

## 2025-04-11 RX ORDER — METOPROLOL TARTRATE 1 MG/ML
5 INJECTION, SOLUTION INTRAVENOUS EVERY 6 HOURS
Status: DISCONTINUED | OUTPATIENT
Start: 2025-04-11 | End: 2025-04-12

## 2025-04-11 RX ORDER — ACETAMINOPHEN 325 MG/1
650 TABLET ORAL EVERY 8 HOURS PRN
Status: DISCONTINUED | OUTPATIENT
Start: 2025-04-11 | End: 2025-04-15 | Stop reason: HOSPADM

## 2025-04-11 RX ORDER — BISACODYL 10 MG/1
10 SUPPOSITORY RECTAL DAILY PRN
Status: DISCONTINUED | OUTPATIENT
Start: 2025-04-11 | End: 2025-04-15 | Stop reason: HOSPADM

## 2025-04-11 RX ORDER — METOPROLOL TARTRATE 1 MG/ML
5 INJECTION, SOLUTION INTRAVENOUS
Status: COMPLETED | OUTPATIENT
Start: 2025-04-11 | End: 2025-04-11

## 2025-04-11 RX ORDER — ONDANSETRON 8 MG/1
8 TABLET, ORALLY DISINTEGRATING ORAL EVERY 8 HOURS PRN
Status: DISCONTINUED | OUTPATIENT
Start: 2025-04-11 | End: 2025-04-15 | Stop reason: HOSPADM

## 2025-04-11 RX ORDER — SODIUM CHLORIDE 0.9 % (FLUSH) 0.9 %
10 SYRINGE (ML) INJECTION EVERY 12 HOURS PRN
Status: DISCONTINUED | OUTPATIENT
Start: 2025-04-11 | End: 2025-04-15 | Stop reason: HOSPADM

## 2025-04-11 RX ORDER — TALC
6 POWDER (GRAM) TOPICAL NIGHTLY PRN
Status: DISCONTINUED | OUTPATIENT
Start: 2025-04-11 | End: 2025-04-15 | Stop reason: HOSPADM

## 2025-04-11 RX ORDER — POLYETHYLENE GLYCOL 3350 17 G/17G
17 POWDER, FOR SOLUTION ORAL DAILY PRN
Status: DISCONTINUED | OUTPATIENT
Start: 2025-04-11 | End: 2025-04-15 | Stop reason: HOSPADM

## 2025-04-11 RX ORDER — PROCHLORPERAZINE EDISYLATE 5 MG/ML
5 INJECTION INTRAMUSCULAR; INTRAVENOUS EVERY 6 HOURS PRN
Status: DISCONTINUED | OUTPATIENT
Start: 2025-04-11 | End: 2025-04-15 | Stop reason: HOSPADM

## 2025-04-11 RX ORDER — HEPARIN SODIUM,PORCINE/D5W 25000/250
0-40 INTRAVENOUS SOLUTION INTRAVENOUS CONTINUOUS
Status: DISCONTINUED | OUTPATIENT
Start: 2025-04-11 | End: 2025-04-12

## 2025-04-11 RX ADMIN — METOROPROLOL TARTRATE 5 MG: 5 INJECTION, SOLUTION INTRAVENOUS at 01:04

## 2025-04-11 RX ADMIN — HEPARIN SODIUM AND DEXTROSE 12 UNITS/KG/HR: 10000; 5 INJECTION INTRAVENOUS at 06:04

## 2025-04-11 RX ADMIN — METOROPROLOL TARTRATE 5 MG: 5 INJECTION, SOLUTION INTRAVENOUS at 06:04

## 2025-04-11 RX ADMIN — DILTIAZEM HYDROCHLORIDE 10 MG: 5 INJECTION INTRAVENOUS at 03:04

## 2025-04-11 RX ADMIN — ALPRAZOLAM 0.25 MG: 0.25 TABLET ORAL at 10:04

## 2025-04-11 NOTE — ASSESSMENT & PLAN NOTE
Beth Cantu is a 75-year-old female with hypothyroidism, mitral valve prolapse, breast cancer s/p partial mastectomy and radiation who presents with tachycardia. Cardiology is consulted for new onset atrial flutter with 2:1 block, BP stable. Bedside echo with estimated EF 40-45% but in setting of tachycardia so unreliable. Patient asymptomatic. IV metop x1 and IV dilt x1 did not effect HR.     Recommendations:  - IV metoprolol 5mg q6hrs  - start heparin gtt for now  - Obtain TTE  - If by tomorrow patient remains tachycardia, may consider amiodarone for rate control.

## 2025-04-11 NOTE — PROGRESS NOTES
Pt here for EKG today.pt had a fast heart rate 160's in clinic so dr emerson ordered a f/u EKG. Pt found to be in a flutter hr 120's . Dr sandhu called to assess EKG . She spoke with staff. Md wanted pt sent to er. Dr sandhu also spoke with the ep docs to give them a brief report. Pt to remain npo as she may be able to have a dccv today per dr sandhu. Pt denies any sx. Stated she had noticed for about the last 2 weeks on her apple watch that her hr was 120's . Explained afib/flutter to  and pt and importance of rate control and anticoagulation and risks of blood clots. Also explained to her that her treatment plan would be up to the docs taking care of her . Report called to charge nurse in er

## 2025-04-11 NOTE — CONSULTS
[facility] Dept  Cardiology  Consult Note    Patient Name: Beth Cantu  MRN: [id number]  Admission Date: 4/11/2025  Hospital Length of Stay: 0 days  Code Status: Full Code   Attending Provider: Arnulfo Sears MD   Consulting Provider: Erma Johnson MD  Primary Care Physician: Bruce Artis MD  Principal Problem:<principal problem not specified>    Patient information was obtained from patient, past medical records, and ER records.     Inpatient consult to Cardiology  Consult performed by: Erma Johnson MD  Consult ordered by: Arnulfo Sears MD        Subjective:       HPI:   Beth Cantu is a 75-year-old female with hypothyroidism, mitral valve prolapse, breast cancer s/p partial mastectomy and radiation who presents with tachycardia. She was at her oncology follow up appointment today and she was found to be tachycardic to the 160s and was sent here for further evaluation. She reports that her Apple watch has been telling her that she has had an elevated heart rate for several months, more persistent over the past 2 weeks, but she thought it was an issue with the watch because she has not had any symptoms. She has not felt any palpitations, chest pain, SOB, lightheadedness/dizziness. She does report possible bilateral ankle swelling which is new. No history of Afib or abnormal heart function besides MVP. She sees cardiologist Dr. Benitez with [facility].    EKG with atrial flutter with 2:1 block rate 127. BP stable. TSH WNL.  trop <3. CXR with mildly increased basilar interstitial markings. In the ED, she received IV metoprolol and IV diltiazem with no change in HR. Bedside echo with EF 40-45% and evidence of MVP.     Past Medical History:   Diagnosis Date    Absence of both cervix and uterus, acquired     TVH    Basal cell carcinoma (BCC)     1990's    Breast cancer     Lumpectomy 9/4/2024 right breast [facility] Lumpectomy left breast 10/3/2024 [facility]    Breast cyst     Cancer      right breast    H/O bone density study 07/21/2014    Normal    H/O colonoscopy 2005    Cologard done 10/2016, Negative    H/O mammogram 2015    Normal      Herpes simplex virus (HSV) infection     Hypothyroidism associated with surgical procedure     Migraines     MVP (mitral valve prolapse)     Osteoarthritis     Overweight     Thyroid disease     Vaginal Pap smear 2006    Normal       Past Surgical History:   Procedure Laterality Date    ARTHROPLASTY OF HIP BY POSTERIOR APPROACH Left 2/10/2025    Procedure: ARTHROPLASTY, HIP, TOTAL, POSTERIOR APPROACH;  Surgeon: Dionicio Fonseca MD;  Location: [facility];  Service: Orthopedics;  Laterality: Left;  OFIRMEV    ARTHROPLASTY, KNEE, TOTAL, SIGHT ASSISTED Right 06/28/2021    Procedure: ARTHROPLASTY, KNEE, TOTAL, SIGHT ASSISTED;  Surgeon: Dionicio Fonseca MD;  Location: [facility] OR;  Service: Orthopedics;  Laterality: Right;    BREAST CYST ASPIRATION      CHOLECYSTECTOMY  2003    COLONOSCOPY      DILATION AND CURETTAGE OF UTERUS  1979    Missed Ab    HYSTERECTOMY  1986    TVH - menorrhagia    INJECTION FOR SENTINEL NODE IDENTIFICATION Bilateral 10/9/2024    Procedure: INJECTION, FOR SENTINEL NODE IDENTIFICATION;  Surgeon: Sarah Mims MD;  Location: [facility];  Service: General;  Laterality: Bilateral;    JOINT REPLACEMENT      left knee    KNEE SURGERY Left 2013    Replacement - In [place]    MASTECTOMY, PARTIAL Bilateral 10/9/2024    Procedure: MASTECTOMY, PARTIAL BILATERAL;  Surgeon: Sarah Mims MD;  Location: [facility];  Service: General;  Laterality: Bilateral;    SENTINEL LYMPH NODE BIOPSY Bilateral 10/9/2024    Procedure: BIOPSY, LYMPH NODE, SENTINEL;  Surgeon: Sarah Mims MD;  Location: [facility];  Service: General;  Laterality: Bilateral;    THYROIDECTOMY  2013    Partial 1973    TONSILLECTOMY      TYMPANOSTOMY TUBE PLACEMENT Left        Review of patient's allergies indicates:   Allergen Reactions    Penicillin Hives and Rash     Other reaction(s): in high school,  Skin Rashes/Hives       No current facility-administered medications on file prior to encounter.     Current Outpatient Medications on File Prior to Encounter   Medication Sig    ALPRAZolam (XANAX) 0.25 MG tablet Take 1 tablet (0.25 mg total) by mouth nightly as needed for Insomnia.    anastrozole (ARIMIDEX) 1 mg Tab Take 1 tablet (1 mg total) by mouth once daily.    aspirin (ECOTRIN) 81 MG EC tablet Take 1 tablet (81 mg total) by mouth 2 (two) times daily.    azelastine (ASTELIN) 137 mcg (0.1 %) nasal spray 2 sprays 2 (two) times daily.    b complex vitamins tablet Take 1 tablet by mouth once daily.    inositol/choline/biofla/vitB,C (LIPO-FLAVONOID ORAL) Take by mouth.    levothyroxine (SYNTHROID) 150 MCG tablet Take 150 mcg by mouth once daily.    loratadine (CLARITIN) 10 mg tablet Take 10 mg by mouth once daily.    multivitamin capsule Take 1 capsule by mouth once daily.    ondansetron (ZOFRAN-ODT) 8 MG TbDL dissolve 1 tablet (8 mg total) by mouth every 8 (eight) hours as needed (Nausea).    oxyCODONE-acetaminophen (PERCOCET) 5-325 mg per tablet take 1 tablet every 4 hours as needed  or 2 tablets every 6 hours as needed    propranoloL (INDERAL LA) 120 MG 24 hr capsule once daily.    sumatriptan (IMITREX) 25 MG Tab     vitamin D (VITAMIN D3) 1000 units Tab Take 1,000 Units by mouth once daily.     Family History       Problem Relation (Age of Onset)    Alzheimer's disease Father    Breast cancer Maternal Cousin (30)    Cancer Maternal Aunt, Maternal Uncle, Maternal Cousin, Maternal Cousin, Maternal Cousin    Cirrhosis Father    Emphysema Father    Pneumonia Mother          Tobacco Use    Smoking status: Never    Smokeless tobacco: Never   Substance and Sexual Activity    Alcohol use: Yes     Alcohol/week: 7.0 standard drinks of alcohol     Types: 7 Glasses of wine per week     Comment: Socially - Wine    Drug use: No    Sexual activity: Yes     Partners: Male     Birth control/protection: Surgical     Comment:  :  Klaus     Review of Systems   Constitutional: Negative for chills and fever.   Cardiovascular:  Negative for chest pain, dyspnea on exertion, orthopnea and palpitations.   Respiratory:  Negative for shortness of breath.    Gastrointestinal:  Negative for diarrhea.   Neurological:  Negative for light-headedness and weakness.   Psychiatric/Behavioral:  Negative for altered mental status.      Objective:     Vital Signs (Most Recent):  Temp: 97.6 °F (36.4 °C) (04/11/25 1247)  Pulse: (!) 124 (04/11/25 1530)  Resp: 14 (04/11/25 1530)  BP: (!) 139/91 (04/11/25 1530)  SpO2: 98 % (04/11/25 1530) Vital Signs (24h Range):  Temp:  [97.6 °F (36.4 °C)] 97.6 °F (36.4 °C)  Pulse:  [124-160] 124  Resp:  [14-21] 14  SpO2:  [95 %-99 %] 98 %  BP: (102-139)/() 139/91     Weight: 90.7 kg (200 lb)  Body mass index is 31.32 kg/m².    SpO2: 98 %       No intake or output data in the 24 hours ending 04/11/25 1646    Lines/Drains/Airways       Peripheral Intravenous Line  Duration                  Peripheral IV - Single Lumen 04/11/25 1336 20 G Anterior;Proximal;Right Forearm <1 day                     Physical Exam  Vitals and nursing note reviewed.   Constitutional:       General: She is not in acute distress.     Appearance: She is normal weight. She is not toxic-appearing.   HENT:      Head: Normocephalic and atraumatic.   Eyes:      General: No scleral icterus.  Cardiovascular:      Rate and Rhythm: Regular rhythm. Tachycardia present.      Heart sounds: No murmur heard.     Comments: Aflutter on tele  Pulmonary:      Effort: Pulmonary effort is normal. No respiratory distress.      Breath sounds: Normal breath sounds. No wheezing or rales.   Abdominal:      General: Abdomen is flat.   Musculoskeletal:      Cervical back: Normal range of motion.      Right lower leg: No edema.      Left lower leg: No edema.      Comments: No pitting edema   Skin:     General: Skin is warm.   Neurological:      Mental Status: She is  alert and oriented to person, place, and time.          Significant Labs: CMP   Recent Labs   Lab 04/11/25  1335      K 4.8      CO2 25   BUN 14   CREATININE 0.8   CALCIUM 8.9   ALBUMIN 4.0   BILITOT 0.7   ALKPHOS 104   AST 18   ALT 27   ANIONGAP 9   , CBC   Recent Labs   Lab 04/11/25  1335   WBC 8.84   HGB 14.9   HCT 45.6      , and Troponin   Recent Labs   Lab 04/11/25  1335   TROPONINIHS <3       Significant Imaging: EKG: Atrial flutter with 2:1 conduction  Assessment and Plan:     Atrial flutter  Beth Cantu is a 75-year-old female with hypothyroidism, mitral valve prolapse, breast cancer s/p partial mastectomy and radiation who presents with tachycardia. Cardiology is consulted for new onset atrial flutter with 2:1 block, BP stable. Bedside echo with estimated EF 40-45% but in setting of tachycardia so unreliable. Patient asymptomatic. IV metop x1 and IV dilt x1 did not effect HR.     Recommendations:  - IV metoprolol 5mg q6hrs  - start heparin gtt for now  - Obtain TTE  - If by tomorrow patient remains tachycardia, may consider amiodarone for rate control.        VTE Risk Mitigation (From admission, onward)           Ordered     IP VTE HIGH RISK PATIENT  Once         04/11/25 1548     Place sequential compression device  Until discontinued         04/11/25 1548                    Thank you for your consult. I will follow-up with patient. Please contact us if you have any additional questions.    Erma Johnson MD  Cardiology   [facility]

## 2025-04-11 NOTE — HPI
Mrs. Cantu is a 75-year-old female with a past medical history of breast cancer, hypothyroidism, hip replacement, mitral valve prolapse who presents from clinic with tachycardia.  Patient reports her smart watch has been showing heart rates elevated periodically in January and February of this year, but has been consistently show heart rates in the mid 100s since March.  Patient was recently treated for upper respiratory infection where she had brief episode of dyspnea.  But denies dyspnea, chest pain, lightheadedness, medication changes, dizziness, palpitations, nausea, presyncope, bilateral lower extremity swelling.  Patient was noted to have heart rate in the 160s in clinic and sent to the ED for further evaluation.  Denies previous episodes of abnormal heart rhythms.    In the ED, , /69, afebrile, and on room air.  Labs notable for TSH 3.4, troponin normal, unremarkable BNP/CBC, and unremarkable chest x-ray.  Patient given 5 mg IV metoprolol and 10 mg IV diltiazem without improvement of heart rate.   Negative

## 2025-04-11 NOTE — HPI
Beth Cantu is a 75-year-old female with hypothyroidism, mitral valve prolapse, breast cancer s/p partial mastectomy and radiation who presents with tachycardia. She was at her oncology follow up appointment today and she was found to be tachycardic to the 160s and was sent here for further evaluation. She reports that her Apple watch has been telling her that she has had an elevated heart rate for several months, more persistent over the past 2 weeks, but she thought it was an issue with the watch because she has not had any symptoms. She has not felt any palpitations, chest pain, SOB, lightheadedness/dizziness. She does report possible bilateral ankle swelling which is new. No history of Afib or abnormal heart function besides MVP. She sees cardiologist Dr. Benitez with [facility].    EKG with atrial flutter with 2:1 block rate 127. BP stable. TSH WNL.  trop <3. CXR with mildly increased basilar interstitial markings. In the ED, she received IV metoprolol and IV diltiazem with no change in HR. Bedside echo with EF 40-45% and evidence of MVP.

## 2025-04-11 NOTE — SUBJECTIVE & OBJECTIVE
Past Medical History:   Diagnosis Date    Absence of both cervix and uterus, acquired     TVH    Basal cell carcinoma (BCC)     1990's    Breast cancer     Lumpectomy 9/4/2024 right breast [facility] Lumpectomy left breast 10/3/2024 [facility]    Breast cyst     Cancer     right breast    H/O bone density study 07/21/2014    Normal    H/O colonoscopy 2005    Cologard done 10/2016, Negative    H/O mammogram 2015    Normal      Herpes simplex virus (HSV) infection     Hypothyroidism associated with surgical procedure     Migraines     MVP (mitral valve prolapse)     Osteoarthritis     Overweight     Thyroid disease     Vaginal Pap smear 2006    Normal       Past Surgical History:   Procedure Laterality Date    ARTHROPLASTY OF HIP BY POSTERIOR APPROACH Left 2/10/2025    Procedure: ARTHROPLASTY, HIP, TOTAL, POSTERIOR APPROACH;  Surgeon: Dionicio Fonseca MD;  Location: [facility];  Service: Orthopedics;  Laterality: Left;  OFIRMEV    ARTHROPLASTY, KNEE, TOTAL, SIGHT ASSISTED Right 06/28/2021    Procedure: ARTHROPLASTY, KNEE, TOTAL, SIGHT ASSISTED;  Surgeon: Dionicio Fonseca MD;  Location: [facility] OR;  Service: Orthopedics;  Laterality: Right;    BREAST CYST ASPIRATION      CHOLECYSTECTOMY  2003    COLONOSCOPY      DILATION AND CURETTAGE OF UTERUS  1979    Missed Ab    HYSTERECTOMY  1986    TVH - menorrhagia    INJECTION FOR SENTINEL NODE IDENTIFICATION Bilateral 10/9/2024    Procedure: INJECTION, FOR SENTINEL NODE IDENTIFICATION;  Surgeon: Sarah Mims MD;  Location: [facility];  Service: General;  Laterality: Bilateral;    JOINT REPLACEMENT      left knee    KNEE SURGERY Left 2013    Replacement - In [place]    MASTECTOMY, PARTIAL Bilateral 10/9/2024    Procedure: MASTECTOMY, PARTIAL BILATERAL;  Surgeon: Sarah Mims MD;  Location: [facility];  Service: General;  Laterality: Bilateral;    SENTINEL LYMPH NODE BIOPSY Bilateral 10/9/2024    Procedure: BIOPSY, LYMPH NODE, SENTINEL;  Surgeon: Sarah Mims MD;  Location: [facility] OR;  Service:  General;  Laterality: Bilateral;    THYROIDECTOMY  2013    Partial 1973    TONSILLECTOMY      TYMPANOSTOMY TUBE PLACEMENT Left        Review of patient's allergies indicates:   Allergen Reactions    Penicillin Hives and Rash     Other reaction(s): in high school, Skin Rashes/Hives       No current facility-administered medications on file prior to encounter.     Current Outpatient Medications on File Prior to Encounter   Medication Sig    ALPRAZolam (XANAX) 0.25 MG tablet Take 1 tablet (0.25 mg total) by mouth nightly as needed for Insomnia.    anastrozole (ARIMIDEX) 1 mg Tab Take 1 tablet (1 mg total) by mouth once daily.    aspirin (ECOTRIN) 81 MG EC tablet Take 1 tablet (81 mg total) by mouth 2 (two) times daily.    azelastine (ASTELIN) 137 mcg (0.1 %) nasal spray 2 sprays 2 (two) times daily.    b complex vitamins tablet Take 1 tablet by mouth once daily.    inositol/choline/biofla/vitB,C (LIPO-FLAVONOID ORAL) Take by mouth.    levothyroxine (SYNTHROID) 150 MCG tablet Take 150 mcg by mouth once daily.    loratadine (CLARITIN) 10 mg tablet Take 10 mg by mouth once daily.    multivitamin capsule Take 1 capsule by mouth once daily.    ondansetron (ZOFRAN-ODT) 8 MG TbDL dissolve 1 tablet (8 mg total) by mouth every 8 (eight) hours as needed (Nausea).    oxyCODONE-acetaminophen (PERCOCET) 5-325 mg per tablet take 1 tablet every 4 hours as needed  or 2 tablets every 6 hours as needed    propranoloL (INDERAL LA) 120 MG 24 hr capsule once daily.    sumatriptan (IMITREX) 25 MG Tab     vitamin D (VITAMIN D3) 1000 units Tab Take 1,000 Units by mouth once daily.     Family History       Problem Relation (Age of Onset)    Alzheimer's disease Father    Breast cancer Maternal Cousin (30)    Cancer Maternal Aunt, Maternal Uncle, Maternal Cousin, Maternal Cousin, Maternal Cousin    Cirrhosis Father    Emphysema Father    Pneumonia Mother          Tobacco Use    Smoking status: Never    Smokeless tobacco: Never   Substance and  Sexual Activity    Alcohol use: Yes     Alcohol/week: 7.0 standard drinks of alcohol     Types: 7 Glasses of wine per week     Comment: Socially - Wine    Drug use: No    Sexual activity: Yes     Partners: Male     Birth control/protection: Surgical     Comment: :  Klaus     Review of Systems   Constitutional: Negative for chills and fever.   Cardiovascular:  Negative for chest pain, dyspnea on exertion, orthopnea and palpitations.   Respiratory:  Negative for shortness of breath.    Gastrointestinal:  Negative for diarrhea.   Neurological:  Negative for light-headedness and weakness.   Psychiatric/Behavioral:  Negative for altered mental status.      Objective:     Vital Signs (Most Recent):  Temp: 97.6 °F (36.4 °C) (04/11/25 1247)  Pulse: (!) 124 (04/11/25 1530)  Resp: 14 (04/11/25 1530)  BP: (!) 139/91 (04/11/25 1530)  SpO2: 98 % (04/11/25 1530) Vital Signs (24h Range):  Temp:  [97.6 °F (36.4 °C)] 97.6 °F (36.4 °C)  Pulse:  [124-160] 124  Resp:  [14-21] 14  SpO2:  [95 %-99 %] 98 %  BP: (102-139)/() 139/91     Weight: 90.7 kg (200 lb)  Body mass index is 31.32 kg/m².    SpO2: 98 %       No intake or output data in the 24 hours ending 04/11/25 1646    Lines/Drains/Airways       Peripheral Intravenous Line  Duration                  Peripheral IV - Single Lumen 04/11/25 1336 20 G Anterior;Proximal;Right Forearm <1 day                     Physical Exam  Vitals and nursing note reviewed.   Constitutional:       General: She is not in acute distress.     Appearance: She is normal weight. She is not toxic-appearing.   HENT:      Head: Normocephalic and atraumatic.   Eyes:      General: No scleral icterus.  Cardiovascular:      Rate and Rhythm: Regular rhythm. Tachycardia present.      Heart sounds: No murmur heard.     Comments: Aflutter on tele  Pulmonary:      Effort: Pulmonary effort is normal. No respiratory distress.      Breath sounds: Normal breath sounds. No wheezing or rales.   Abdominal:       General: Abdomen is flat.   Musculoskeletal:      Cervical back: Normal range of motion.      Right lower leg: No edema.      Left lower leg: No edema.      Comments: No pitting edema   Skin:     General: Skin is warm.   Neurological:      Mental Status: She is alert and oriented to person, place, and time.          Significant Labs: CMP   Recent Labs   Lab 04/11/25  1335      K 4.8      CO2 25   BUN 14   CREATININE 0.8   CALCIUM 8.9   ALBUMIN 4.0   BILITOT 0.7   ALKPHOS 104   AST 18   ALT 27   ANIONGAP 9   , CBC   Recent Labs   Lab 04/11/25  1335   WBC 8.84   HGB 14.9   HCT 45.6      , and Troponin   Recent Labs   Lab 04/11/25  1335   TROPONINIHS <3       Significant Imaging: EKG: Atrial flutter with 2:1 conduction

## 2025-04-11 NOTE — Clinical Note
Notify pt,  I have reviewed your recent results    Thyroid hormones are very high. Start Methimazole 10 mg BID and repeat labs in 6 weeks (early December)     Thyroid US showed very small thyroid nodules.  Will discuss this in details at your next OV The sheath was exchanged in the right femoral vein.

## 2025-04-11 NOTE — Clinical Note
Diagnosis: Typical atrial flutter [162570]   Future Attending Provider: SEEMA CURRY [[id number]]   Reason for IP Medical Treatment  (Clinical interventions that can only be accomplished in the IP setting? ) :: new aflutter treatment

## 2025-04-11 NOTE — PROGRESS NOTES
[facility]/ [facility]      Chief Complaint: HR+ breast cancer      Cancer Staging   Malignant neoplasm of upper-outer quadrant of right breast in female, estrogen receptor positive  Staging form: Breast, AJCC 8th Edition  - Pathologic stage from 2024: Stage IA (pT1c, pN0, cM0, G1, ER+, MI+, HER2-) - Signed by Sarah Eduardo MD on 2024      HPI:  Beth Cantu is a 75 y.o. female who presents today for evaluation of newly diagnosed breast cancer. Her oncologic history is as follows:    -initially had abnonormal screening MMG 23; Diagnostic imaging 2024 significant for Rt breast asymmetry, likely benign. Recommended 6mo repeat follow up  -24 diagnostic MMG showed an asymmetry in the Rt breast UOQ spanning 1.2cm; US revealed a 1.1 x 1.0 x 0.9cm mass in the Rt breast at 10oclock, no Rt axillary LAD. Biopsy 24  confirmed ILC, grade 1. ER 90%, MI 95%, Her2 0, Ki67 20%  -24 MRI breast showed a L breast oval mass measuring 0.6cm x 0.5cm x 0.6cm in the UIQ, as well as Rt breast mass measuring 1.4 x 1.1 x 1.1cm  -10/3/24 L breast mass biopsy confirmed IDC grade 1. ER 95%, MI 95%, Her2 1+, Ki67 25%  -10/9/24 s/p Rt lumpectomy, final pathology: ILC measuring 1.5cm, grade 1. Associated LCIS, ADH. Negative margins (closest 4.5mm), 0/1LN. pT1cN0.    L breast lumpectomy final pathology: IDC measuring 7mm, grade 1. Associated DCIS measuring 1mm. Negative margins. 0/2 LN. pT1bN0  -radiation to both breast completed 2024  -started anastrozole 2024, d/c in 2024 due to GI side effects    Notes that she has recovered well from surgery with minimal post-op pain. Her medical history is notable for htn and hypothyroidism.  FH of breast cancer in her maternal cousin.      Gyn History:   Menarche: 9yo  Menopause: 35yo, s/p partial hysterectomy    Age at first pregnancy: 27yo  : Yes  HRT: Yes briefly, x  6mo    Interval History:  Ms. Cantu with since today for follow-up.  She explains that she was unable to tolerate anastrozole due to GI side effects.  The 1st time she started this she noted severe nausea and vomiting and ultimately had to stop.  She took a break and tried resuming the medication, but this time noticed diarrhea.  She has since been off of endocrine therapy in this feeling much better.  Today she reports noticing recent heart racing.  She wears an apple watch and has noticed her heart rate in the 100s.  Denies prior history of palpitations.  No additional concerns today.    Patient Active Problem List   Diagnosis    Acquired absence of both cervix and uterus (1986)    Migraine    MVP (mitral valve prolapse)    Hypothyroidism associated with surgical procedure    Severe obesity (BMI 35.0-39.9) with comorbidity    Invasive lobular carcinoma of breast in female    At risk for lymphedema    Malignant neoplasm of upper-outer quadrant of right breast in female, estrogen receptor positive    Aortic atherosclerosis    Primary osteoarthritis of left hip    Unilateral primary osteoarthritis, left hip       Current Outpatient Medications   Medication Instructions    ALPRAZolam (XANAX) 0.25 mg, Oral, Nightly PRN    anastrozole (ARIMIDEX) 1 mg, Oral, Daily    aspirin (ECOTRIN) 81 mg, Oral, 2 times daily    azelastine (ASTELIN) 137 mcg (0.1 %) nasal spray 2 sprays, 2 times daily    b complex vitamins tablet 1 tablet, Daily    inositol/choline/biofla/vitB,C (LIPO-FLAVONOID ORAL) Take by mouth.    levothyroxine (SYNTHROID) 150 mcg, Daily    loratadine (CLARITIN) 10 mg, Daily    multivitamin capsule 1 capsule, Daily    ondansetron (ZOFRAN-ODT) 8 MG TbDL dissolve 1 tablet (8 mg total) by mouth every 8 (eight) hours as needed (Nausea).    oxyCODONE-acetaminophen (PERCOCET) 5-325 mg per tablet take 1 tablet every 4 hours as needed  or 2 tablets every 6 hours as needed    propranoloL (INDERAL LA) 120 MG 24 hr capsule  "Daily    sumatriptan (IMITREX) 25 MG Tab No dose, route, or frequency recorded.    vitamin D (VITAMIN D3) 1,000 Units, Daily       Review of Systems:   Answers submitted by the patient for this visit:  Review of Systems Questionnaire (Submitted on 4/7/2025)  appetite change : No  unexpected weight change: No  mouth sores: No  visual disturbance: No  cough: Yes  shortness of breath: No  chest pain: No  abdominal pain: No  diarrhea: Yes  frequency: No  back pain: No  rash: No  headaches: No  adenopathy: No  nervous/ anxious: No      PHYSICAL EXAM:  /86   Pulse (!) 160   Ht 5' 7" (1.702 m)   Wt 107.5 kg (236 lb 15.9 oz)   LMP 02/17/1986 (Approximate) Comment: TV 1986  BMI 37.12 kg/m²     ECOG 1  General: well appearing, in no apparent distress  HEENT: Normocephalic, EOMI, anicteric sclerae, MMM  Neck: supple, without cervical or supraclavicular lymphadenopathy.  Heart: well-perfused, tachycardic, regular rhythm  Lungs: no increased wob  Breast: s/p bilateral mastectomies w well-healed incisions  Abdomen: Soft, nontender, nondistended  Extremities: No LE edema or joint effusion  Skin: warm, well-perfused, no rash  Neurologic: Alert and oriented x 4, normal speech and gait   Psychiatric: Conversing appropriately with providers throughout today's encounter.      Pertinent Labs & Imaging:  Reviewed recent labs, imaging and pathology.       Assessment & Plan:  Ms. Cantu is a prisca 76yo woman with recently diagnosed bilateral HR+ breast cancer.    She is s/p bilateral lumpectomies with final pathology showing Rt breast pT1cN0 disease and Left breast pT1bN0 disease. Today we discussed her recent diagnosis and the natural history of early stage, HR+ breast cancer. I reviewed that the standard of care for clinically low risk ER+ disease is genomic assay to further evaluate recurrence risk and potential benefit of chemotherapy. OncotypeDX is a genomic assay of 21 genes unique to the tumor, assigning a score based " on expression of high- or low-risk genes. The test reveals prognostic and predictive results based on NSABP B-14, NSABP B-20, TAILORx and RxPONDER clinical trials. These trials demonstrated that patients with low recurrence score do not benefit from the addition of chemotherapy to adjuvant endocrine therapy, while those patient with a high recurrence score do benefit from the addition of chemotherapy to adjuvant endocrine therapy.    Given her age >76yo and low clinical risk breast cancers (grade 1, very HR+, node negative), will forego Oncotype testing a I would be unlikely to recommend chemotherapy in this setting. Instead will proceed with adjuvant radiation and ET.     She has since completed radiation. Unable to tolerate anastrozole due to GI SE; open to trial of letrozole.      #Bilateral HR+ breast cancers:  --will start letrozole and see if she tolerates this better  --screening MMG 4/11/25 w LAMONTE, repeat 4/2026  --DEXA 1/2025 showing normal BD; repeat 1/2027  --cont Ca/VitD  --RTC 3mo for tolerance check; if tolerating letrozole better at follow up can space to q6mo follow up visit       #Tachycardia: appears well hydrated.   --will EKG following clinic visit today--> received call that patient noted to be in aflutter. Instructed to present to ER       All questions were answered to her apparent satisfaction. Will see her back in 3 months or sooner should the need arise.            Route Chart for Scheduling    Med Onc Chart Routing      Follow up with physician    Follow up with ANDREAS 3 months.   Infusion scheduling note    Injection scheduling note    Labs    Imaging    Pharmacy appointment    Other referrals                    Total time of this visit, including time spent face to face with patient and/or via video/audio, and also in preparing for today's visit for MDM and documentation. (Medical Decision Making, including consideration of possible diagnoses, management options, complex medical record  review, review of diagnostic tests and information, consideration and discussion of significant complications based on comorbidities, and discussion with providers involved with the care of the patient) 30 minutes. Greater than 50% was spent face to face with the patient counseling and coordinating care.      Sarah Eduardo MD

## 2025-04-12 PROBLEM — I48.3 TYPICAL ATRIAL FLUTTER: Status: ACTIVE | Noted: 2025-04-11

## 2025-04-12 PROBLEM — F41.9 ANXIETY: Status: ACTIVE | Noted: 2025-04-12

## 2025-04-12 LAB
ABSOLUTE EOSINOPHIL (OHS): 0.22 K/UL
ABSOLUTE MONOCYTE (OHS): 1.03 K/UL (ref 0.3–1)
ABSOLUTE NEUTROPHIL COUNT (OHS): 5.63 K/UL (ref 1.8–7.7)
ALBUMIN SERPL BCP-MCNC: 3.3 G/DL (ref 3.5–5.2)
ALP SERPL-CCNC: 84 UNIT/L (ref 40–150)
ALT SERPL W/O P-5'-P-CCNC: 23 UNIT/L (ref 10–44)
ANION GAP (OHS): 8 MMOL/L (ref 8–16)
APTT PPP: 104.5 SECONDS (ref 21–32)
APTT PPP: 37.4 SECONDS (ref 21–32)
APTT PPP: 51.3 SECONDS (ref 21–32)
APTT PPP: 74.6 SECONDS (ref 21–32)
AST SERPL-CCNC: 16 UNIT/L (ref 11–45)
BASOPHILS # BLD AUTO: 0.09 K/UL
BASOPHILS NFR BLD AUTO: 0.9 %
BILIRUB SERPL-MCNC: 0.6 MG/DL (ref 0.1–1)
BUN SERPL-MCNC: 14 MG/DL (ref 8–23)
CALCIUM SERPL-MCNC: 7.9 MG/DL (ref 8.7–10.5)
CHLORIDE SERPL-SCNC: 107 MMOL/L (ref 95–110)
CO2 SERPL-SCNC: 23 MMOL/L (ref 23–29)
CREAT SERPL-MCNC: 0.7 MG/DL (ref 0.5–1.4)
ERYTHROCYTE [DISTWIDTH] IN BLOOD BY AUTOMATED COUNT: 13.3 % (ref 11.5–14.5)
GFR SERPLBLD CREATININE-BSD FMLA CKD-EPI: >60 ML/MIN/1.73/M2
GLUCOSE SERPL-MCNC: 93 MG/DL (ref 70–110)
HCT VFR BLD AUTO: 43.7 % (ref 37–48.5)
HGB BLD-MCNC: 14.2 GM/DL (ref 12–16)
IMM GRANULOCYTES # BLD AUTO: 0.03 K/UL (ref 0–0.04)
IMM GRANULOCYTES NFR BLD AUTO: 0.3 % (ref 0–0.5)
LYMPHOCYTES # BLD AUTO: 2.73 K/UL (ref 1–4.8)
MCH RBC QN AUTO: 33 PG (ref 27–31)
MCHC RBC AUTO-ENTMCNC: 32.5 G/DL (ref 32–36)
MCV RBC AUTO: 102 FL (ref 82–98)
NUCLEATED RBC (/100WBC) (OHS): 0 /100 WBC
PLATELET # BLD AUTO: 203 K/UL (ref 150–450)
PMV BLD AUTO: 12 FL (ref 9.2–12.9)
POTASSIUM SERPL-SCNC: 4.1 MMOL/L (ref 3.5–5.1)
PROT SERPL-MCNC: 5.5 GM/DL (ref 6–8.4)
RBC # BLD AUTO: 4.3 M/UL (ref 4–5.4)
RELATIVE EOSINOPHIL (OHS): 2.3 %
RELATIVE LYMPHOCYTE (OHS): 28.1 % (ref 18–48)
RELATIVE MONOCYTE (OHS): 10.6 % (ref 4–15)
RELATIVE NEUTROPHIL (OHS): 57.8 % (ref 38–73)
SODIUM SERPL-SCNC: 138 MMOL/L (ref 136–145)
WBC # BLD AUTO: 9.73 K/UL (ref 3.9–12.7)

## 2025-04-12 PROCEDURE — 21400001 HC TELEMETRY ROOM

## 2025-04-12 PROCEDURE — 11000001 HC ACUTE MED/SURG PRIVATE ROOM

## 2025-04-12 PROCEDURE — 25000003 PHARM REV CODE 250: Performed by: STUDENT IN AN ORGANIZED HEALTH CARE EDUCATION/TRAINING PROGRAM

## 2025-04-12 PROCEDURE — 99223 1ST HOSP IP/OBS HIGH 75: CPT | Mod: ,,, | Performed by: INTERNAL MEDICINE

## 2025-04-12 PROCEDURE — 85730 THROMBOPLASTIN TIME PARTIAL: CPT | Performed by: STUDENT IN AN ORGANIZED HEALTH CARE EDUCATION/TRAINING PROGRAM

## 2025-04-12 PROCEDURE — 25000003 PHARM REV CODE 250: Performed by: HOSPITALIST

## 2025-04-12 PROCEDURE — 80053 COMPREHEN METABOLIC PANEL: CPT | Performed by: STUDENT IN AN ORGANIZED HEALTH CARE EDUCATION/TRAINING PROGRAM

## 2025-04-12 PROCEDURE — 85025 COMPLETE CBC W/AUTO DIFF WBC: CPT | Performed by: STUDENT IN AN ORGANIZED HEALTH CARE EDUCATION/TRAINING PROGRAM

## 2025-04-12 RX ORDER — METOPROLOL TARTRATE 25 MG/1
25 TABLET, FILM COATED ORAL
Status: DISCONTINUED | OUTPATIENT
Start: 2025-04-12 | End: 2025-04-12

## 2025-04-12 RX ORDER — CETIRIZINE HYDROCHLORIDE 10 MG/1
10 TABLET ORAL DAILY
Status: DISCONTINUED | OUTPATIENT
Start: 2025-04-12 | End: 2025-04-15

## 2025-04-12 RX ORDER — METOPROLOL TARTRATE 50 MG/1
50 TABLET ORAL
Status: DISCONTINUED | OUTPATIENT
Start: 2025-04-12 | End: 2025-04-15 | Stop reason: HOSPADM

## 2025-04-12 RX ADMIN — METOPROLOL TARTRATE 50 MG: 50 TABLET, FILM COATED ORAL at 10:04

## 2025-04-12 RX ADMIN — METOPROLOL TARTRATE 25 MG: 25 TABLET, FILM COATED ORAL at 10:04

## 2025-04-12 RX ADMIN — SODIUM CHLORIDE 250 ML: 9 INJECTION, SOLUTION INTRAVENOUS at 12:04

## 2025-04-12 RX ADMIN — APIXABAN 5 MG: 5 TABLET, FILM COATED ORAL at 10:04

## 2025-04-12 RX ADMIN — APIXABAN 5 MG: 5 TABLET, FILM COATED ORAL at 07:04

## 2025-04-12 RX ADMIN — ALPRAZOLAM 0.25 MG: 0.25 TABLET ORAL at 11:04

## 2025-04-12 RX ADMIN — METOPROLOL TARTRATE 50 MG: 50 TABLET, FILM COATED ORAL at 05:04

## 2025-04-12 RX ADMIN — METOPROLOL TARTRATE 25 MG: 25 TABLET, FILM COATED ORAL at 05:04

## 2025-04-12 RX ADMIN — CETIRIZINE HYDROCHLORIDE 10 MG: 10 TABLET, FILM COATED ORAL at 10:04

## 2025-04-12 RX ADMIN — LEVOTHYROXINE SODIUM 150 MCG: 150 TABLET ORAL at 05:04

## 2025-04-12 NOTE — ASSESSMENT & PLAN NOTE
Patient presenting from clinic with tachycardia.  Smart watch reported episodes of notable tachycardia in January in February, but sustained episodes of tachycardia since March.    - EKG in the ED with atrial flutter 2:1 in the 120s  - Not responsive to IV metoprolol or diltiazem  - TSH normal   - , troponin negative  - Cardiology consulted   -- Continue PO metoprolol, titrate to heart rate goal <110  -- Apixaban for anticoagulation  -- TTE ordered  - EP consulted  -- Considering SIMI/DCCV versus ablation while inpatient  - Telemetry

## 2025-04-12 NOTE — ED NOTES
Care handoff given to receiving floor nurse, Tona. Aware of needing to tube telemetry box to tube station 21 prior to initiating patient transport. Care ongoing.

## 2025-04-12 NOTE — ED NOTES
Assumed care of patient at this time. Patient is resting comfortably in bed in lowest, locked position with bed rails raised x2 in NAD. Pt remains in hospital gown, cardiac monitoring in place. Call light is within reach of patient. Patient denies further needs at this time.

## 2025-04-12 NOTE — SUBJECTIVE & OBJECTIVE
Interval History:   No events overnight.  Patient will continue tachycardia in the 120s 130s.  Patient with no complaints.  EP consulted.  Transitioned to PO metoprolol and oral anticoagulation.  Plan for cardioversion versus ablation on 4/14.      Review of Systems   All other systems reviewed and are negative.    Objective:     Vital Signs (Most Recent):  Temp: 97.5 °F (36.4 °C) (04/12/25 0730)  Pulse: (!) 128 (04/12/25 1303)  Resp: 20 (04/12/25 1303)  BP: 96/62 (04/12/25 1303)  SpO2: 95 % (04/12/25 1303) Vital Signs (24h Range):  Temp:  [97.5 °F (36.4 °C)-98.6 °F (37 °C)] 97.5 °F (36.4 °C)  Pulse:  [123-129] 128  Resp:  [14-26] 20  SpO2:  [94 %-99 %] 95 %  BP: ()/() 96/62     Weight: 90.7 kg (200 lb)  Body mass index is 31.32 kg/m².    Intake/Output Summary (Last 24 hours) at 4/12/2025 1410  Last data filed at 4/12/2025 0140  Gross per 24 hour   Intake 380.42 ml   Output --   Net 380.42 ml         Physical Exam  Constitutional:       General: She is not in acute distress.     Appearance: Normal appearance.   HENT:      Head: Normocephalic and atraumatic.      Mouth/Throat:      Mouth: Mucous membranes are moist.   Eyes:      Extraocular Movements: Extraocular movements intact.      Conjunctiva/sclera: Conjunctivae normal.   Cardiovascular:      Rate and Rhythm: Regular rhythm. Tachycardia present.   Pulmonary:      Effort: Pulmonary effort is normal. No respiratory distress.      Breath sounds: Normal breath sounds. No wheezing or rales.   Abdominal:      General: Abdomen is flat. Bowel sounds are normal.      Palpations: Abdomen is soft.      Tenderness: There is no abdominal tenderness. There is no guarding.   Musculoskeletal:         General: No swelling or tenderness.      Right lower leg: No edema.      Left lower leg: No edema.   Skin:     Findings: No rash.   Neurological:      General: No focal deficit present.      Mental Status: She is alert and oriented to person, place, and time. Mental  status is at baseline.   Psychiatric:         Mood and Affect: Mood normal.         Behavior: Behavior normal.               Significant Labs: All pertinent labs within the past 24 hours have been reviewed.  CBC:   Recent Labs   Lab 04/11/25  1335 04/12/25  0315   WBC 8.84 9.73   HGB 14.9 14.2   HCT 45.6 43.7    203     CMP:   Recent Labs   Lab 04/11/25  1335 04/12/25  0427    138   K 4.8 4.1    107   CO2 25 23   BUN 14 14   CREATININE 0.8 0.7   CALCIUM 8.9 7.9*   ALBUMIN 4.0 3.3*   BILITOT 0.7 0.6   ALKPHOS 104 84   AST 18 16   ALT 27 23   ANIONGAP 9 8       Significant Imaging: I have reviewed all pertinent imaging results/findings within the past 24 hours.

## 2025-04-12 NOTE — SUBJECTIVE & OBJECTIVE
Past Medical History:   Diagnosis Date    Absence of both cervix and uterus, acquired     TVH    Basal cell carcinoma (BCC)     1990's    Breast cancer     Lumpectomy 9/4/2024 right breast ILC Lumpectomy left breast 10/3/2024 IDC    Breast cyst     Cancer     right breast    H/O bone density study 07/21/2014    Normal    H/O colonoscopy 2005    Cologard done 10/2016, Negative    H/O mammogram 2015    Normal      Herpes simplex virus (HSV) infection     Hypothyroidism associated with surgical procedure     Migraines     MVP (mitral valve prolapse)     Osteoarthritis     Overweight     Thyroid disease     Vaginal Pap smear 2006    Normal       Past Surgical History:   Procedure Laterality Date    ARTHROPLASTY OF HIP BY POSTERIOR APPROACH Left 2/10/2025    Procedure: ARTHROPLASTY, HIP, TOTAL, POSTERIOR APPROACH;  Surgeon: Dionicio Fonseca MD;  Location: [facility];  Service: Orthopedics;  Laterality: Left;  OFIRMEV    ARTHROPLASTY, KNEE, TOTAL, SIGHT ASSISTED Right 06/28/2021    Procedure: ARTHROPLASTY, KNEE, TOTAL, SIGHT ASSISTED;  Surgeon: Dionicio Fonseca MD;  Location: [facility] OR;  Service: Orthopedics;  Laterality: Right;    BREAST CYST ASPIRATION      CHOLECYSTECTOMY  2003    COLONOSCOPY      DILATION AND CURETTAGE OF UTERUS  1979    Missed Ab    HYSTERECTOMY  1986    TVH - menorrhagia    INJECTION FOR SENTINEL NODE IDENTIFICATION Bilateral 10/9/2024    Procedure: INJECTION, FOR SENTINEL NODE IDENTIFICATION;  Surgeon: Sarah Mims MD;  Location: [facility];  Service: General;  Laterality: Bilateral;    JOINT REPLACEMENT      left knee    KNEE SURGERY Left 2013    Replacement - In [place]    MASTECTOMY, PARTIAL Bilateral 10/9/2024    Procedure: MASTECTOMY, PARTIAL BILATERAL;  Surgeon: Sarah Mims MD;  Location: [facility];  Service: General;  Laterality: Bilateral;    SENTINEL LYMPH NODE BIOPSY Bilateral 10/9/2024    Procedure: BIOPSY, LYMPH NODE, SENTINEL;  Surgeon: Sarah Mims MD;  Location: [facility] OR;  Service:  General;  Laterality: Bilateral;    THYROIDECTOMY  2013    Partial 1973    TONSILLECTOMY      TYMPANOSTOMY TUBE PLACEMENT Left        Review of patient's allergies indicates:   Allergen Reactions    Penicillin Hives and Rash     Other reaction(s): in high school, Skin Rashes/Hives       No current facility-administered medications on file prior to encounter.     Current Outpatient Medications on File Prior to Encounter   Medication Sig    ALPRAZolam (XANAX) 0.25 MG tablet Take 1 tablet (0.25 mg total) by mouth nightly as needed for Insomnia.    anastrozole (ARIMIDEX) 1 mg Tab Take 1 tablet (1 mg total) by mouth once daily.    aspirin (ECOTRIN) 81 MG EC tablet Take 1 tablet (81 mg total) by mouth 2 (two) times daily.    azelastine (ASTELIN) 137 mcg (0.1 %) nasal spray 2 sprays 2 (two) times daily.    b complex vitamins tablet Take 1 tablet by mouth once daily.    inositol/choline/biofla/vitB,C (LIPO-FLAVONOID ORAL) Take by mouth.    levothyroxine (SYNTHROID) 150 MCG tablet Take 150 mcg by mouth once daily.    loratadine (CLARITIN) 10 mg tablet Take 10 mg by mouth once daily.    multivitamin capsule Take 1 capsule by mouth once daily.    ondansetron (ZOFRAN-ODT) 8 MG TbDL dissolve 1 tablet (8 mg total) by mouth every 8 (eight) hours as needed (Nausea).    oxyCODONE-acetaminophen (PERCOCET) 5-325 mg per tablet take 1 tablet every 4 hours as needed  or 2 tablets every 6 hours as needed    propranoloL (INDERAL LA) 120 MG 24 hr capsule once daily.    sumatriptan (IMITREX) 25 MG Tab     vitamin D (VITAMIN D3) 1000 units Tab Take 1,000 Units by mouth once daily.     Family History       Problem Relation (Age of Onset)    Alzheimer's disease Father    Breast cancer Maternal Cousin (30)    Cancer Maternal Aunt, Maternal Uncle, Maternal Cousin, Maternal Cousin, Maternal Cousin    Cirrhosis Father    Emphysema Father    Pneumonia Mother          Tobacco Use    Smoking status: Never    Smokeless tobacco: Never   Substance and  Sexual Activity    Alcohol use: Yes     Alcohol/week: 7.0 standard drinks of alcohol     Types: 7 Glasses of wine per week     Comment: Socially - Wine    Drug use: No    Sexual activity: Yes     Partners: Male     Birth control/protection: Surgical     Comment: :  Klaus     Review of Systems   All other systems reviewed and are negative.    Objective:     Vital Signs (Most Recent):  Temp: 98.1 °F (36.7 °C) (04/11/25 1900)  Pulse: (!) 128 (04/11/25 1909)  Resp: 14 (04/11/25 1900)  BP: 120/78 (04/11/25 1900)  SpO2: 99 % (04/11/25 1900) Vital Signs (24h Range):  Temp:  [97.6 °F (36.4 °C)-98.1 °F (36.7 °C)] 98.1 °F (36.7 °C)  Pulse:  [124-160] 128  Resp:  [14-21] 14  SpO2:  [95 %-99 %] 99 %  BP: (102-139)/() 120/78     Weight: 90.7 kg (200 lb)  Body mass index is 31.32 kg/m².     Physical Exam  Constitutional:       General: She is not in acute distress.     Appearance: Normal appearance.   HENT:      Head: Normocephalic and atraumatic.      Mouth/Throat:      Mouth: Mucous membranes are moist.   Eyes:      Extraocular Movements: Extraocular movements intact.      Conjunctiva/sclera: Conjunctivae normal.   Cardiovascular:      Rate and Rhythm: Regular rhythm. Tachycardia present.   Pulmonary:      Effort: Pulmonary effort is normal. No respiratory distress.      Breath sounds: Normal breath sounds. No wheezing or rales.   Abdominal:      General: Abdomen is flat. Bowel sounds are normal.      Palpations: Abdomen is soft.      Tenderness: There is no abdominal tenderness. There is no guarding.   Musculoskeletal:         General: No swelling or tenderness.      Right lower leg: No edema.      Left lower leg: No edema.   Skin:     Findings: No rash.   Neurological:      General: No focal deficit present.      Mental Status: She is alert and oriented to person, place, and time. Mental status is at baseline.   Psychiatric:         Mood and Affect: Mood normal.         Behavior: Behavior normal.                 Significant Labs: All pertinent labs within the past 24 hours have been reviewed.  CBC:   Recent Labs   Lab 04/11/25  1335   WBC 8.84   HGB 14.9   HCT 45.6        CMP:   Recent Labs   Lab 04/11/25  1335      K 4.8      CO2 25   BUN 14   CREATININE 0.8   CALCIUM 8.9   ALBUMIN 4.0   BILITOT 0.7   ALKPHOS 104   AST 18   ALT 27   ANIONGAP 9     Cardiac Markers:   Recent Labs   Lab 04/11/25  1335   *     Troponin:   Recent Labs   Lab 04/11/25  1335   TROPONINIHS <3     TSH:   Recent Labs   Lab 04/11/25  1335   TSH 3.417       Significant Imaging: I have reviewed all pertinent imaging results/findings within the past 24 hours.

## 2025-04-12 NOTE — SUBJECTIVE & OBJECTIVE
Interval History: asymptomatic, in atrial flutter    ROS  Objective:     Vital Signs (Most Recent):  Temp: 97.5 °F (36.4 °C) (04/12/25 0730)  Pulse: (!) 128 (04/12/25 0730)  Resp: 20 (04/12/25 0730)  BP: (!) 110/59 (04/12/25 0730)  SpO2: 96 % (04/12/25 0730) Vital Signs (24h Range):  Temp:  [97.5 °F (36.4 °C)-98.6 °F (37 °C)] 97.5 °F (36.4 °C)  Pulse:  [123-160] 128  Resp:  [14-26] 20  SpO2:  [94 %-99 %] 96 %  BP: ()/() 110/59     Weight: 90.7 kg (200 lb)  Body mass index is 31.32 kg/m².     SpO2: 96 %         Intake/Output Summary (Last 24 hours) at 4/12/2025 0846  Last data filed at 4/12/2025 0140  Gross per 24 hour   Intake 380.42 ml   Output --   Net 380.42 ml       Lines/Drains/Airways       Peripheral Intravenous Line  Duration                  Peripheral IV - Single Lumen 04/11/25 1336 20 G Anterior;Proximal;Right Forearm <1 day         Peripheral IV - Single Lumen 04/12/25 0009 20 G Anterior;Left;Proximal Forearm <1 day                       Physical Exam  Vitals and nursing note reviewed.   Constitutional:       General: She is not in acute distress.     Appearance: She is normal weight. She is not toxic-appearing.   HENT:      Head: Normocephalic and atraumatic.   Eyes:      General: No scleral icterus.  Cardiovascular:      Rate and Rhythm: Regular rhythm. Tachycardia present.      Heart sounds: No murmur heard.     Comments: Aflutter on tele  Pulmonary:      Effort: Pulmonary effort is normal. No respiratory distress.      Breath sounds: Normal breath sounds. No wheezing or rales.   Abdominal:      General: Abdomen is flat.   Musculoskeletal:      Cervical back: Normal range of motion.      Right lower leg: No edema.      Left lower leg: No edema.      Comments: No pitting edema   Skin:     General: Skin is warm.   Neurological:      Mental Status: She is alert and oriented to person, place, and time.            Significant Labs: CMP   Recent Labs   Lab 04/11/25  1335 04/12/25  0427     138   K 4.8 4.1    107   CO2 25 23   BUN 14 14   CREATININE 0.8 0.7   CALCIUM 8.9 7.9*   ALBUMIN 4.0 3.3*   BILITOT 0.7 0.6   ALKPHOS 104 84   AST 18 16   ALT 27 23   ANIONGAP 9 8   , CBC   Recent Labs   Lab 04/11/25  1335 04/12/25  0315   WBC 8.84 9.73   HGB 14.9 14.2   HCT 45.6 43.7    203   , and Troponin   Recent Labs   Lab 04/11/25  1335   TROPONINIHS <3       Significant Imaging:  n/a

## 2025-04-12 NOTE — ED NOTES
Assumed care of the patient. Report received from . Pt on continuous cardiac monitoring, continuous pulse oximetry, and automatic BP cuff cycling Q30 min. Pt in hospital gown, side rails up X2, bed low and locked, and call light is placed within reach. No family/visitors at bedside at this time. Pt denies any complaints or needs. Heparin infusing at this time.  Patient denies pain at his time.  Patient up to chair at this time.  Fall precautions remain intact.  Fall risk band applied to wrist.      Beth Cantu, a 75 y.o. female presents to the ED w/ complaint of a-flutter.

## 2025-04-12 NOTE — SUBJECTIVE & OBJECTIVE
Past Medical History:   Diagnosis Date    Absence of both cervix and uterus, acquired     TVH    Basal cell carcinoma (BCC)     1990's    Breast cancer     Lumpectomy 9/4/2024 right breast [facility] Lumpectomy left breast 10/3/2024 [facility]    Breast cyst     Cancer     right breast    H/O bone density study 07/21/2014    Normal    H/O colonoscopy 2005    Cologard done 10/2016, Negative    H/O mammogram 2015    Normal      Herpes simplex virus (HSV) infection     Hypothyroidism associated with surgical procedure     Migraines     MVP (mitral valve prolapse)     Osteoarthritis     Overweight     Thyroid disease     Vaginal Pap smear 2006    Normal       Past Surgical History:   Procedure Laterality Date    ARTHROPLASTY OF HIP BY POSTERIOR APPROACH Left 2/10/2025    Procedure: ARTHROPLASTY, HIP, TOTAL, POSTERIOR APPROACH;  Surgeon: Dionicio Fonseca MD;  Location: [facility];  Service: Orthopedics;  Laterality: Left;  OFIRMEV    ARTHROPLASTY, KNEE, TOTAL, SIGHT ASSISTED Right 06/28/2021    Procedure: ARTHROPLASTY, KNEE, TOTAL, SIGHT ASSISTED;  Surgeon: Dionicio Fonseca MD;  Location: [facility] OR;  Service: Orthopedics;  Laterality: Right;    BREAST CYST ASPIRATION      CHOLECYSTECTOMY  2003    COLONOSCOPY      DILATION AND CURETTAGE OF UTERUS  1979    Missed Ab    HYSTERECTOMY  1986    TVH - menorrhagia    INJECTION FOR SENTINEL NODE IDENTIFICATION Bilateral 10/9/2024    Procedure: INJECTION, FOR SENTINEL NODE IDENTIFICATION;  Surgeon: Sarah Mims MD;  Location: [facility];  Service: General;  Laterality: Bilateral;    JOINT REPLACEMENT      left knee    KNEE SURGERY Left 2013    Replacement - In [place]    MASTECTOMY, PARTIAL Bilateral 10/9/2024    Procedure: MASTECTOMY, PARTIAL BILATERAL;  Surgeon: Sarah Mims MD;  Location: [facility];  Service: General;  Laterality: Bilateral;    SENTINEL LYMPH NODE BIOPSY Bilateral 10/9/2024    Procedure: BIOPSY, LYMPH NODE, SENTINEL;  Surgeon: Sarah Mims MD;  Location: [facility] OR;  Service:  General;  Laterality: Bilateral;    THYROIDECTOMY  2013    Partial 1973    TONSILLECTOMY      TYMPANOSTOMY TUBE PLACEMENT Left        Review of patient's allergies indicates:   Allergen Reactions    Penicillin Hives and Rash     Other reaction(s): in high school, Skin Rashes/Hives       No current facility-administered medications on file prior to encounter.     Current Outpatient Medications on File Prior to Encounter   Medication Sig    ALPRAZolam (XANAX) 0.25 MG tablet Take 1 tablet (0.25 mg total) by mouth nightly as needed for Insomnia.    anastrozole (ARIMIDEX) 1 mg Tab Take 1 tablet (1 mg total) by mouth once daily.    aspirin (ECOTRIN) 81 MG EC tablet Take 1 tablet (81 mg total) by mouth 2 (two) times daily.    azelastine (ASTELIN) 137 mcg (0.1 %) nasal spray 2 sprays 2 (two) times daily.    b complex vitamins tablet Take 1 tablet by mouth once daily.    inositol/choline/biofla/vitB,C (LIPO-FLAVONOID ORAL) Take by mouth.    levothyroxine (SYNTHROID) 150 MCG tablet Take 150 mcg by mouth once daily.    loratadine (CLARITIN) 10 mg tablet Take 10 mg by mouth once daily.    multivitamin capsule Take 1 capsule by mouth once daily.    ondansetron (ZOFRAN-ODT) 8 MG TbDL dissolve 1 tablet (8 mg total) by mouth every 8 (eight) hours as needed (Nausea).    oxyCODONE-acetaminophen (PERCOCET) 5-325 mg per tablet take 1 tablet every 4 hours as needed  or 2 tablets every 6 hours as needed    propranoloL (INDERAL LA) 120 MG 24 hr capsule once daily.    sumatriptan (IMITREX) 25 MG Tab     vitamin D (VITAMIN D3) 1000 units Tab Take 1,000 Units by mouth once daily.     Family History       Problem Relation (Age of Onset)    Alzheimer's disease Father    Breast cancer Maternal Cousin (30)    Cancer Maternal Aunt, Maternal Uncle, Maternal Cousin, Maternal Cousin, Maternal Cousin    Cirrhosis Father    Emphysema Father    Pneumonia Mother          Tobacco Use    Smoking status: Never    Smokeless tobacco: Never   Substance and  Sexual Activity    Alcohol use: Yes     Alcohol/week: 7.0 standard drinks of alcohol     Types: 7 Glasses of wine per week     Comment: Socially - Wine    Drug use: No    Sexual activity: Yes     Partners: Male     Birth control/protection: Surgical     Comment: :  Klaus     Review of Systems   Constitutional: Negative for diaphoresis and fever.   Cardiovascular:  Positive for dyspnea on exertion. Negative for chest pain, leg swelling, near-syncope, orthopnea, palpitations, paroxysmal nocturnal dyspnea and syncope.   Respiratory:  Negative for cough and shortness of breath.    Gastrointestinal:  Negative for abdominal pain, diarrhea, nausea and vomiting.   Neurological:  Negative for light-headedness.   Psychiatric/Behavioral:  Negative for altered mental status and substance abuse.      Objective:     Vital Signs (Most Recent):  Temp: 97.5 °F (36.4 °C) (04/12/25 0730)  Pulse: (!) 129 (04/12/25 1103)  Resp: 20 (04/12/25 1103)  BP: 112/60 (04/12/25 1103)  SpO2: 95 % (04/12/25 1103) Vital Signs (24h Range):  Temp:  [97.5 °F (36.4 °C)-98.6 °F (37 °C)] 97.5 °F (36.4 °C)  Pulse:  [123-129] 129  Resp:  [14-26] 20  SpO2:  [94 %-99 %] 95 %  BP: ()/() 112/60       Weight: 90.7 kg (200 lb)  Body mass index is 31.32 kg/m².    SpO2: 95 %        Physical Exam  Constitutional:       Appearance: Normal appearance.   Cardiovascular:      Rate and Rhythm: Regular rhythm. Tachycardia present.      Pulses: Normal pulses.      Heart sounds: Normal heart sounds.   Pulmonary:      Effort: Pulmonary effort is normal.      Breath sounds: Normal breath sounds. No wheezing or rales.   Abdominal:      General: Abdomen is flat. There is no distension.      Palpations: Abdomen is soft.      Tenderness: There is no abdominal tenderness.   Musculoskeletal:      Right lower leg: No edema.      Left lower leg: No edema.   Skin:     General: Skin is warm and dry.   Neurological:      Mental Status: She is alert and oriented to  person, place, and time. Mental status is at baseline.   Psychiatric:         Mood and Affect: Mood normal.         Behavior: Behavior normal.            Significant Labs: All pertinent lab results from the last 24 hours have been reviewed.    Significant Imaging:  Reviewed

## 2025-04-12 NOTE — ASSESSMENT & PLAN NOTE
Beth Cantu is a 75-year-old female with hypothyroidism, mitral valve prolapse, breast cancer s/p partial mastectomy and radiation who presents with tachycardia. Cardiology is consulted for new onset atrial flutter with 2:1 block, BP stable. Bedside echo with estimated EF 40-45% but in setting of tachycardia so unreliable. Patient asymptomatic. IV metop x1 and IV dilt x1 did not effect HR.     Recommendations:  - PO metoprolol 25mg q6hrs  - transition heparin gtt to eliquis 5mg BID  - Obtain TTE  - Consulting EP for consideration of SIMI/DCCV

## 2025-04-12 NOTE — HOSPITAL COURSE
Patient admitted to hospital medicine. Remains asymptomatic but with heart rate consistently ~124 in atrial flutter.

## 2025-04-12 NOTE — PROGRESS NOTES
[facility] - Emergency Dept  Cardiology  Progress Note    Patient Name: Beth Cantu  MRN: [id number]  Admission Date: 4/11/2025  Hospital Length of Stay: 1 days  Code Status: Full Code   Attending Physician: Arnulfo Sears MD   Primary Care Physician: Bruce Artis MD  Expected Discharge Date:   Principal Problem:Atrial flutter    Subjective:     Hospital Course:   Patient admitted to hospital medicine. Remains asymptomatic but with heart rate consistently ~124 in atrial flutter.     Interval History: asymptomatic, in atrial flutter    ROS  Objective:     Vital Signs (Most Recent):  Temp: 97.5 °F (36.4 °C) (04/12/25 0730)  Pulse: (!) 128 (04/12/25 0730)  Resp: 20 (04/12/25 0730)  BP: (!) 110/59 (04/12/25 0730)  SpO2: 96 % (04/12/25 0730) Vital Signs (24h Range):  Temp:  [97.5 °F (36.4 °C)-98.6 °F (37 °C)] 97.5 °F (36.4 °C)  Pulse:  [123-160] 128  Resp:  [14-26] 20  SpO2:  [94 %-99 %] 96 %  BP: ()/() 110/59     Weight: 90.7 kg (200 lb)  Body mass index is 31.32 kg/m².     SpO2: 96 %         Intake/Output Summary (Last 24 hours) at 4/12/2025 0846  Last data filed at 4/12/2025 0140  Gross per 24 hour   Intake 380.42 ml   Output --   Net 380.42 ml       Lines/Drains/Airways       Peripheral Intravenous Line  Duration                  Peripheral IV - Single Lumen 04/11/25 1336 20 G Anterior;Proximal;Right Forearm <1 day         Peripheral IV - Single Lumen 04/12/25 0009 20 G Anterior;Left;Proximal Forearm <1 day                       Physical Exam  Vitals and nursing note reviewed.   Constitutional:       General: She is not in acute distress.     Appearance: She is normal weight. She is not toxic-appearing.   HENT:      Head: Normocephalic and atraumatic.   Eyes:      General: No scleral icterus.  Cardiovascular:      Rate and Rhythm: Regular rhythm. Tachycardia present.      Heart sounds: No murmur heard.     Comments: Aflutter on tele  Pulmonary:      Effort: Pulmonary effort is normal. No  respiratory distress.      Breath sounds: Normal breath sounds. No wheezing or rales.   Abdominal:      General: Abdomen is flat.   Musculoskeletal:      Cervical back: Normal range of motion.      Right lower leg: No edema.      Left lower leg: No edema.      Comments: No pitting edema   Skin:     General: Skin is warm.   Neurological:      Mental Status: She is alert and oriented to person, place, and time.            Significant Labs: CMP   Recent Labs   Lab 04/11/25  1335 04/12/25  0427    138   K 4.8 4.1    107   CO2 25 23   BUN 14 14   CREATININE 0.8 0.7   CALCIUM 8.9 7.9*   ALBUMIN 4.0 3.3*   BILITOT 0.7 0.6   ALKPHOS 104 84   AST 18 16   ALT 27 23   ANIONGAP 9 8   , CBC   Recent Labs   Lab 04/11/25  1335 04/12/25  0315   WBC 8.84 9.73   HGB 14.9 14.2   HCT 45.6 43.7    203   , and Troponin   Recent Labs   Lab 04/11/25  1335   TROPONINIHS <3       Significant Imaging:  n/a  Assessment and Plan:       * Atrial flutter  Beth Cantu is a 75-year-old female with hypothyroidism, mitral valve prolapse, breast cancer s/p partial mastectomy and radiation who presents with tachycardia. Cardiology is consulted for new onset atrial flutter with 2:1 block, BP stable. Bedside echo with estimated EF 40-45% but in setting of tachycardia so unreliable. Patient asymptomatic. IV metop x1 and IV dilt x1 did not effect HR.     Recommendations:  - PO metoprolol 25mg q6hrs  - transition heparin gtt to eliquis 5mg BID  - Obtain TTE  - Consulting EP for consideration of SIMI/DCCV        VTE Risk Mitigation (From admission, onward)           Ordered     heparin 25,000 units in dextrose 5% (100 units/ml) IV bolus from bag LOW INTENSITY nomogram - [facility]  As needed (PRN)        Question:  Heparin Infusion Adjustment (DO NOT MODIFY ANSWER)  Answer:  [email or web address]\Images\Pharmacy\HeparinInfusions\heparin LOW INTENSITY nomogram for [facility] [id number]    04/11/25 1701     heparin 25,000 units in dextrose 5% (100  units/ml) IV bolus from bag LOW INTENSITY nomogram - [facility]  As needed (PRN)        Question:  Heparin Infusion Adjustment (DO NOT MODIFY ANSWER)  Answer:  [email or web address]\epic\Images\Pharmacy\HeparinInfusions\heparin LOW INTENSITY nomogram for [facility] [id number]    04/11/25 1701     heparin 25,000 units in dextrose 5% 250 mL (100 units/mL) infusion LOW INTENSITY nomogram - [facility]  Continuous        Question:  Begin at (units/kg/hr)  Answer:  12    04/11/25 1701     IP VTE HIGH RISK PATIENT  Once         04/11/25 1548     Place sequential compression device  Until discontinued         04/11/25 1548                    Erma Johnson MD  Cardiology  [facility] - Emergency Dept

## 2025-04-12 NOTE — ASSESSMENT & PLAN NOTE
75yoF with a longstanding hx of mitral valve prolapse (sees cardiology at [facility]), Br CA (low risk, on RT and hormone therapy), hypothyroidism, degenerative joint disease s/p multiple joint replacements who is here in new typical atrial flutter. EP is consulted for AFL.    This flutter has likely been going on for 2-3 weeks. She is having dyspnea on exertion with mildly elevated BNP and likely has mild volume overload in the setting of poorly rate controlled AFL.     Recommendations:  - Diurese to euvolemia with the help of gen cards if needed  - Would obtain a new echo with special attention to EF and mitral valve. Low suspicion for torn cord.  - Continue Eliquis  - Agree with rate control with Lopressor. Adjust dose to attempt for rate < 110  - If preserved EF and difficult to rate control with Lopressor, can switch to Diltiazem drip until we rhythm control her.    I discussed extensively the pathophysiology of atrial flutter, and the treatment options. We will discuss as a team tomorrow and reconvene with her to formulate a plan for cardioversion vs ablation early this week. She is amenable to either option.

## 2025-04-12 NOTE — CONSULTS
[facility] - Emergency Dept  Cardiac Electrophysiology  Consult Note    Admission Date: 4/11/2025  Code Status: Full Code   Attending Provider: Arnulfo Sears MD  Consulting Provider: Connor M Gillies, DO  Principal Problem:Atrial flutter    Inpatient consult to Electrophysiology  Consult performed by: Gillies, Connor M, DO  Consult ordered by: Arnulfo Sears MD        Subjective:     Chief Complaint:  Tachycardia     HPI:   Beth Cantu is a 75yoF with a hx of mitral valve prolapse diagnosed in her 20s (follows at [facility]), breast CA diagnosed fall 2024 on RT and hormone therapy, hypothyroidism, chronic allergic rhinitis, and bilateral knee replacements with recent hip replacement (degenerative) who presents with newly diagnosed atrial flutter. EP is consulted for new onset atrial flutter.    She has followed for 50 years for her mitral valve prolapse and has never had any other cardiac issues. This is completely new for her. She has been on treatment for her breast cancer since the fall and had her hip replaced in February. Otherwise she has been feeling good and in her normal state of health. Her samsung smart watch has been notifying her for the past 2 weeks or so of heart rates in the 120s-130s and then she was sent here from oncology clinic yesterday. She is only symptomatic with some mild dyspnea on exertion. She has a chronic cough she feels is allergy related. Cardiology was consulted, who recommended rate control and got us involved for the atrial flutter. EKG shows typical atrial flutter. No prior cardiac surgeries.     Of note, oncology states her breast CA was early stage and low risk, so she is not requiring adjuvant chemotherapy at this time.     Past Medical History:   Diagnosis Date    Absence of both cervix and uterus, acquired     TVH    Basal cell carcinoma (BCC)     1990's    Breast cancer     Lumpectomy 9/4/2024 right breast [facility] Lumpectomy left breast 10/3/2024 [facility]    Breast cyst      Cancer     right breast    H/O bone density study 07/21/2014    Normal    H/O colonoscopy 2005    Cologard done 10/2016, Negative    H/O mammogram 2015    Normal      Herpes simplex virus (HSV) infection     Hypothyroidism associated with surgical procedure     Migraines     MVP (mitral valve prolapse)     Osteoarthritis     Overweight     Thyroid disease     Vaginal Pap smear 2006    Normal       Past Surgical History:   Procedure Laterality Date    ARTHROPLASTY OF HIP BY POSTERIOR APPROACH Left 2/10/2025    Procedure: ARTHROPLASTY, HIP, TOTAL, POSTERIOR APPROACH;  Surgeon: Dionicio Fonseca MD;  Location: [facility];  Service: Orthopedics;  Laterality: Left;  OFIRMEV    ARTHROPLASTY, KNEE, TOTAL, SIGHT ASSISTED Right 06/28/2021    Procedure: ARTHROPLASTY, KNEE, TOTAL, SIGHT ASSISTED;  Surgeon: Dionicio Fonseca MD;  Location: [facility] OR;  Service: Orthopedics;  Laterality: Right;    BREAST CYST ASPIRATION      CHOLECYSTECTOMY  2003    COLONOSCOPY      DILATION AND CURETTAGE OF UTERUS  1979    Missed Ab    HYSTERECTOMY  1986    TVH - menorrhagia    INJECTION FOR SENTINEL NODE IDENTIFICATION Bilateral 10/9/2024    Procedure: INJECTION, FOR SENTINEL NODE IDENTIFICATION;  Surgeon: Sarah Mims MD;  Location: [facility];  Service: General;  Laterality: Bilateral;    JOINT REPLACEMENT      left knee    KNEE SURGERY Left 2013    Replacement - In [place]    MASTECTOMY, PARTIAL Bilateral 10/9/2024    Procedure: MASTECTOMY, PARTIAL BILATERAL;  Surgeon: Sarah Mims MD;  Location: [facility];  Service: General;  Laterality: Bilateral;    SENTINEL LYMPH NODE BIOPSY Bilateral 10/9/2024    Procedure: BIOPSY, LYMPH NODE, SENTINEL;  Surgeon: Sarah Mims MD;  Location: [facility];  Service: General;  Laterality: Bilateral;    THYROIDECTOMY  2013    Partial 1973    TONSILLECTOMY      TYMPANOSTOMY TUBE PLACEMENT Left        Review of patient's allergies indicates:   Allergen Reactions    Penicillin Hives and Rash     Other reaction(s): in  high school, Skin Rashes/Hives       No current facility-administered medications on file prior to encounter.     Current Outpatient Medications on File Prior to Encounter   Medication Sig    ALPRAZolam (XANAX) 0.25 MG tablet Take 1 tablet (0.25 mg total) by mouth nightly as needed for Insomnia.    anastrozole (ARIMIDEX) 1 mg Tab Take 1 tablet (1 mg total) by mouth once daily.    aspirin (ECOTRIN) 81 MG EC tablet Take 1 tablet (81 mg total) by mouth 2 (two) times daily.    azelastine (ASTELIN) 137 mcg (0.1 %) nasal spray 2 sprays 2 (two) times daily.    b complex vitamins tablet Take 1 tablet by mouth once daily.    inositol/choline/biofla/vitB,C (LIPO-FLAVONOID ORAL) Take by mouth.    levothyroxine (SYNTHROID) 150 MCG tablet Take 150 mcg by mouth once daily.    loratadine (CLARITIN) 10 mg tablet Take 10 mg by mouth once daily.    multivitamin capsule Take 1 capsule by mouth once daily.    ondansetron (ZOFRAN-ODT) 8 MG TbDL dissolve 1 tablet (8 mg total) by mouth every 8 (eight) hours as needed (Nausea).    oxyCODONE-acetaminophen (PERCOCET) 5-325 mg per tablet take 1 tablet every 4 hours as needed  or 2 tablets every 6 hours as needed    propranoloL (INDERAL LA) 120 MG 24 hr capsule once daily.    sumatriptan (IMITREX) 25 MG Tab     vitamin D (VITAMIN D3) 1000 units Tab Take 1,000 Units by mouth once daily.     Family History       Problem Relation (Age of Onset)    Alzheimer's disease Father    Breast cancer Maternal Cousin (30)    Cancer Maternal Aunt, Maternal Uncle, Maternal Cousin, Maternal Cousin, Maternal Cousin    Cirrhosis Father    Emphysema Father    Pneumonia Mother          Tobacco Use    Smoking status: Never    Smokeless tobacco: Never   Substance and Sexual Activity    Alcohol use: Yes     Alcohol/week: 7.0 standard drinks of alcohol     Types: 7 Glasses of wine per week     Comment: Socially - Wine    Drug use: No    Sexual activity: Yes     Partners: Male     Birth control/protection: Surgical      Comment: :  Klaus     Review of Systems   Constitutional: Negative for diaphoresis and fever.   Cardiovascular:  Positive for dyspnea on exertion. Negative for chest pain, leg swelling, near-syncope, orthopnea, palpitations, paroxysmal nocturnal dyspnea and syncope.   Respiratory:  Negative for cough and shortness of breath.    Gastrointestinal:  Negative for abdominal pain, diarrhea, nausea and vomiting.   Neurological:  Negative for light-headedness.   Psychiatric/Behavioral:  Negative for altered mental status and substance abuse.      Objective:     Vital Signs (Most Recent):  Temp: 97.5 °F (36.4 °C) (04/12/25 0730)  Pulse: (!) 129 (04/12/25 1103)  Resp: 20 (04/12/25 1103)  BP: 112/60 (04/12/25 1103)  SpO2: 95 % (04/12/25 1103) Vital Signs (24h Range):  Temp:  [97.5 °F (36.4 °C)-98.6 °F (37 °C)] 97.5 °F (36.4 °C)  Pulse:  [123-129] 129  Resp:  [14-26] 20  SpO2:  [94 %-99 %] 95 %  BP: ()/() 112/60       Weight: 90.7 kg (200 lb)  Body mass index is 31.32 kg/m².    SpO2: 95 %        Physical Exam  Constitutional:       Appearance: Normal appearance.   Cardiovascular:      Rate and Rhythm: Regular rhythm. Tachycardia present.      Pulses: Normal pulses.      Heart sounds: Normal heart sounds.   Pulmonary:      Effort: Pulmonary effort is normal.      Breath sounds: Normal breath sounds. No wheezing or rales.   Abdominal:      General: Abdomen is flat. There is no distension.      Palpations: Abdomen is soft.      Tenderness: There is no abdominal tenderness.   Musculoskeletal:      Right lower leg: No edema.      Left lower leg: No edema.   Skin:     General: Skin is warm and dry.   Neurological:      Mental Status: She is alert and oriented to person, place, and time. Mental status is at baseline.   Psychiatric:         Mood and Affect: Mood normal.         Behavior: Behavior normal.            Significant Labs: All pertinent lab results from the last 24 hours have been reviewed.    Significant  Imaging:  Reviewed            Assessment and Plan:     * Typical atrial flutter  75yoF with a longstanding hx of mitral valve prolapse (sees cardiology at [facility]), Br CA (low risk, on RT and hormone therapy), hypothyroidism, degenerative joint disease s/p multiple joint replacements who is here in new typical atrial flutter. EP is consulted for AFL.    This flutter has likely been going on for 2-3 weeks. She is having dyspnea on exertion with mildly elevated BNP and likely has mild volume overload in the setting of poorly rate controlled AFL.     Recommendations:  - Diurese to euvolemia with the help of gen cards if needed  - Would obtain a new echo with special attention to EF and mitral valve. Low suspicion for torn cord.  - Continue Eliquis  - Agree with rate control with Lopressor. Adjust dose to attempt for rate < 110  - If preserved EF and difficult to rate control with Lopressor, can switch to Diltiazem drip until we rhythm control her.    I discussed extensively the pathophysiology of atrial flutter, and the treatment options. We will discuss as a team tomorrow and reconvene with her to formulate a plan for cardioversion vs ablation early this week. She is amenable to either option.         Thank you for your consult. I will follow-up with patient. Please contact us if you have any additional questions.    Connor M Gillies, DO  Cardiac Electrophysiology  [facility] - Emergency Dept

## 2025-04-12 NOTE — H&P
[facility] - Emergency Dept  [facility] Medicine  History & Physical    Patient Name: Beth Cantu  MRN: [id number]  Patient Class: IP- Inpatient  Admission Date: 4/11/2025  Attending Physician: Arnulfo Sears MD   Primary Care Provider: Bruce Artis MD         Patient information was obtained from patient, past medical records, and ER records.     Subjective:     Principal Problem:Atrial flutter    Chief Complaint:   Chief Complaint   Patient presents with    Tachycardia     Sent from echo clinic for tachycardia, HR 160s, per clinic staff pt aflutter on EKG, pt asymptomatic, clinic states to keep patient NPO for possible cardioversion today        HPI: Mrs. Cantu is a 75-year-old female with a past medical history of breast cancer, hypothyroidism, hip replacement, mitral valve prolapse who presents from clinic with tachycardia.  Patient reports her smart watch has been showing heart rates elevated periodically in January and February of this year, but has been consistently show heart rates in the mid 100s since March.  Patient was recently treated for upper respiratory infection where she had brief episode of dyspnea.  But denies dyspnea, chest pain, lightheadedness, medication changes, dizziness, palpitations, nausea, presyncope, bilateral lower extremity swelling.  Patient was noted to have heart rate in the 160s in clinic and sent to the ED for further evaluation.  Denies previous episodes of abnormal heart rhythms.    In the ED, , /69, afebrile, and on room air.  Labs notable for TSH 3.4, troponin normal, unremarkable BNP/CBC, and unremarkable chest x-ray.  Patient given 5 mg IV metoprolol and 10 mg IV diltiazem without improvement of heart rate.    Past Medical History:   Diagnosis Date    Absence of both cervix and uterus, acquired     TVH    Basal cell carcinoma (BCC)     1990's    Breast cancer     Lumpectomy 9/4/2024 right breast [facility] Lumpectomy left breast 10/3/2024 [facility]     Breast cyst     Cancer     right breast    H/O bone density study 07/21/2014    Normal    H/O colonoscopy 2005    Cologard done 10/2016, Negative    H/O mammogram 2015    Normal      Herpes simplex virus (HSV) infection     Hypothyroidism associated with surgical procedure     Migraines     MVP (mitral valve prolapse)     Osteoarthritis     Overweight     Thyroid disease     Vaginal Pap smear 2006    Normal       Past Surgical History:   Procedure Laterality Date    ARTHROPLASTY OF HIP BY POSTERIOR APPROACH Left 2/10/2025    Procedure: ARTHROPLASTY, HIP, TOTAL, POSTERIOR APPROACH;  Surgeon: Dionicio Fonseca MD;  Location: [facility];  Service: Orthopedics;  Laterality: Left;  OFIRMEV    ARTHROPLASTY, KNEE, TOTAL, SIGHT ASSISTED Right 06/28/2021    Procedure: ARTHROPLASTY, KNEE, TOTAL, SIGHT ASSISTED;  Surgeon: Dionicio Fonseca MD;  Location: [facility] OR;  Service: Orthopedics;  Laterality: Right;    BREAST CYST ASPIRATION      CHOLECYSTECTOMY  2003    COLONOSCOPY      DILATION AND CURETTAGE OF UTERUS  1979    Missed Ab    HYSTERECTOMY  1986    TVH - menorrhagia    INJECTION FOR SENTINEL NODE IDENTIFICATION Bilateral 10/9/2024    Procedure: INJECTION, FOR SENTINEL NODE IDENTIFICATION;  Surgeon: Sarah Mims MD;  Location: [facility];  Service: General;  Laterality: Bilateral;    JOINT REPLACEMENT      left knee    KNEE SURGERY Left 2013    Replacement - In [place]    MASTECTOMY, PARTIAL Bilateral 10/9/2024    Procedure: MASTECTOMY, PARTIAL BILATERAL;  Surgeon: Sarah Mims MD;  Location: [facility];  Service: General;  Laterality: Bilateral;    SENTINEL LYMPH NODE BIOPSY Bilateral 10/9/2024    Procedure: BIOPSY, LYMPH NODE, SENTINEL;  Surgeon: Sarah Mims MD;  Location: [facility];  Service: General;  Laterality: Bilateral;    THYROIDECTOMY  2013    Partial 1973    TONSILLECTOMY      TYMPANOSTOMY TUBE PLACEMENT Left        Review of patient's allergies indicates:   Allergen Reactions    Penicillin Hives and Rash     Other  reaction(s): in high school, Skin Rashes/Hives       No current facility-administered medications on file prior to encounter.     Current Outpatient Medications on File Prior to Encounter   Medication Sig    ALPRAZolam (XANAX) 0.25 MG tablet Take 1 tablet (0.25 mg total) by mouth nightly as needed for Insomnia.    anastrozole (ARIMIDEX) 1 mg Tab Take 1 tablet (1 mg total) by mouth once daily.    aspirin (ECOTRIN) 81 MG EC tablet Take 1 tablet (81 mg total) by mouth 2 (two) times daily.    azelastine (ASTELIN) 137 mcg (0.1 %) nasal spray 2 sprays 2 (two) times daily.    b complex vitamins tablet Take 1 tablet by mouth once daily.    inositol/choline/biofla/vitB,C (LIPO-FLAVONOID ORAL) Take by mouth.    levothyroxine (SYNTHROID) 150 MCG tablet Take 150 mcg by mouth once daily.    loratadine (CLARITIN) 10 mg tablet Take 10 mg by mouth once daily.    multivitamin capsule Take 1 capsule by mouth once daily.    ondansetron (ZOFRAN-ODT) 8 MG TbDL dissolve 1 tablet (8 mg total) by mouth every 8 (eight) hours as needed (Nausea).    oxyCODONE-acetaminophen (PERCOCET) 5-325 mg per tablet take 1 tablet every 4 hours as needed  or 2 tablets every 6 hours as needed    propranoloL (INDERAL LA) 120 MG 24 hr capsule once daily.    sumatriptan (IMITREX) 25 MG Tab     vitamin D (VITAMIN D3) 1000 units Tab Take 1,000 Units by mouth once daily.     Family History       Problem Relation (Age of Onset)    Alzheimer's disease Father    Breast cancer Maternal Cousin (30)    Cancer Maternal Aunt, Maternal Uncle, Maternal Cousin, Maternal Cousin, Maternal Cousin    Cirrhosis Father    Emphysema Father    Pneumonia Mother          Tobacco Use    Smoking status: Never    Smokeless tobacco: Never   Substance and Sexual Activity    Alcohol use: Yes     Alcohol/week: 7.0 standard drinks of alcohol     Types: 7 Glasses of wine per week     Comment: Socially - Wine    Drug use: No    Sexual activity: Yes     Partners: Male     Birth  control/protection: Surgical     Comment: :  Klaus     Review of Systems   All other systems reviewed and are negative.    Objective:     Vital Signs (Most Recent):  Temp: 98.1 °F (36.7 °C) (04/11/25 1900)  Pulse: (!) 128 (04/11/25 1909)  Resp: 14 (04/11/25 1900)  BP: 120/78 (04/11/25 1900)  SpO2: 99 % (04/11/25 1900) Vital Signs (24h Range):  Temp:  [97.6 °F (36.4 °C)-98.1 °F (36.7 °C)] 98.1 °F (36.7 °C)  Pulse:  [124-160] 128  Resp:  [14-21] 14  SpO2:  [95 %-99 %] 99 %  BP: (102-139)/() 120/78     Weight: 90.7 kg (200 lb)  Body mass index is 31.32 kg/m².     Physical Exam  Constitutional:       General: She is not in acute distress.     Appearance: Normal appearance.   HENT:      Head: Normocephalic and atraumatic.      Mouth/Throat:      Mouth: Mucous membranes are moist.   Eyes:      Extraocular Movements: Extraocular movements intact.      Conjunctiva/sclera: Conjunctivae normal.   Cardiovascular:      Rate and Rhythm: Regular rhythm. Tachycardia present.   Pulmonary:      Effort: Pulmonary effort is normal. No respiratory distress.      Breath sounds: Normal breath sounds. No wheezing or rales.   Abdominal:      General: Abdomen is flat. Bowel sounds are normal.      Palpations: Abdomen is soft.      Tenderness: There is no abdominal tenderness. There is no guarding.   Musculoskeletal:         General: No swelling or tenderness.      Right lower leg: No edema.      Left lower leg: No edema.   Skin:     Findings: No rash.   Neurological:      General: No focal deficit present.      Mental Status: She is alert and oriented to person, place, and time. Mental status is at baseline.   Psychiatric:         Mood and Affect: Mood normal.         Behavior: Behavior normal.                Significant Labs: All pertinent labs within the past 24 hours have been reviewed.  CBC:   Recent Labs   Lab 04/11/25  1335   WBC 8.84   HGB 14.9   HCT 45.6        CMP:   Recent Labs   Lab 04/11/25  1335       K 4.8      CO2 25   BUN 14   CREATININE 0.8   CALCIUM 8.9   ALBUMIN 4.0   BILITOT 0.7   ALKPHOS 104   AST 18   ALT 27   ANIONGAP 9     Cardiac Markers:   Recent Labs   Lab 04/11/25  1335   *     Troponin:   Recent Labs   Lab 04/11/25  1335   TROPONINIHS <3     TSH:   Recent Labs   Lab 04/11/25  1335   TSH 3.417       Significant Imaging: I have reviewed all pertinent imaging results/findings within the past 24 hours.  Assessment/Plan:     Assessment & Plan  Atrial flutter  Patient presenting from clinic with tachycardia.  Smart watch reported episodes of notable tachycardia in January in February, but sustained episodes of tachycardia since March.    - EKG in the ED with atrial flutter 2:1 in the 120s  - Not responsive to IV metoprolol or diltiazem  - TSH normal   - , troponin negative  - Cardiology consulted   -- Continue IV metoprolol 5 mg Q 6 hours  -- Heparin drip for anticoagulation  -- TTE ordered  - Consider PO metoprolol if worsening tachycardia  - Telemetry    Hypothyroidism associated with surgical procedure  - Home levothyroxine    Invasive lobular carcinoma of breast in female  - Follows with Oncology outpatient    VTE Risk Mitigation (From admission, onward)           Ordered     heparin 25,000 units in dextrose 5% (100 units/ml) IV bolus from bag LOW INTENSITY nomogram - [facility]  As needed (PRN)        Question:  Heparin Infusion Adjustment (DO NOT MODIFY ANSWER)  Answer:  \\[email or web address]\epic\Images\Pharmacy\HeparinInfusions\heparin LOW INTENSITY nomogram for [facility] [id number]    04/11/25 1701     heparin 25,000 units in dextrose 5% (100 units/ml) IV bolus from bag LOW INTENSITY nomogram - [facility]  As needed (PRN)        Question:  Heparin Infusion Adjustment (DO NOT MODIFY ANSWER)  Answer:  \[email or web address]\[email or web address]\Images\Pharmacy\HeparinInfusions\heparin LOW INTENSITY nomogram for [facility] [id number]    04/11/25 1701     heparin 25,000 units in dextrose 5% 250 mL (100 units/mL) infusion LOW INTENSITY  nomogram - [facility]  Continuous        Question:  Begin at (units/kg/hr)  Answer:  12    04/11/25 1701     IP VTE HIGH RISK PATIENT  Once         04/11/25 1548     Place sequential compression device  Until discontinued         04/11/25 1548                                    Arnulfo Sears MD  Department of Hospital Medicine  [facility] - Emergency Dept

## 2025-04-12 NOTE — HPI
Beth Cantu is a 75yoF with a hx of mitral valve prolapse diagnosed in her 20s (follows at [facility]), breast CA diagnosed fall 2024 on RT and hormone therapy, hypothyroidism, chronic allergic rhinitis, and bilateral knee replacements with recent hip replacement (degenerative) who presents with newly diagnosed atrial flutter. EP is consulted for new onset atrial flutter.    She has followed for 50 years for her mitral valve prolapse and has never had any other cardiac issues. This is completely new for her. She has been on treatment for her breast cancer since the fall and had her hip replaced in February. Otherwise she has been feeling good and in her normal state of health. Her [employer] smart watch has been notifying her for the past 2 weeks or so of heart rates in the 120s-130s and then she was sent here from oncology clinic yesterday. She is only symptomatic with some mild dyspnea on exertion. She has a chronic cough she feels is allergy related. Cardiology was consulted, who recommended rate control and got us involved for the atrial flutter. EKG shows typical atrial flutter. No prior cardiac surgeries.     Of note, oncology states her breast CA was early stage and low risk, so she is not requiring adjuvant chemotherapy at this time.

## 2025-04-12 NOTE — ED NOTES
.5 seconds, MD Orion and MD Joy notified. Heparin infusion stopped. Pt shows no signs of bleeding.

## 2025-04-12 NOTE — ASSESSMENT & PLAN NOTE
Patient presenting from clinic with tachycardia.  Smart watch reported episodes of notable tachycardia in January in February, but sustained episodes of tachycardia since March.    - EKG in the ED with atrial flutter 2:1 in the 120s  - Not responsive to IV metoprolol or diltiazem  - TSH normal   - , troponin negative  - Cardiology consulted   -- Continue IV metoprolol 5 mg Q 6 hours  -- Heparin drip for anticoagulation  -- TTE ordered  - Consider PO metoprolol if worsening tachycardia  - Telemetry

## 2025-04-12 NOTE — ED NOTES
Care assumed from SNOW Johnson    APPEARANCE: awake, alert, and appears to be in NAD. Pain score 0/10. Remains on cont cardiac monitoring, auto BP cuff, cont pulse ox, and in hospital gown. Supportive spouse at bedside.   SKIN: warm, dry and intact. No visible breakdown or bruising.  MUSCULOSKELETAL: Patient moving all extremities spontaneously, no obvious swelling or deformities noted. Ambulates independently.  RESPIRATORY: Airway open and patent. Denies shortness of breath. Respirations unlabored.   CARDIAC: +tachycardia - atrial flutter, 2+ radial pulses bilaterally; no peripheral edema  ABDOMEN: S/ND/NT, Denies N/V/D or changes in appetite  : voids spontaneously, denies difficulty  NEUROLOGIC: AAO x 4; speaking and following commands appropriately. Equal strength in all extremities; denies numbness/tingling. Denies dizziness, visual changes, or HA. +use of bifocals

## 2025-04-12 NOTE — CLINICAL REVIEW
"RAPID RESPONSE NURSE CHART REVIEW        Chart Reviewed: 04/12/2025, 7:30 AM    MRN: [id number]  Bed: ED 32/32    Dx: Atrial flutter    Beth Cantu has a past medical history of Absence of both cervix and uterus, acquired, Basal cell carcinoma (BCC), Breast cancer, Breast cyst, Cancer, H/O bone density study, H/O colonoscopy, H/O mammogram, Herpes simplex virus (HSV) infection, Hypothyroidism associated with surgical procedure, Migraines, MVP (mitral valve prolapse), Osteoarthritis, Overweight, Thyroid disease, and Vaginal Pap smear.    Last VS: BP 95/61 (BP Location: Right arm, Patient Position: Lying)   Pulse (!) 124   Temp 98.6 °F (37 °C) (Oral)   Resp (!) 24   Ht 5' 7" (1.702 m)   Wt 90.7 kg (200 lb)   LMP 02/17/1986 (Approximate) Comment: TVH 1986  SpO2 96%   BMI 31.32 kg/m²     24H Vital Sign Range:  Temp:  [97.6 °F (36.4 °C)-98.6 °F (37 °C)]   Pulse:  [123-160]   Resp:  [14-26]   BP: ()/()   SpO2:  [94 %-99 %]     Level of Consciousness (AVPU): alert    Recent Labs     04/11/25  1335 04/12/25  0315   WBC 8.84 9.73   HGB 14.9 14.2   HCT 45.6 43.7    203       Recent Labs     04/11/25  1335 04/12/25  0427    138   K 4.8 4.1    107   CO2 25 23   BUN 14 14   CREATININE 0.8 0.7   MG 1.9  --           MEWS score: 4    Rounding completed at 1105.    Rounding completed with charge nurse Joyce for tachycardia reports patient remains in aflutter 120s with stable BP. No acute concerns verbalized at this time. Instructed to call [phone] for further concerns or assistance.    Chen Delgado RN        "

## 2025-04-12 NOTE — PROGRESS NOTES
[facility] - Emergency Dept  [facility] Medicine  Progress Note    Patient Name: Beth Cantu  MRN: [id number]  Patient Class: IP- Inpatient   Admission Date: 4/11/2025  Length of Stay: 1 days  Attending Physician: Arnulfo Sears MD  Primary Care Provider: Bruce Artis MD        Subjective     Principal Problem:Typical atrial flutter        HPI:  Mrs. Cantu is a 75-year-old female with a past medical history of breast cancer, hypothyroidism, hip replacement, mitral valve prolapse who presents from clinic with tachycardia.  Patient reports her smart watch has been showing heart rates elevated periodically in January and February of this year, but has been consistently show heart rates in the mid 100s since March.  Patient was recently treated for upper respiratory infection where she had brief episode of dyspnea.  But denies dyspnea, chest pain, lightheadedness, medication changes, dizziness, palpitations, nausea, presyncope, bilateral lower extremity swelling.  Patient was noted to have heart rate in the 160s in clinic and sent to the ED for further evaluation.  Denies previous episodes of abnormal heart rhythms.    In the ED, , /69, afebrile, and on room air.  Labs notable for TSH 3.4, troponin normal, unremarkable BNP/CBC, and unremarkable chest x-ray.  Patient given 5 mg IV metoprolol and 10 mg IV diltiazem without improvement of heart rate.    Overview/Hospital Course:  No notes on file    Interval History:   No events overnight.  Patient will continue tachycardia in the 120s 130s.  Patient with no complaints.  EP consulted.  Transitioned to PO metoprolol and oral anticoagulation.  Plan for cardioversion versus ablation on 4/14.      Review of Systems   All other systems reviewed and are negative.    Objective:     Vital Signs (Most Recent):  Temp: 97.5 °F (36.4 °C) (04/12/25 0730)  Pulse: (!) 128 (04/12/25 1303)  Resp: 20 (04/12/25 1303)  BP: 96/62 (04/12/25 1303)  SpO2: 95 % (04/12/25  1303) Vital Signs (24h Range):  Temp:  [97.5 °F (36.4 °C)-98.6 °F (37 °C)] 97.5 °F (36.4 °C)  Pulse:  [123-129] 128  Resp:  [14-26] 20  SpO2:  [94 %-99 %] 95 %  BP: ()/() 96/62     Weight: 90.7 kg (200 lb)  Body mass index is 31.32 kg/m².    Intake/Output Summary (Last 24 hours) at 4/12/2025 1410  Last data filed at 4/12/2025 0140  Gross per 24 hour   Intake 380.42 ml   Output --   Net 380.42 ml         Physical Exam  Constitutional:       General: She is not in acute distress.     Appearance: Normal appearance.   HENT:      Head: Normocephalic and atraumatic.      Mouth/Throat:      Mouth: Mucous membranes are moist.   Eyes:      Extraocular Movements: Extraocular movements intact.      Conjunctiva/sclera: Conjunctivae normal.   Cardiovascular:      Rate and Rhythm: Regular rhythm. Tachycardia present.   Pulmonary:      Effort: Pulmonary effort is normal. No respiratory distress.      Breath sounds: Normal breath sounds. No wheezing or rales.   Abdominal:      General: Abdomen is flat. Bowel sounds are normal.      Palpations: Abdomen is soft.      Tenderness: There is no abdominal tenderness. There is no guarding.   Musculoskeletal:         General: No swelling or tenderness.      Right lower leg: No edema.      Left lower leg: No edema.   Skin:     Findings: No rash.   Neurological:      General: No focal deficit present.      Mental Status: She is alert and oriented to person, place, and time. Mental status is at baseline.   Psychiatric:         Mood and Affect: Mood normal.         Behavior: Behavior normal.               Significant Labs: All pertinent labs within the past 24 hours have been reviewed.  CBC:   Recent Labs   Lab 04/11/25  1335 04/12/25  0315   WBC 8.84 9.73   HGB 14.9 14.2   HCT 45.6 43.7    203     CMP:   Recent Labs   Lab 04/11/25  1335 04/12/25  0427    138   K 4.8 4.1    107   CO2 25 23   BUN 14 14   CREATININE 0.8 0.7   CALCIUM 8.9 7.9*   ALBUMIN 4.0 3.3*    BILITOT 0.7 0.6   ALKPHOS 104 84   AST 18 16   ALT 27 23   ANIONGAP 9 8       Significant Imaging: I have reviewed all pertinent imaging results/findings within the past 24 hours.      Assessment & Plan  Typical atrial flutter  Patient presenting from clinic with tachycardia.  Smart watch reported episodes of notable tachycardia in January in February, but sustained episodes of tachycardia since March.    - EKG in the ED with atrial flutter 2:1 in the 120s  - Not responsive to IV metoprolol or diltiazem  - TSH normal   - , troponin negative  - Cardiology consulted   -- Continue PO metoprolol, titrate to heart rate goal <110  -- Apixaban for anticoagulation  -- TTE ordered  - EP consulted  -- Considering SIMI/DCCV versus ablation while inpatient  - Telemetry    Hypothyroidism associated with surgical procedure  - Home levothyroxine    Invasive lobular carcinoma of breast in female  - Follows with Oncology outpatient    Anxiety  - Home Xanax PRN    VTE Risk Mitigation (From admission, onward)           Ordered     apixaban tablet 5 mg  2 times daily         04/12/25 0904     IP VTE HIGH RISK PATIENT  Once         04/11/25 1548     Place sequential compression device  Until discontinued         04/11/25 1548                    Discharge Planning   STEFANIE:      Code Status: Full Code   Medical Readiness for Discharge Date:                            Arnulfo Sears MD  Department of Hospital Medicine   [facility] Dept

## 2025-04-13 LAB
ABO GROUP BLD: NORMAL
ABSOLUTE EOSINOPHIL (OHS): 0.22 K/UL
ABSOLUTE MONOCYTE (OHS): 1.4 K/UL (ref 0.3–1)
ABSOLUTE NEUTROPHIL COUNT (OHS): 5.82 K/UL (ref 1.8–7.7)
ALBUMIN SERPL BCP-MCNC: 3.2 G/DL (ref 3.5–5.2)
ALP SERPL-CCNC: 82 UNIT/L (ref 40–150)
ALT SERPL W/O P-5'-P-CCNC: 21 UNIT/L (ref 10–44)
ANION GAP (OHS): 10 MMOL/L (ref 8–16)
ASCENDING AORTA: 2.64 CM
AST SERPL-CCNC: 21 UNIT/L (ref 11–45)
AV AREA BY CONTINUOUS VTI: 2.8 CM2
AV INDEX (PROSTH): 0.94
AV LVOT MEAN GRADIENT: 2 MMHG
AV LVOT PEAK GRADIENT: 3 MMHG
AV MEAN GRADIENT: 4 MMHG
AV PEAK GRADIENT: 7 MMHG
AV VALVE AREA BY VELOCITY RATIO: 2 CM²
AV VALVE AREA: 2.7 CM2
AV VELOCITY RATIO: 0.69
BASOPHILS # BLD AUTO: 0.09 K/UL
BASOPHILS NFR BLD AUTO: 0.9 %
BILIRUB SERPL-MCNC: 0.6 MG/DL (ref 0.1–1)
BLOOD GROUP ANTIBODIES SERPL: NORMAL
BSA FOR ECHO PROCEDURE: 2.07 M2
BUN SERPL-MCNC: 15 MG/DL (ref 8–23)
CALCIUM SERPL-MCNC: 8.2 MG/DL (ref 8.7–10.5)
CHLORIDE SERPL-SCNC: 108 MMOL/L (ref 95–110)
CO2 SERPL-SCNC: 18 MMOL/L (ref 23–29)
CREAT SERPL-MCNC: 0.7 MG/DL (ref 0.5–1.4)
CV ECHO LV RWT: 0.45 CM
DOP CALC AO PEAK VEL: 1.3 M/S
DOP CALC AO VTI: 17.2 CM
DOP CALC LVOT AREA: 2.8 CM2
DOP CALC LVOT DIAMETER: 1.9 CM
DOP CALC LVOT PEAK VEL: 0.9 M/S
DOP CALC LVOT STROKE VOLUME: 45.6 CM3
DOP CALCLVOT PEAK VEL VTI: 16.1 CM
E/E' RATIO: 8 M/S
ECHO EF ESTIMATED: 61 %
ECHO LV POSTERIOR WALL: 0.9 CM (ref 0.6–1.1)
ERYTHROCYTE [DISTWIDTH] IN BLOOD BY AUTOMATED COUNT: 13.2 % (ref 11.5–14.5)
FRACTIONAL SHORTENING: 32.5 % (ref 28–44)
GFR SERPLBLD CREATININE-BSD FMLA CKD-EPI: >60 ML/MIN/1.73/M2
GLUCOSE SERPL-MCNC: 84 MG/DL (ref 70–110)
HCT VFR BLD AUTO: 40.5 % (ref 37–48.5)
HGB BLD-MCNC: 13 GM/DL (ref 12–16)
IMM GRANULOCYTES # BLD AUTO: 0.03 K/UL (ref 0–0.04)
IMM GRANULOCYTES NFR BLD AUTO: 0.3 % (ref 0–0.5)
INDIRECT COOMBS: NORMAL
INTERVENTRICULAR SEPTUM: 0.8 CM (ref 0.6–1.1)
IVRT: 63 MS
LA MAJOR: 6.4 CM
LA MINOR: 6.4 CM
LA WIDTH: 3.4 CM
LEFT ATRIUM SIZE: 3.9 CM
LEFT ATRIUM VOLUME INDEX MOD: 31 ML/M2
LEFT ATRIUM VOLUME INDEX: 36 ML/M2
LEFT ATRIUM VOLUME MOD: 62 ML
LEFT ATRIUM VOLUME: 72 CM3
LEFT INTERNAL DIMENSION IN SYSTOLE: 2.7 CM (ref 2.1–4)
LEFT VENTRICLE DIASTOLIC VOLUME INDEX: 33.66 ML/M2
LEFT VENTRICLE DIASTOLIC VOLUME: 68 ML
LEFT VENTRICLE MASS INDEX: 50.2 G/M2
LEFT VENTRICLE SYSTOLIC VOLUME INDEX: 13.4 ML/M2
LEFT VENTRICLE SYSTOLIC VOLUME: 27 ML
LEFT VENTRICULAR INTERNAL DIMENSION IN DIASTOLE: 4 CM (ref 3.5–6)
LEFT VENTRICULAR MASS: 101.4 G
LV LATERAL E/E' RATIO: 7.4 M/S
LV SEPTAL E/E' RATIO: 8.9 M/S
LYMPHOCYTES # BLD AUTO: 2.97 K/UL (ref 1–4.8)
MAGNESIUM SERPL-MCNC: 1.7 MG/DL (ref 1.6–2.6)
MCH RBC QN AUTO: 32.9 PG (ref 27–31)
MCHC RBC AUTO-ENTMCNC: 32.1 G/DL (ref 32–36)
MCV RBC AUTO: 103 FL (ref 82–98)
MV PEAK E VEL: 1.25 M/S
NUCLEATED RBC (/100WBC) (OHS): 0 /100 WBC
OHS CV RV/LV RATIO: 0.73 CM
OHS LV EJECTION FRACTION SIMPSONS BIPLANE MOD: 40 %
PHOSPHATE SERPL-MCNC: 4.4 MG/DL (ref 2.7–4.5)
PISA TR MAX VEL: 2.5 M/S
PLATELET # BLD AUTO: 177 K/UL (ref 150–450)
PMV BLD AUTO: 12.2 FL (ref 9.2–12.9)
POTASSIUM SERPL-SCNC: 4.5 MMOL/L (ref 3.5–5.1)
PROT SERPL-MCNC: 5.7 GM/DL (ref 6–8.4)
RA MAJOR: 6.39 CM
RA PRESSURE ESTIMATED: 0 MMHG
RA WIDTH: 3.54 CM
RBC # BLD AUTO: 3.95 M/UL (ref 4–5.4)
RELATIVE EOSINOPHIL (OHS): 2.1 %
RELATIVE LYMPHOCYTE (OHS): 28.2 % (ref 18–48)
RELATIVE MONOCYTE (OHS): 13.3 % (ref 4–15)
RELATIVE NEUTROPHIL (OHS): 55.2 % (ref 38–73)
RH BLD: NORMAL
RIGHT VENTRICLE DIASTOLIC BASEL DIMENSION: 2.9 CM
RV TB RVSP: 3 MMHG
RV TISSUE DOPPLER FREE WALL SYSTOLIC VELOCITY 1 (APICAL 4 CHAMBER VIEW): 20.3 CM/S
SINUS: 2.68 CM
SODIUM SERPL-SCNC: 136 MMOL/L (ref 136–145)
SPECIMEN OUTDATE: NORMAL
SPECIMEN OUTDATE: NORMAL
STJ: 2.37 CM
TDI LATERAL: 0.17 M/S
TDI SEPTAL: 0.14 M/S
TDI: 0.16 M/S
TRICUSPID ANNULAR PLANE SYSTOLIC EXCURSION: 1.01 CM
TV PEAK GRADIENT: 24 MMHG
TV REST PULMONARY ARTERY PRESSURE: 25 MMHG
WBC # BLD AUTO: 10.53 K/UL (ref 3.9–12.7)
Z-SCORE OF LEFT VENTRICULAR DIMENSION IN END DIASTOLE: -3.97
Z-SCORE OF LEFT VENTRICULAR DIMENSION IN END SYSTOLE: -2.38

## 2025-04-13 PROCEDURE — 84100 ASSAY OF PHOSPHORUS: CPT | Performed by: STUDENT IN AN ORGANIZED HEALTH CARE EDUCATION/TRAINING PROGRAM

## 2025-04-13 PROCEDURE — 86900 BLOOD TYPING SEROLOGIC ABO: CPT | Mod: 91 | Performed by: STUDENT IN AN ORGANIZED HEALTH CARE EDUCATION/TRAINING PROGRAM

## 2025-04-13 PROCEDURE — 80053 COMPREHEN METABOLIC PANEL: CPT | Performed by: STUDENT IN AN ORGANIZED HEALTH CARE EDUCATION/TRAINING PROGRAM

## 2025-04-13 PROCEDURE — 36415 COLL VENOUS BLD VENIPUNCTURE: CPT | Performed by: STUDENT IN AN ORGANIZED HEALTH CARE EDUCATION/TRAINING PROGRAM

## 2025-04-13 PROCEDURE — 83735 ASSAY OF MAGNESIUM: CPT | Performed by: STUDENT IN AN ORGANIZED HEALTH CARE EDUCATION/TRAINING PROGRAM

## 2025-04-13 PROCEDURE — 85025 COMPLETE CBC W/AUTO DIFF WBC: CPT | Performed by: STUDENT IN AN ORGANIZED HEALTH CARE EDUCATION/TRAINING PROGRAM

## 2025-04-13 PROCEDURE — 25000003 PHARM REV CODE 250: Performed by: HOSPITALIST

## 2025-04-13 PROCEDURE — 21400001 HC TELEMETRY ROOM

## 2025-04-13 PROCEDURE — 86850 RBC ANTIBODY SCREEN: CPT | Mod: 91 | Performed by: STUDENT IN AN ORGANIZED HEALTH CARE EDUCATION/TRAINING PROGRAM

## 2025-04-13 PROCEDURE — 86905 BLOOD TYPING RBC ANTIGENS: CPT | Performed by: STUDENT IN AN ORGANIZED HEALTH CARE EDUCATION/TRAINING PROGRAM

## 2025-04-13 PROCEDURE — 94761 N-INVAS EAR/PLS OXIMETRY MLT: CPT

## 2025-04-13 PROCEDURE — 25000003 PHARM REV CODE 250: Performed by: STUDENT IN AN ORGANIZED HEALTH CARE EDUCATION/TRAINING PROGRAM

## 2025-04-13 PROCEDURE — 86870 RBC ANTIBODY IDENTIFICATION: CPT | Performed by: STUDENT IN AN ORGANIZED HEALTH CARE EDUCATION/TRAINING PROGRAM

## 2025-04-13 PROCEDURE — 86901 BLOOD TYPING SEROLOGIC RH(D): CPT | Mod: 91 | Performed by: STUDENT IN AN ORGANIZED HEALTH CARE EDUCATION/TRAINING PROGRAM

## 2025-04-13 PROCEDURE — 86901 BLOOD TYPING SEROLOGIC RH(D): CPT | Performed by: STUDENT IN AN ORGANIZED HEALTH CARE EDUCATION/TRAINING PROGRAM

## 2025-04-13 RX ORDER — PROMETHAZINE HYDROCHLORIDE AND CODEINE PHOSPHATE 6.25; 1 MG/5ML; MG/5ML
5 SOLUTION ORAL EVERY 4 HOURS PRN
Status: DISCONTINUED | OUTPATIENT
Start: 2025-04-13 | End: 2025-04-15 | Stop reason: HOSPADM

## 2025-04-13 RX ORDER — ACYCLOVIR 800 MG/1
800 TABLET ORAL
Status: DISCONTINUED | OUTPATIENT
Start: 2025-04-13 | End: 2025-04-15 | Stop reason: HOSPADM

## 2025-04-13 RX ADMIN — ACYCLOVIR 800 MG: 800 TABLET ORAL at 10:04

## 2025-04-13 RX ADMIN — PROMETHAZINE HYDROCHLORIDE AND CODEINE PHOSPHATE 5 ML: 6.25; 1 SOLUTION ORAL at 10:04

## 2025-04-13 RX ADMIN — METOPROLOL TARTRATE 50 MG: 50 TABLET, FILM COATED ORAL at 06:04

## 2025-04-13 RX ADMIN — ACYCLOVIR 800 MG: 800 TABLET ORAL at 12:04

## 2025-04-13 RX ADMIN — LEVOTHYROXINE SODIUM 150 MCG: 150 TABLET ORAL at 06:04

## 2025-04-13 RX ADMIN — ACYCLOVIR 800 MG: 800 TABLET ORAL at 02:04

## 2025-04-13 RX ADMIN — METOPROLOL TARTRATE 50 MG: 50 TABLET, FILM COATED ORAL at 12:04

## 2025-04-13 RX ADMIN — APIXABAN 5 MG: 5 TABLET, FILM COATED ORAL at 09:04

## 2025-04-13 RX ADMIN — METOPROLOL TARTRATE 50 MG: 50 TABLET, FILM COATED ORAL at 10:04

## 2025-04-13 RX ADMIN — ACYCLOVIR 800 MG: 800 TABLET ORAL at 05:04

## 2025-04-13 RX ADMIN — APIXABAN 5 MG: 5 TABLET, FILM COATED ORAL at 08:04

## 2025-04-13 RX ADMIN — ALPRAZOLAM 0.25 MG: 0.25 TABLET ORAL at 10:04

## 2025-04-13 NOTE — CARE UPDATE
"RAPID RESPONSE NURSE CHART REVIEW        Chart Reviewed: 04/12/2025 9:30 PM    MRN: [id number]  Bed: 641/641 A    Dx: Typical atrial flutter    Beth Cantu has a past medical history of Absence of both cervix and uterus, acquired, Basal cell carcinoma (BCC), Breast cancer, Breast cyst, Cancer, H/O bone density study, H/O colonoscopy, H/O mammogram, Herpes simplex virus (HSV) infection, Hypothyroidism associated with surgical procedure, Migraines, MVP (mitral valve prolapse), Osteoarthritis, Overweight, Thyroid disease, and Vaginal Pap smear.    Last VS: BP (!) 97/52   Pulse (!) 125   Temp 97.6 °F (36.4 °C)   Resp 18   Ht 5' 7" (1.702 m)   Wt 90.7 kg (199 lb 15.3 oz)   LMP 02/17/1986 (Approximate) Comment: TVH 1986  SpO2 99%   Breastfeeding No   BMI 31.32 kg/m²     24H Vital Sign Range:  Temp:  [97.4 °F (36.3 °C)-98.6 °F (37 °C)]   Pulse:  [124-133]   Resp:  [17-24]   BP: ()/(52-91)   SpO2:  [95 %-100 %]     Level of Consciousness (AVPU): alert    Recent Labs     04/11/25  1335 04/12/25  0315   WBC 8.84 9.73   HGB 14.9 14.2   HCT 45.6 43.7    203       Recent Labs     04/11/25  1335 04/12/25  0427    138   K 4.8 4.1    107   CO2 25 23   BUN 14 14   CREATININE 0.8 0.7   MG 1.9  --         OXYGEN:      MEWS score: 3    Rounding completed with bedside nurse Simona for tachycardia reports patient with persistent tachycardia (120-130's~Aflutter), otherwise asymptomatic/VSS. Patient is scheduled to cardioversion vs. ablation on Monday and is currently anticoagulated pending procedure. No additional concerns verbalized at this time. Instructed to call [phone] for further concerns or assistance.    Tawnya Jessica RN      "

## 2025-04-13 NOTE — ASSESSMENT & PLAN NOTE
75yoF with a longstanding hx of mitral valve prolapse (sees cardiology at [facility]), Br CA (low risk, on RT and hormone therapy), hypothyroidism, degenerative joint disease s/p multiple joint replacements who is here in new typical atrial flutter. EP is consulted for AFL.    This flutter has likely been going on for 2-3 weeks. She is having dyspnea on exertion with mildly elevated BNP and likely has mild volume overload in the setting of poorly rate controlled AFL.     Recommendations:  - Diurese to euvolemia with the help of gen cards if needed  - New TTE today, would like to see EF and mitral valve (hx of MVP)  - Continue Eliquis  - Continue Lopressor  - NPO at midnight, plan for CTI ablation tomorrow

## 2025-04-13 NOTE — SUBJECTIVE & OBJECTIVE
Interval History: Patient feels well. Still in typical atrial flutter, rates fixed in the 120s on telemetry. She is minimally symptomatic (RICKS).    Review of Systems   Constitutional: Negative for diaphoresis and fever.   Cardiovascular:  Positive for dyspnea on exertion. Negative for chest pain, leg swelling, near-syncope, orthopnea, palpitations, paroxysmal nocturnal dyspnea and syncope.   Respiratory:  Negative for cough and shortness of breath.    Gastrointestinal:  Negative for abdominal pain, diarrhea, nausea and vomiting.   Neurological:  Negative for light-headedness.   Psychiatric/Behavioral:  Negative for altered mental status and substance abuse.      Objective:     Vital Signs (Most Recent):  Temp: 97.7 °F (36.5 °C) (04/13/25 0830)  Pulse: (!) 129 (04/13/25 0830)  Resp: 20 (04/13/25 0830)  BP: (!) 112/56 (04/13/25 0830)  SpO2: 96 % (04/13/25 0830) Vital Signs (24h Range):  Temp:  [97.4 °F (36.3 °C)-97.8 °F (36.6 °C)] 97.7 °F (36.5 °C)  Pulse:  [124-133] 129  Resp:  [17-22] 20  SpO2:  [95 %-100 %] 96 %  BP: ()/(52-91) 112/56     Weight: 90.7 kg (199 lb 15.3 oz)  Body mass index is 31.32 kg/m².     SpO2: 96 %        Physical Exam  Constitutional:       Appearance: Normal appearance.   Cardiovascular:      Rate and Rhythm: Regular rhythm. Tachycardia present.      Pulses: Normal pulses.      Heart sounds: Normal heart sounds.   Pulmonary:      Effort: Pulmonary effort is normal.      Breath sounds: Normal breath sounds. No wheezing or rales.   Abdominal:      General: Abdomen is flat. There is no distension.      Palpations: Abdomen is soft.      Tenderness: There is no abdominal tenderness.   Musculoskeletal:      Right lower leg: No edema.      Left lower leg: No edema.   Skin:     General: Skin is warm and dry.   Neurological:      Mental Status: She is alert and oriented to person, place, and time. Mental status is at baseline.   Psychiatric:         Mood and Affect: Mood normal.         Behavior:  Behavior normal.            Significant Labs: All pertinent lab results from the last 24 hours have been reviewed.    Significant Imaging:  Reviewed

## 2025-04-13 NOTE — ASSESSMENT & PLAN NOTE
Patient presenting from clinic with tachycardia.  Smart watch reported episodes of notable tachycardia in January in February, but sustained episodes of tachycardia since March.    - EKG in the ED with atrial flutter 2:1 in the 120s  - Not responsive to IV metoprolol or diltiazem  - TSH normal   - , troponin negative  - Cardiology consulted   -- Continue PO metoprolol, titrate to heart rate goal <110  -- Apixaban for anticoagulation  -- TTE completed  - EP consulted  -- Ablation planned 4/14, NPO at [facility]  - Telemetry

## 2025-04-13 NOTE — ASSESSMENT & PLAN NOTE
- h/o dating back to 2016 on chart review  - rash patient noted starting just prior to presentation consistent HSV outbreak  - Acyclovir x5 daily x7 days, sot 4/13

## 2025-04-13 NOTE — PROGRESS NOTES
[facility] S[facility]  Cardiac Electrophysiology  Progress Note    Admission Date: 4/11/2025  Code Status: Full Code   Attending Physician: Arnulfo Sears MD   Expected Discharge Date: 4/15/2025  Principal Problem:Typical atrial flutter    Subjective:     Interval History: Patient feels well. Still in typical atrial flutter, rates fixed in the 120s on telemetry. She is minimally symptomatic (RICKS).    Review of Systems   Constitutional: Negative for diaphoresis and fever.   Cardiovascular:  Positive for dyspnea on exertion. Negative for chest pain, leg swelling, near-syncope, orthopnea, palpitations, paroxysmal nocturnal dyspnea and syncope.   Respiratory:  Negative for cough and shortness of breath.    Gastrointestinal:  Negative for abdominal pain, diarrhea, nausea and vomiting.   Neurological:  Negative for light-headedness.   Psychiatric/Behavioral:  Negative for altered mental status and substance abuse.      Objective:     Vital Signs (Most Recent):  Temp: 97.7 °F (36.5 °C) (04/13/25 0830)  Pulse: (!) 129 (04/13/25 0830)  Resp: 20 (04/13/25 0830)  BP: (!) 112/56 (04/13/25 0830)  SpO2: 96 % (04/13/25 0830) Vital Signs (24h Range):  Temp:  [97.4 °F (36.3 °C)-97.8 °F (36.6 °C)] 97.7 °F (36.5 °C)  Pulse:  [124-133] 129  Resp:  [17-22] 20  SpO2:  [95 %-100 %] 96 %  BP: ()/(52-91) 112/56     Weight: 90.7 kg (199 lb 15.3 oz)  Body mass index is 31.32 kg/m².     SpO2: 96 %        Physical Exam  Constitutional:       Appearance: Normal appearance.   Cardiovascular:      Rate and Rhythm: Regular rhythm. Tachycardia present.      Pulses: Normal pulses.      Heart sounds: Normal heart sounds.   Pulmonary:      Effort: Pulmonary effort is normal.      Breath sounds: Normal breath sounds. No wheezing or rales.   Abdominal:      General: Abdomen is flat. There is no distension.      Palpations: Abdomen is soft.      Tenderness: There is no abdominal tenderness.   Musculoskeletal:      Right lower leg: No edema.       Left lower leg: No edema.   Skin:     General: Skin is warm and dry.   Neurological:      Mental Status: She is alert and oriented to person, place, and time. Mental status is at baseline.   Psychiatric:         Mood and Affect: Mood normal.         Behavior: Behavior normal.            Significant Labs: All pertinent lab results from the last 24 hours have been reviewed.    Significant Imaging:  Reviewed  Assessment and Plan:     * Typical atrial flutter  75yoF with a longstanding hx of mitral valve prolapse (sees cardiology at [facility]), Br CA (low risk, on RT and hormone therapy), hypothyroidism, degenerative joint disease s/p multiple joint replacements who is here in new typical atrial flutter. EP is consulted for AFL.    This flutter has likely been going on for 2-3 weeks. She is having dyspnea on exertion with mildly elevated BNP and likely has mild volume overload in the setting of poorly rate controlled AFL.     Recommendations:  - Diurese to euvolemia with the help of gen cards if needed  - New TTE today, would like to see EF and mitral valve (hx of MVP)  - Continue Eliquis  - Continue Lopressor  - NPO at midnight, plan for CTI ablation tomorrow        Connor M Gillies, DO  Cardiac Electrophysiology  [facility] - [facility] S[facility]

## 2025-04-13 NOTE — CARE UPDATE
"RAPID RESPONSE NURSE CHART REVIEW        Chart Reviewed: 04/13/2025, 7:19 AM    MRN: [id number]  Bed: 641/641 A    Dx: Typical atrial flutter    Beth Cantu has a past medical history of Absence of both cervix and uterus, acquired, Basal cell carcinoma (BCC), Breast cancer, Breast cyst, Cancer, H/O bone density study, H/O colonoscopy, H/O mammogram, Herpes simplex virus (HSV) infection, Hypothyroidism associated with surgical procedure, Migraines, MVP (mitral valve prolapse), Osteoarthritis, Overweight, Thyroid disease, and Vaginal Pap smear.    Last VS: BP (!) 91/56   Pulse (!) 124   Temp 97.8 °F (36.6 °C)   Resp 18   Ht 5' 7" (1.702 m)   Wt 90.7 kg (199 lb 15.3 oz)   LMP 02/17/1986 (Approximate) Comment: TVH 1986  SpO2 98%   Breastfeeding No   BMI 31.32 kg/m²     24H Vital Sign Range:  Temp:  [97.4 °F (36.3 °C)-97.8 °F (36.6 °C)]   Pulse:  [124-133]   Resp:  [17-22]   BP: ()/(52-91)   SpO2:  [95 %-100 %]     Level of Consciousness (AVPU): alert    Recent Labs     04/11/25  1335 04/12/25  0315 04/13/25  0325   WBC 8.84 9.73 10.53   HGB 14.9 14.2 13.0   HCT 45.6 43.7 40.5    203 177       Recent Labs     04/11/25  1335 04/12/25  0427 04/13/25  0325    138 136   K 4.8 4.1 4.5    107 108   CO2 25 23 18*   BUN 14 14 15   CREATININE 0.8 0.7 0.7   PHOS  --   --  4.4   MG 1.9  --  1.7          MEWS score: 4    Rounding completed at 0914.    Rounding completed with charge nurse Ny for tachycardia reports patient remains in aflutter; rate 120s, BP stable. No acute concerns verbalized at this time. Instructed to call [phone] for further concerns or assistance.    Chen Delgado RN      "

## 2025-04-13 NOTE — SUBJECTIVE & OBJECTIVE
Interval History:   No events overnight. EP following. Patient and family noted rash on bottom present for past 5 days.       Review of Systems   All other systems reviewed and are negative.    Objective:     Vital Signs (Most Recent):  Temp: 97.7 °F (36.5 °C) (04/13/25 1217)  Pulse: (!) 131 (04/13/25 1226)  Resp: 18 (04/13/25 1217)  BP: (!) 119/59 (04/13/25 1226)  SpO2: 96 % (04/13/25 1217) Vital Signs (24h Range):  Temp:  [97.4 °F (36.3 °C)-97.8 °F (36.6 °C)] 97.7 °F (36.5 °C)  Pulse:  [124-133] 131  Resp:  [17-20] 18  SpO2:  [95 %-100 %] 96 %  BP: ()/(52-91) 119/59     Weight: 90.3 kg (199 lb)  Body mass index is 31.17 kg/m².    Intake/Output Summary (Last 24 hours) at 4/13/2025 1623  Last data filed at 4/12/2025 1957  Gross per 24 hour   Intake 120 ml   Output --   Net 120 ml         Physical Exam  Constitutional:       General: She is not in acute distress.     Appearance: Normal appearance.   HENT:      Head: Normocephalic and atraumatic.      Mouth/Throat:      Mouth: Mucous membranes are moist.   Eyes:      Extraocular Movements: Extraocular movements intact.      Conjunctiva/sclera: Conjunctivae normal.   Cardiovascular:      Rate and Rhythm: Regular rhythm. Tachycardia present.   Pulmonary:      Effort: Pulmonary effort is normal. No respiratory distress.      Breath sounds: Normal breath sounds. No wheezing or rales.   Abdominal:      General: Abdomen is flat. Bowel sounds are normal.      Palpations: Abdomen is soft.      Tenderness: There is no abdominal tenderness. There is no guarding.   Musculoskeletal:         General: No swelling or tenderness.      Right lower leg: No edema.      Left lower leg: No edema.   Skin:     Findings: Rash (medial left buttock clustered clear fluid filled blisters (see media)) present.   Neurological:      General: No focal deficit present.      Mental Status: She is alert and oriented to person, place, and time. Mental status is at baseline.   Psychiatric:          Mood and Affect: Mood normal.         Behavior: Behavior normal.               Significant Labs: All pertinent labs within the past 24 hours have been reviewed.  CBC:   Recent Labs   Lab 04/12/25  0315 04/13/25  0325   WBC 9.73 10.53   HGB 14.2 13.0   HCT 43.7 40.5    177     CMP:   Recent Labs   Lab 04/12/25  0427 04/13/25  0325    136   K 4.1 4.5    108   CO2 23 18*   BUN 14 15   CREATININE 0.7 0.7   CALCIUM 7.9* 8.2*   ALBUMIN 3.3* 3.2*   BILITOT 0.6 0.6   ALKPHOS 84 82   AST 16 21   ALT 23 21   ANIONGAP 8 10       Significant Imaging: I have reviewed all pertinent imaging results/findings within the past 24 hours.

## 2025-04-13 NOTE — PROGRESS NOTES
[facility] Medicine  Progress Note    Patient Name: Beth Cantu  MRN: [id number]  Patient Class: IP- Inpatient   Admission Date: 4/11/2025  Length of Stay: 2 days  Attending Physician: Arnulfo Sears MD  Primary Care Provider: Bruce Artis MD        Subjective     Principal Problem:Typical atrial flutter        HPI:  Mrs. Cantu is a 75-year-old female with a past medical history of breast cancer, hypothyroidism, hip replacement, mitral valve prolapse who presents from clinic with tachycardia.  Patient reports her smart watch has been showing heart rates elevated periodically in January and February of this year, but has been consistently show heart rates in the mid 100s since March.  Patient was recently treated for upper respiratory infection where she had brief episode of dyspnea.  But denies dyspnea, chest pain, lightheadedness, medication changes, dizziness, palpitations, nausea, presyncope, bilateral lower extremity swelling.  Patient was noted to have heart rate in the 160s in clinic and sent to the ED for further evaluation.  Denies previous episodes of abnormal heart rhythms.    In the ED, , /69, afebrile, and on room air.  Labs notable for TSH 3.4, troponin normal, unremarkable BNP/CBC, and unremarkable chest x-ray.  Patient given 5 mg IV metoprolol and 10 mg IV diltiazem without improvement of heart rate.    Overview/Hospital Course:  No notes on file    Interval History:   No events overnight. EP following. Patient and family noted rash on bottom present for past 5 days.       Review of Systems   All other systems reviewed and are negative.    Objective:     Vital Signs (Most Recent):  Temp: 97.7 °F (36.5 °C) (04/13/25 1217)  Pulse: (!) 131 (04/13/25 1226)  Resp: 18 (04/13/25 1217)  BP: (!) 119/59 (04/13/25 1226)  SpO2: 96 % (04/13/25 1217) Vital Signs (24h Range):  Temp:  [97.4 °F (36.3 °C)-97.8 °F (36.6 °C)] 97.7 °F (36.5 °C)  Pulse:  [124-133]  131  Resp:  [17-20] 18  SpO2:  [95 %-100 %] 96 %  BP: ()/(52-91) 119/59     Weight: 90.3 kg (199 lb)  Body mass index is 31.17 kg/m².    Intake/Output Summary (Last 24 hours) at 4/13/2025 1623  Last data filed at 4/12/2025 1957  Gross per 24 hour   Intake 120 ml   Output --   Net 120 ml         Physical Exam  Constitutional:       General: She is not in acute distress.     Appearance: Normal appearance.   HENT:      Head: Normocephalic and atraumatic.      Mouth/Throat:      Mouth: Mucous membranes are moist.   Eyes:      Extraocular Movements: Extraocular movements intact.      Conjunctiva/sclera: Conjunctivae normal.   Cardiovascular:      Rate and Rhythm: Regular rhythm. Tachycardia present.   Pulmonary:      Effort: Pulmonary effort is normal. No respiratory distress.      Breath sounds: Normal breath sounds. No wheezing or rales.   Abdominal:      General: Abdomen is flat. Bowel sounds are normal.      Palpations: Abdomen is soft.      Tenderness: There is no abdominal tenderness. There is no guarding.   Musculoskeletal:         General: No swelling or tenderness.      Right lower leg: No edema.      Left lower leg: No edema.   Skin:     Findings: Rash (medial left buttock clustered clear fluid filled blisters (see media)) present.   Neurological:      General: No focal deficit present.      Mental Status: She is alert and oriented to person, place, and time. Mental status is at baseline.   Psychiatric:         Mood and Affect: Mood normal.         Behavior: Behavior normal.               Significant Labs: All pertinent labs within the past 24 hours have been reviewed.  CBC:   Recent Labs   Lab 04/12/25  0315 04/13/25  0325   WBC 9.73 10.53   HGB 14.2 13.0   HCT 43.7 40.5    177     CMP:   Recent Labs   Lab 04/12/25  0427 04/13/25  0325    136   K 4.1 4.5    108   CO2 23 18*   BUN 14 15   CREATININE 0.7 0.7   CALCIUM 7.9* 8.2*   ALBUMIN 3.3* 3.2*   BILITOT 0.6 0.6   ALKPHOS 84 82   AST  16 21   ALT 23 21   ANIONGAP 8 10       Significant Imaging: I have reviewed all pertinent imaging results/findings within the past 24 hours.      Assessment & Plan  Typical atrial flutter  Patient presenting from clinic with tachycardia.  Smart watch reported episodes of notable tachycardia in January in February, but sustained episodes of tachycardia since March.    - EKG in the ED with atrial flutter 2:1 in the 120s  - Not responsive to IV metoprolol or diltiazem  - TSH normal   - , troponin negative  - Cardiology consulted   -- Continue PO metoprolol, titrate to heart rate goal <110  -- Apixaban for anticoagulation  -- TTE completed  - EP consulted  -- Ablation planned 4/14, NPO at [facility]  - Telemetry    Hypothyroidism associated with surgical procedure  - Home levothyroxine    Invasive lobular carcinoma of breast in female  - Follows with Oncology outpatient    Anxiety  - Home Xanax PRN    Herpes simplex virus (HSV) infection  - h/o dating back to 2016 on chart review  - rash patient noted starting just prior to presentation consistent HSV outbreak  - Acyclovir x5 daily x7 days, sot 4/13        VTE Risk Mitigation (From admission, onward)           Ordered     apixaban tablet 5 mg  2 times daily         04/13/25 1459     apixaban tablet 5 mg  Once         04/13/25 1459     IP VTE HIGH RISK PATIENT  Once         04/11/25 1548     Place sequential compression device  Until discontinued         04/11/25 1548                    Discharge Planning   STEFANIE: 4/15/2025     Code Status: Full Code   Medical Readiness for Discharge Date:   Discharge Plan A: Home                        Arnulfo Sears MD  Department of Hospital Medicine   [facility] Surg

## 2025-04-13 NOTE — PLAN OF CARE
[facility]  Initial Discharge Assessment       Primary Care Provider: Bruce Artis MD    Admission Diagnosis: Tachycardia [R00.0]  Typical atrial flutter [I48.3]    Admission Date: 4/11/2025  Expected Discharge Date: 4/15/2025    Transition of Care Barriers: None    Payor: MEDICARE / Plan: MEDICARE PART A & B / Product Type: Government /     Extended Emergency Contact Information  Primary Emergency Contact: Klaus Cantu  Address: [address] [place] [address] [facility]  Home Phone: [phone]  Mobile Phone: [phone]  Relation: Spouse  Secondary Emergency Contact: Luis Cantu  Mobile Phone: [phone]  Relation: Son    Discharge Plan A: Home  Discharge Plan B: Home with family      [facility] - [facility] [address]  [address]  [facility] [address]  Phone: [phone] Fax: [phone]    [facility] - [place] [place] [facility] [address] [facility]  [address] [facility] [address]  Phone: [phone] Fax: [phone]    [facility] [place]  [address] [address]  Phone: [phone] Fax: [phone]    [facility] - [place], [place] - [address]  [address]  [place] [address]  Phone: [phone] Fax: [phone]      Initial Assessment (most recent)       Adult Discharge Assessment - 04/13/25 1310          Discharge Assessment    Assessment Type Discharge Planning Assessment     Confirmed/corrected address, phone number and insurance Yes     Confirmed Demographics Correct on Facesheet     Source of Information patient;family     Communicated STEFANIE with patient/caregiver Yes     Reason For Admission Typical atrial flutter     People in Home spouse     Facility Arrived From: home     Do you expect to return to your current living situation? Yes     Do you have help at home or someone to help you manage your care at home? Yes     Who are your caregiver(s)  and their phone number(s)? spouse Klaus     Prior to hospitilization cognitive status: Alert/Oriented     Current cognitive status: Alert/Oriented     Walking or Climbing Stairs Difficulty no     Dressing/Bathing Difficulty no     Home Accessibility wheelchair accessible     Home Layout Able to live on 1st floor     Equipment Currently Used at Home bedside commode;walker, rolling     Readmission within 30 days? No     Patient currently being followed by outpatient case management? No     Do you currently have service(s) that help you manage your care at home? No     Do you take prescription medications? Yes     Do you have prescription coverage? Yes     Coverage Medicare     Do you have any problems affording any of your prescribed medications? TBD     Is the patient taking medications as prescribed? yes     Who is going to help you get home at discharge? Spouse     How do you get to doctors appointments? car, drives self;family or friend will provide     Are you on dialysis? No     Do you take coumadin? No     Discharge Plan A Home     Discharge Plan B Home with family     DME Needed Upon Discharge  none     Discharge Plan discussed with: Spouse/sig other;Patient     Name(s) and Number(s) Klaus     Transition of Care Barriers None        Physical Activity    On average, how many days per week do you engage in moderate to strenuous exercise (like a brisk walk)? 4 days     On average, how many minutes do you engage in exercise at this level? 30 min        Financial Resource Strain    How hard is it for you to pay for the very basics like food, housing, medical care, and heating? Not hard at all        Housing Stability    In the last 12 months, was there a time when you were not able to pay the mortgage or rent on time? No     At any time in the past 12 months, were you homeless or living in a shelter (including now)? No        Transportation Needs    In the past 12 months, has lack of transportation kept you from  medical appointments or from getting medications? No     In the past 12 months, has lack of transportation kept you from meetings, work, or from getting things needed for daily living? No        Food Insecurity    Within the past 12 months, you worried that your food would run out before you got the money to buy more. Never true     Within the past 12 months, the food you bought just didn't last and you didn't have money to get more. Never true        Stress    Do you feel stress - tense, restless, nervous, or anxious, or unable to sleep at night because your mind is troubled all the time - these days? Not at all        Social Isolation    How often do you feel lonely or isolated from those around you?  Never        Alcohol Use    Q1: How often do you have a drink containing alcohol? Monthly or less     Q2: How many drinks containing alcohol do you have on a typical day when you are drinking? 1 or 2        Utilities    In the past 12 months has the electric, gas, oil, or water company threatened to shut off services in your home? No        Health Literacy    How often do you need to have someone help you when you read instructions, pamphlets, or other written material from your doctor or pharmacy? Never                        Discharge Plan A and Plan B have been determined by review of patient's clinical status, future medical and therapeutic needs, and coverage/benefits for post-acute care in coordination with multidisciplinary team members.     CM spoke with patient in Room at bedside. All information was verified on facesheet. Patient lives in a 1 story house with spouse Klaus [phone], 1 step to get inside with no pets.  Patient had recent hip replacement just finished out patient therapy last week, has RW, BSC at home to assist with ambulation. States used [facility] in the past and ok with going back with same company of needed.   Patient is not on dialysis nor use Coumadin as a blood thinner. Patient takes  medication as prescribed and has resources for all prescriptive needs.   Patient will have help from family member upon discharge. Family will provide transportation home.  Discharge booklet given to patient at bedside.  All Questions and concerns addressed and whiteboard updated with CM contact information. Will continue to follow for course of hospitalization.     Nova Silverman RN  Case Management  [phone]          DISPLAY PLAN FREE TEXT

## 2025-04-14 ENCOUNTER — ANESTHESIA (OUTPATIENT)
Dept: MEDSURG UNIT | Facility: HOSPITAL | Age: 75
End: 2025-04-14
Payer: MEDICARE

## 2025-04-14 ENCOUNTER — ANESTHESIA EVENT (OUTPATIENT)
Dept: MEDSURG UNIT | Facility: HOSPITAL | Age: 75
End: 2025-04-14
Payer: MEDICARE

## 2025-04-14 LAB
A1 AG RBC QL: NORMAL
ABSOLUTE EOSINOPHIL (OHS): 0.24 K/UL
ABSOLUTE MONOCYTE (OHS): 0.96 K/UL (ref 0.3–1)
ABSOLUTE NEUTROPHIL COUNT (OHS): 4.79 K/UL (ref 1.8–7.7)
ALBUMIN SERPL BCP-MCNC: 3.1 G/DL (ref 3.5–5.2)
ALP SERPL-CCNC: 79 UNIT/L (ref 40–150)
ALT SERPL W/O P-5'-P-CCNC: 19 UNIT/L (ref 10–44)
ANION GAP (OHS): 11 MMOL/L (ref 8–16)
AST SERPL-CCNC: 15 UNIT/L (ref 11–45)
BASOPHILS # BLD AUTO: 0.06 K/UL
BASOPHILS NFR BLD AUTO: 0.7 %
BILIRUB SERPL-MCNC: 0.5 MG/DL (ref 0.1–1)
BUN SERPL-MCNC: 13 MG/DL (ref 8–23)
CALCIUM SERPL-MCNC: 8.2 MG/DL (ref 8.7–10.5)
CHLORIDE SERPL-SCNC: 105 MMOL/L (ref 95–110)
CO2 SERPL-SCNC: 20 MMOL/L (ref 23–29)
CREAT SERPL-MCNC: 0.7 MG/DL (ref 0.5–1.4)
ERYTHROCYTE [DISTWIDTH] IN BLOOD BY AUTOMATED COUNT: 13.1 % (ref 11.5–14.5)
GFR SERPLBLD CREATININE-BSD FMLA CKD-EPI: >60 ML/MIN/1.73/M2
GLUCOSE SERPL-MCNC: 87 MG/DL (ref 70–110)
HCT VFR BLD AUTO: 40.5 % (ref 37–48.5)
HGB BLD-MCNC: 13 GM/DL (ref 12–16)
IMM GRANULOCYTES # BLD AUTO: 0.04 K/UL (ref 0–0.04)
IMM GRANULOCYTES NFR BLD AUTO: 0.5 % (ref 0–0.5)
LYMPHOCYTES # BLD AUTO: 2.48 K/UL (ref 1–4.8)
MAGNESIUM SERPL-MCNC: 1.6 MG/DL (ref 1.6–2.6)
MCH RBC QN AUTO: 32.6 PG (ref 27–31)
MCHC RBC AUTO-ENTMCNC: 32.1 G/DL (ref 32–36)
MCV RBC AUTO: 102 FL (ref 82–98)
NUCLEATED RBC (/100WBC) (OHS): 0 /100 WBC
PHOSPHATE SERPL-MCNC: 4.8 MG/DL (ref 2.7–4.5)
PLATELET # BLD AUTO: 180 K/UL (ref 150–450)
PMV BLD AUTO: 11.7 FL (ref 9.2–12.9)
POCT GLUCOSE: 107 MG/DL (ref 70–110)
POTASSIUM SERPL-SCNC: 4.3 MMOL/L (ref 3.5–5.1)
PROT SERPL-MCNC: 5.5 GM/DL (ref 6–8.4)
RBC # BLD AUTO: 3.99 M/UL (ref 4–5.4)
RELATIVE EOSINOPHIL (OHS): 2.8 %
RELATIVE LYMPHOCYTE (OHS): 28.9 % (ref 18–48)
RELATIVE MONOCYTE (OHS): 11.2 % (ref 4–15)
RELATIVE NEUTROPHIL (OHS): 55.9 % (ref 38–73)
SODIUM SERPL-SCNC: 136 MMOL/L (ref 136–145)
WBC # BLD AUTO: 8.57 K/UL (ref 3.9–12.7)

## 2025-04-14 PROCEDURE — 25000003 PHARM REV CODE 250: Performed by: NURSE ANESTHETIST, CERTIFIED REGISTERED

## 2025-04-14 PROCEDURE — 93653 COMPRE EP EVAL TX SVT: CPT | Performed by: INTERNAL MEDICINE

## 2025-04-14 PROCEDURE — 63600175 PHARM REV CODE 636 W HCPCS: Performed by: INTERNAL MEDICINE

## 2025-04-14 PROCEDURE — 93653 COMPRE EP EVAL TX SVT: CPT | Mod: GC,,, | Performed by: INTERNAL MEDICINE

## 2025-04-14 PROCEDURE — 82962 GLUCOSE BLOOD TEST: CPT | Performed by: INTERNAL MEDICINE

## 2025-04-14 PROCEDURE — 83735 ASSAY OF MAGNESIUM: CPT | Performed by: STUDENT IN AN ORGANIZED HEALTH CARE EDUCATION/TRAINING PROGRAM

## 2025-04-14 PROCEDURE — 36415 COLL VENOUS BLD VENIPUNCTURE: CPT | Performed by: STUDENT IN AN ORGANIZED HEALTH CARE EDUCATION/TRAINING PROGRAM

## 2025-04-14 PROCEDURE — 02K83ZZ MAP CONDUCTION MECHANISM, PERCUTANEOUS APPROACH: ICD-10-PCS | Performed by: INTERNAL MEDICINE

## 2025-04-14 PROCEDURE — 80053 COMPREHEN METABOLIC PANEL: CPT | Performed by: STUDENT IN AN ORGANIZED HEALTH CARE EDUCATION/TRAINING PROGRAM

## 2025-04-14 PROCEDURE — 02583ZZ DESTRUCTION OF CONDUCTION MECHANISM, PERCUTANEOUS APPROACH: ICD-10-PCS | Performed by: INTERNAL MEDICINE

## 2025-04-14 PROCEDURE — 93005 ELECTROCARDIOGRAM TRACING: CPT

## 2025-04-14 PROCEDURE — 25000003 PHARM REV CODE 250: Performed by: STUDENT IN AN ORGANIZED HEALTH CARE EDUCATION/TRAINING PROGRAM

## 2025-04-14 PROCEDURE — 93010 ELECTROCARDIOGRAM REPORT: CPT | Mod: ,,, | Performed by: INTERNAL MEDICINE

## 2025-04-14 PROCEDURE — 27201037 HC PRESSURE MONITORING SET UP

## 2025-04-14 PROCEDURE — 94760 N-INVAS EAR/PLS OXIMETRY 1: CPT

## 2025-04-14 PROCEDURE — 21400001 HC TELEMETRY ROOM

## 2025-04-14 PROCEDURE — 85025 COMPLETE CBC W/AUTO DIFF WBC: CPT | Performed by: STUDENT IN AN ORGANIZED HEALTH CARE EDUCATION/TRAINING PROGRAM

## 2025-04-14 PROCEDURE — 37000009 HC ANESTHESIA EA ADD 15 MINS: Performed by: INTERNAL MEDICINE

## 2025-04-14 PROCEDURE — 25000003 PHARM REV CODE 250: Performed by: HOSPITALIST

## 2025-04-14 PROCEDURE — 84100 ASSAY OF PHOSPHORUS: CPT | Performed by: STUDENT IN AN ORGANIZED HEALTH CARE EDUCATION/TRAINING PROGRAM

## 2025-04-14 PROCEDURE — 94761 N-INVAS EAR/PLS OXIMETRY MLT: CPT

## 2025-04-14 PROCEDURE — C1733 CATH, EP, OTHR THAN COOL-TIP: HCPCS | Performed by: INTERNAL MEDICINE

## 2025-04-14 PROCEDURE — 4A0234Z MEASUREMENT OF CARDIAC ELECTRICAL ACTIVITY, PERCUTANEOUS APPROACH: ICD-10-PCS | Performed by: INTERNAL MEDICINE

## 2025-04-14 PROCEDURE — C1730 CATH, EP, 19 OR FEW ELECT: HCPCS | Performed by: INTERNAL MEDICINE

## 2025-04-14 PROCEDURE — 63600175 PHARM REV CODE 636 W HCPCS: Performed by: NURSE ANESTHETIST, CERTIFIED REGISTERED

## 2025-04-14 PROCEDURE — 25000003 PHARM REV CODE 250

## 2025-04-14 PROCEDURE — C1894 INTRO/SHEATH, NON-LASER: HCPCS | Performed by: INTERNAL MEDICINE

## 2025-04-14 PROCEDURE — 4A023FZ MEASUREMENT OF CARDIAC RHYTHM, PERCUTANEOUS APPROACH: ICD-10-PCS | Performed by: INTERNAL MEDICINE

## 2025-04-14 PROCEDURE — 37000008 HC ANESTHESIA 1ST 15 MINUTES: Performed by: INTERNAL MEDICINE

## 2025-04-14 PROCEDURE — 27201423 OPTIME MED/SURG SUP & DEVICES STERILE SUPPLY: Performed by: INTERNAL MEDICINE

## 2025-04-14 RX ORDER — LIDOCAINE HYDROCHLORIDE 20 MG/ML
INJECTION, SOLUTION INFILTRATION; PERINEURAL
Status: DISCONTINUED | OUTPATIENT
Start: 2025-04-14 | End: 2025-04-14 | Stop reason: HOSPADM

## 2025-04-14 RX ORDER — HEPARIN SOD,PORCINE/0.9 % NACL 1000/500ML
INTRAVENOUS SOLUTION INTRAVENOUS
Status: DISCONTINUED | OUTPATIENT
Start: 2025-04-14 | End: 2025-04-14 | Stop reason: HOSPADM

## 2025-04-14 RX ORDER — PROPOFOL 10 MG/ML
VIAL (ML) INTRAVENOUS CONTINUOUS PRN
Status: DISCONTINUED | OUTPATIENT
Start: 2025-04-14 | End: 2025-04-14

## 2025-04-14 RX ORDER — FENTANYL CITRATE 50 UG/ML
INJECTION, SOLUTION INTRAMUSCULAR; INTRAVENOUS
Status: DISCONTINUED | OUTPATIENT
Start: 2025-04-14 | End: 2025-04-14

## 2025-04-14 RX ORDER — OXYCODONE HYDROCHLORIDE 5 MG/1
5 TABLET ORAL ONCE AS NEEDED
Refills: 0 | Status: COMPLETED | OUTPATIENT
Start: 2025-04-14 | End: 2025-04-14

## 2025-04-14 RX ADMIN — METOPROLOL TARTRATE 50 MG: 50 TABLET, FILM COATED ORAL at 12:04

## 2025-04-14 RX ADMIN — ACYCLOVIR 800 MG: 800 TABLET ORAL at 10:04

## 2025-04-14 RX ADMIN — FENTANYL CITRATE 100 MCG: 50 INJECTION, SOLUTION INTRAMUSCULAR; INTRAVENOUS at 04:04

## 2025-04-14 RX ADMIN — METOPROLOL TARTRATE 50 MG: 50 TABLET, FILM COATED ORAL at 11:04

## 2025-04-14 RX ADMIN — APIXABAN 5 MG: 5 TABLET, FILM COATED ORAL at 10:04

## 2025-04-14 RX ADMIN — VANCOMYCIN HYDROCHLORIDE 1000 MG: 1 INJECTION, POWDER, LYOPHILIZED, FOR SOLUTION INTRAVENOUS at 03:04

## 2025-04-14 RX ADMIN — CETIRIZINE HYDROCHLORIDE 10 MG: 10 TABLET, FILM COATED ORAL at 09:04

## 2025-04-14 RX ADMIN — ACYCLOVIR 800 MG: 800 TABLET ORAL at 06:04

## 2025-04-14 RX ADMIN — ACYCLOVIR 800 MG: 800 TABLET ORAL at 12:04

## 2025-04-14 RX ADMIN — ALPRAZOLAM 0.25 MG: 0.25 TABLET ORAL at 09:04

## 2025-04-14 RX ADMIN — ACYCLOVIR 800 MG: 800 TABLET ORAL at 09:04

## 2025-04-14 RX ADMIN — ACETAMINOPHEN 650 MG: 325 TABLET ORAL at 06:04

## 2025-04-14 RX ADMIN — FENTANYL CITRATE 50 MCG: 50 INJECTION, SOLUTION INTRAMUSCULAR; INTRAVENOUS at 05:04

## 2025-04-14 RX ADMIN — OXYCODONE 5 MG: 5 TABLET ORAL at 09:04

## 2025-04-14 RX ADMIN — PROMETHAZINE HYDROCHLORIDE AND CODEINE PHOSPHATE 5 ML: 6.25; 1 SOLUTION ORAL at 08:04

## 2025-04-14 RX ADMIN — LEVOTHYROXINE SODIUM 150 MCG: 150 TABLET ORAL at 06:04

## 2025-04-14 RX ADMIN — ACYCLOVIR 800 MG: 800 TABLET ORAL at 07:04

## 2025-04-14 RX ADMIN — PROPOFOL 125 MCG/KG/MIN: 10 INJECTION, EMULSION INTRAVENOUS at 03:04

## 2025-04-14 RX ADMIN — METOPROLOL TARTRATE 50 MG: 50 TABLET, FILM COATED ORAL at 06:04

## 2025-04-14 RX ADMIN — PROMETHAZINE HYDROCHLORIDE AND CODEINE PHOSPHATE 5 ML: 6.25; 1 SOLUTION ORAL at 06:04

## 2025-04-14 RX ADMIN — SODIUM CHLORIDE: 0.9 INJECTION, SOLUTION INTRAVENOUS at 02:04

## 2025-04-14 NOTE — SUBJECTIVE & OBJECTIVE
Interval History: Pending CTI ablation today. NAEON. No acute complaints.     Review of Systems   Constitutional: Negative for diaphoresis and fever.   Cardiovascular:  Positive for dyspnea on exertion. Negative for chest pain, leg swelling, near-syncope, orthopnea, palpitations, paroxysmal nocturnal dyspnea and syncope.   Respiratory:  Negative for cough and shortness of breath.    Gastrointestinal:  Negative for abdominal pain, diarrhea, nausea and vomiting.   Neurological:  Negative for light-headedness.   Psychiatric/Behavioral:  Negative for altered mental status and substance abuse.      Objective:     Vital Signs (Most Recent):  Temp: 98 °F (36.7 °C) (04/14/25 1133)  Pulse: (!) 130 (04/14/25 1228)  Resp: 20 (04/14/25 1133)  BP: (!) 89/62 (04/14/25 1228)  SpO2: 96 % (04/14/25 1133) Vital Signs (24h Range):  Temp:  [97.6 °F (36.4 °C)-98.6 °F (37 °C)] 98 °F (36.7 °C)  Pulse:  [126-136] 130  Resp:  [18-20] 20  SpO2:  [94 %-98 %] 96 %  BP: ()/(50-68) 89/62     Weight: 90.3 kg (199 lb)  Body mass index is 31.17 kg/m².     SpO2: 96 %        Physical Exam  Constitutional:       Appearance: Normal appearance.   Cardiovascular:      Rate and Rhythm: Regular rhythm. Tachycardia present.      Pulses: Normal pulses.      Heart sounds: Normal heart sounds.   Pulmonary:      Effort: Pulmonary effort is normal.      Breath sounds: Normal breath sounds. No wheezing or rales.   Abdominal:      General: Abdomen is flat. There is no distension.      Palpations: Abdomen is soft.      Tenderness: There is no abdominal tenderness.   Musculoskeletal:      Right lower leg: No edema.      Left lower leg: No edema.   Skin:     General: Skin is warm and dry.   Neurological:      Mental Status: She is alert and oriented to person, place, and time. Mental status is at baseline.   Psychiatric:         Mood and Affect: Mood normal.         Behavior: Behavior normal.            Significant Labs: All pertinent lab results from the last  24 hours have been reviewed.    Significant Imaging:  Reviewed

## 2025-04-14 NOTE — ASSESSMENT & PLAN NOTE
Patient presenting from clinic with tachycardia.  Smart watch reported episodes of notable tachycardia in January in February, but sustained episodes of tachycardia since March.    - EKG in the ED with atrial flutter 2:1 in the 120s  - Not responsive to IV metoprolol or diltiazem  - TSH normal   - , troponin negative  - Cardiology consulted   -- Continue PO metoprolol, titrate to heart rate goal <110  -- Apixaban for anticoagulation  -- TTE completed  - EP consulted  -- Ablation 4/14  - Telemetry

## 2025-04-14 NOTE — PROGRESS NOTES
[facility] Surg  Cardiac Electrophysiology  Progress Note    Admission Date: 4/11/2025  Code Status: Full Code   Attending Physician: Arnulfo Sears MD   Expected Discharge Date: 4/15/2025  Principal Problem:Typical atrial flutter    Subjective:     Interval History: Pending CTI ablation today. NAEON. No acute complaints.     Review of Systems   Constitutional: Negative for diaphoresis and fever.   Cardiovascular:  Positive for dyspnea on exertion. Negative for chest pain, leg swelling, near-syncope, orthopnea, palpitations, paroxysmal nocturnal dyspnea and syncope.   Respiratory:  Negative for cough and shortness of breath.    Gastrointestinal:  Negative for abdominal pain, diarrhea, nausea and vomiting.   Neurological:  Negative for light-headedness.   Psychiatric/Behavioral:  Negative for altered mental status and substance abuse.      Objective:     Vital Signs (Most Recent):  Temp: 98 °F (36.7 °C) (04/14/25 1133)  Pulse: (!) 130 (04/14/25 1228)  Resp: 20 (04/14/25 1133)  BP: (!) 89/62 (04/14/25 1228)  SpO2: 96 % (04/14/25 1133) Vital Signs (24h Range):  Temp:  [97.6 °F (36.4 °C)-98.6 °F (37 °C)] 98 °F (36.7 °C)  Pulse:  [126-136] 130  Resp:  [18-20] 20  SpO2:  [94 %-98 %] 96 %  BP: ()/(50-68) 89/62     Weight: 90.3 kg (199 lb)  Body mass index is 31.17 kg/m².     SpO2: 96 %        Physical Exam  Constitutional:       Appearance: Normal appearance.   Cardiovascular:      Rate and Rhythm: Regular rhythm. Tachycardia present.      Pulses: Normal pulses.      Heart sounds: Normal heart sounds.   Pulmonary:      Effort: Pulmonary effort is normal.      Breath sounds: Normal breath sounds. No wheezing or rales.   Abdominal:      General: Abdomen is flat. There is no distension.      Palpations: Abdomen is soft.      Tenderness: There is no abdominal tenderness.   Musculoskeletal:      Right lower leg: No edema.      Left lower leg: No edema.   Skin:     General: Skin is warm and dry.   Neurological:       Mental Status: She is alert and oriented to person, place, and time. Mental status is at baseline.   Psychiatric:         Mood and Affect: Mood normal.         Behavior: Behavior normal.            Significant Labs: All pertinent lab results from the last 24 hours have been reviewed.    Significant Imaging:  Reviewed  Assessment and Plan:     * Typical atrial flutter  75yoF with a longstanding hx of mitral valve prolapse (sees cardiology at [facility]), Br CA (low risk, on RT and hormone therapy), hypothyroidism, degenerative joint disease s/p multiple joint replacements who is here in new typical atrial flutter. EP is consulted for AFL.    This flutter has likely been going on for 2-3 weeks. She is having dyspnea on exertion with mildly elevated BNP and likely has mild volume overload in the setting of poorly rate controlled AFL.     Recommendations:  - NPO  - Continue Eliquis  - Continue Lopressor  - Pending CTI ablation today.         Shahid Rodrigues MD  Cardiac Electrophysiology  [facility] - Med Surg

## 2025-04-14 NOTE — ASSESSMENT & PLAN NOTE
75yoF with a longstanding hx of mitral valve prolapse (sees cardiology at [facility]), Br CA (low risk, on RT and hormone therapy), hypothyroidism, degenerative joint disease s/p multiple joint replacements who is here in new typical atrial flutter. EP is consulted for AFL.    This flutter has likely been going on for 2-3 weeks. She is having dyspnea on exertion with mildly elevated BNP and likely has mild volume overload in the setting of poorly rate controlled AFL.     Recommendations:  - NPO  - Continue Eliquis  - Continue Lopressor  - Pending CTI ablation today.

## 2025-04-14 NOTE — TRANSFER OF CARE
"Anesthesia Transfer of Care Note    Patient: Beth Cantu    Procedure(s) Performed: Procedure(s) (LRB):  Ablation, Atrial Flutter, Typical (N/A)  Transesophageal echo (SIMI) intra-procedure log documentation (N/A)    Patient location: PACU    Anesthesia Type: general    Transport from OR: Transported from OR on 2-3 L/min O2 by NC with adequate spontaneous ventilation    Post pain: adequate analgesia    Post assessment: no apparent anesthetic complications    Post vital signs: stable    Level of consciousness: awake    Nausea/Vomiting: no nausea/vomiting    Complications: none    Transfer of care protocol was followed      Last vitals: Visit Vitals  /69   Pulse 83   Temp 36.1 °C (97 °F) (Temporal)   Resp 17   Ht 5' 7" (1.702 m)   Wt 90.3 kg (199 lb)   LMP 02/17/1986 (Approximate)   SpO2 96%   Breastfeeding No   BMI 31.17 kg/m²     "

## 2025-04-14 NOTE — PROGRESS NOTES
[facility] - Cardiology  [facility] Medicine  Progress Note    Patient Name: Beth Cantu  MRN: [id number]  Patient Class: IP- Inpatient   Admission Date: 4/11/2025  Length of Stay: 3 days  Attending Physician: Arnulfo Sears MD  Primary Care Provider: Bruce Artis MD        Subjective     Principal Problem:Typical atrial flutter        HPI:  Mrs. Cantu is a 75-year-old female with a past medical history of breast cancer, hypothyroidism, hip replacement, mitral valve prolapse who presents from clinic with tachycardia.  Patient reports her smart watch has been showing heart rates elevated periodically in January and February of this year, but has been consistently show heart rates in the mid 100s since March.  Patient was recently treated for upper respiratory infection where she had brief episode of dyspnea.  But denies dyspnea, chest pain, lightheadedness, medication changes, dizziness, palpitations, nausea, presyncope, bilateral lower extremity swelling.  Patient was noted to have heart rate in the 160s in clinic and sent to the ED for further evaluation.  Denies previous episodes of abnormal heart rhythms.    In the ED, , /69, afebrile, and on room air.  Labs notable for TSH 3.4, troponin normal, unremarkable BNP/CBC, and unremarkable chest x-ray.  Patient given 5 mg IV metoprolol and 10 mg IV diltiazem without improvement of heart rate.    Overview/Hospital Course:  No notes on file    Interval History:   No events overnight.  Patient with no complaints.  Cardiac ablation today with EP.      Review of Systems   All other systems reviewed and are negative.    Objective:     Vital Signs (Most Recent):  Temp: 98 °F (36.7 °C) (04/14/25 1133)  Pulse: (!) 129 (04/14/25 1436)  Resp: 20 (04/14/25 1133)  BP: (!) 89/62 (04/14/25 1400)  SpO2: 96 % (04/14/25 1133) Vital Signs (24h Range):  Temp:  [97.6 °F (36.4 °C)-98.6 °F (37 °C)] 98 °F (36.7 °C)  Pulse:  [126-136] 129  Resp:  [18-20] 20  SpO2:   [94 %-98 %] 96 %  BP: ()/(50-68) 89/62     Weight: 90.3 kg (199 lb)  Body mass index is 31.17 kg/m².    Intake/Output Summary (Last 24 hours) at 4/14/2025 1451  Last data filed at 4/13/2025 2015  Gross per 24 hour   Intake 120 ml   Output --   Net 120 ml         Physical Exam  Constitutional:       General: She is not in acute distress.     Appearance: Normal appearance.   HENT:      Head: Normocephalic and atraumatic.      Mouth/Throat:      Mouth: Mucous membranes are moist.   Eyes:      Extraocular Movements: Extraocular movements intact.      Conjunctiva/sclera: Conjunctivae normal.   Cardiovascular:      Rate and Rhythm: Regular rhythm. Tachycardia present.   Pulmonary:      Effort: Pulmonary effort is normal. No respiratory distress.      Breath sounds: Normal breath sounds. No wheezing or rales.   Abdominal:      General: Abdomen is flat. Bowel sounds are normal.      Palpations: Abdomen is soft.      Tenderness: There is no abdominal tenderness. There is no guarding.   Musculoskeletal:         General: No swelling or tenderness.      Right lower leg: No edema.      Left lower leg: No edema.   Skin:     Findings: Rash (medial left buttock clustered clear fluid filled blisters (see media)) present.   Neurological:      General: No focal deficit present.      Mental Status: She is alert and oriented to person, place, and time. Mental status is at baseline.   Psychiatric:         Mood and Affect: Mood normal.         Behavior: Behavior normal.               Significant Labs: All pertinent labs within the past 24 hours have been reviewed.  CBC:   Recent Labs   Lab 04/13/25  0325 04/14/25  0522   WBC 10.53 8.57   HGB 13.0 13.0   HCT 40.5 40.5    180     CMP:   Recent Labs   Lab 04/13/25  0325 04/14/25  0522    136   K 4.5 4.3    105   CO2 18* 20*   BUN 15 13   CREATININE 0.7 0.7   CALCIUM 8.2* 8.2*   ALBUMIN 3.2* 3.1*   BILITOT 0.6 0.5   ALKPHOS 82 79   AST 21 15   ALT 21 19   ANIONGAP 10  11       Significant Imaging: I have reviewed all pertinent imaging results/findings within the past 24 hours.      Assessment & Plan  Typical atrial flutter  Patient presenting from clinic with tachycardia.  Smart watch reported episodes of notable tachycardia in January in February, but sustained episodes of tachycardia since March.    - EKG in the ED with atrial flutter 2:1 in the 120s  - Not responsive to IV metoprolol or diltiazem  - TSH normal   - , troponin negative  - Cardiology consulted   -- Continue PO metoprolol, titrate to heart rate goal <110  -- Apixaban for anticoagulation  -- TTE completed  - EP consulted  -- Ablation 4/14  - Telemetry    Hypothyroidism associated with surgical procedure  - Home levothyroxine    Invasive lobular carcinoma of breast in female  - Follows with Oncology outpatient    Anxiety  - Home Xanax PRN    Herpes simplex virus (HSV) infection  - h/o dating back to 2016 on chart review  - rash patient noted starting just prior to presentation consistent HSV outbreak  - Acyclovir x5 daily x7 days, sot 4/13        VTE Risk Mitigation (From admission, onward)           Ordered     apixaban tablet 5 mg  2 times daily         04/13/25 1459     IP VTE HIGH RISK PATIENT  Once         04/11/25 1548     Place sequential compression device  Until discontinued         04/11/25 1548                    Discharge Planning   STEFANIE: 4/15/2025     Code Status: Full Code   Medical Readiness for Discharge Date:   Discharge Plan A: Home                        Arnulfo Sears MD  Department of Hospital Medicine   [facility] - Cardiology

## 2025-04-14 NOTE — SUBJECTIVE & OBJECTIVE
Interval History:   No events overnight.  Patient with no complaints.  Cardiac ablation today with EP.      Review of Systems   All other systems reviewed and are negative.    Objective:     Vital Signs (Most Recent):  Temp: 98 °F (36.7 °C) (04/14/25 1133)  Pulse: (!) 129 (04/14/25 1436)  Resp: 20 (04/14/25 1133)  BP: (!) 89/62 (04/14/25 1400)  SpO2: 96 % (04/14/25 1133) Vital Signs (24h Range):  Temp:  [97.6 °F (36.4 °C)-98.6 °F (37 °C)] 98 °F (36.7 °C)  Pulse:  [126-136] 129  Resp:  [18-20] 20  SpO2:  [94 %-98 %] 96 %  BP: ()/(50-68) 89/62     Weight: 90.3 kg (199 lb)  Body mass index is 31.17 kg/m².    Intake/Output Summary (Last 24 hours) at 4/14/2025 1451  Last data filed at 4/13/2025 2015  Gross per 24 hour   Intake 120 ml   Output --   Net 120 ml         Physical Exam  Constitutional:       General: She is not in acute distress.     Appearance: Normal appearance.   HENT:      Head: Normocephalic and atraumatic.      Mouth/Throat:      Mouth: Mucous membranes are moist.   Eyes:      Extraocular Movements: Extraocular movements intact.      Conjunctiva/sclera: Conjunctivae normal.   Cardiovascular:      Rate and Rhythm: Regular rhythm. Tachycardia present.   Pulmonary:      Effort: Pulmonary effort is normal. No respiratory distress.      Breath sounds: Normal breath sounds. No wheezing or rales.   Abdominal:      General: Abdomen is flat. Bowel sounds are normal.      Palpations: Abdomen is soft.      Tenderness: There is no abdominal tenderness. There is no guarding.   Musculoskeletal:         General: No swelling or tenderness.      Right lower leg: No edema.      Left lower leg: No edema.   Skin:     Findings: Rash (medial left buttock clustered clear fluid filled blisters (see media)) present.   Neurological:      General: No focal deficit present.      Mental Status: She is alert and oriented to person, place, and time. Mental status is at baseline.   Psychiatric:         Mood and Affect: Mood  normal.         Behavior: Behavior normal.               Significant Labs: All pertinent labs within the past 24 hours have been reviewed.  CBC:   Recent Labs   Lab 04/13/25  0325 04/14/25  0522   WBC 10.53 8.57   HGB 13.0 13.0   HCT 40.5 40.5    180     CMP:   Recent Labs   Lab 04/13/25 0325 04/14/25  0522    136   K 4.5 4.3    105   CO2 18* 20*   BUN 15 13   CREATININE 0.7 0.7   CALCIUM 8.2* 8.2*   ALBUMIN 3.2* 3.1*   BILITOT 0.6 0.5   ALKPHOS 82 79   AST 21 15   ALT 21 19   ANIONGAP 10 11       Significant Imaging: I have reviewed all pertinent imaging results/findings within the past 24 hours.

## 2025-04-14 NOTE — CONSULTS
[facility]  SIMI Consult      Beth Cantu  YOB: 1950  Medical Record Number:  [id number]  Attending Physician:  Arnulfo Sears MD   Date of Admission: 2025       Hospital Day:  3  Current Principal Problem:  Typical atrial flutter    Patient information was obtained from patient and past medical records.  History     Cc: Atrial Flutter     HPI  Beth Cantu is a 75-year-old female with hypothyroidism, mitral valve prolapse, breast cancer s/p partial mastectomy and radiation who presents with tachycardia. She was at her oncology follow up appointment today and she was found to be tachycardic to the 160s and was sent here for further evaluation. She reports that her Apple watch has been telling her that she has had an elevated heart rate for several months, more persistent over the past 2 weeks, but she thought it was an issue with the watch because she has not had any symptoms. She has not felt any palpitations, chest pain, SOB, lightheadedness/dizziness. She does report possible bilateral ankle swelling which is new. No history of Afib or abnormal heart function besides MVP. She sees cardiologist Dr. Benitez with [facility].    EKG with atrial flutter with 2:1 block rate 127. BP stable. TSH WNL.  trop <3. CXR with mildly increased basilar interstitial markings. In the ED, she received IV metoprolol and IV diltiazem with no change in HR. Bedside echo with EF 40-45% and evidence of MVP.      Patient admitted to hospital medicine. Remains asymptomatic but with heart rate consistently ~124 in atrial flutter.         Today, she presents for ablation procedure with Dr. Peterson. Patient in good spirits, she denies any active cardiac complaints.  at bedside.     Anticoagulant/antiplatelets: Eliquis 5 mg BID/ASA 81 mg qd   EC:1 Atrial flutter at 127 bpm (25)  Platelet count: in process   INR: None     History of stroke:  no  Dysphagia or  odynophagia:  no  Liver Disease, esophageal disease, or known varices:  no  Upper GI Bleeding:  no  Snoring:  no   Sleep Apnea:  no  Prior neck surgery or radiation:  5 sessions of Radiation therapy completed of both breasts completed December 2024   History of anesthetic difficulties:  no  Family history of anesthetic difficulties:  no  Last oral intake: last pm   Able to move neck in all directions:  yes  Use of GLP-1:  no      Medications - Outpatient  Prior to Admission medications    Medication Sig Start Date End Date Taking? Authorizing Provider   ALPRAZolam (XANAX) 0.25 MG tablet Take 1 tablet (0.25 mg total) by mouth nightly as needed for Insomnia. 4/10/25   Bruce Artis MD   anastrozole (ARIMIDEX) 1 mg Tab Take 1 tablet (1 mg total) by mouth once daily. 10/31/24 10/31/25  Sarah Eduardo MD   aspirin (ECOTRIN) 81 MG EC tablet Take 1 tablet (81 mg total) by mouth 2 (two) times daily. 2/10/25   Dionicio Fonseca MD   azelastine (ASTELIN) 137 mcg (0.1 %) nasal spray 2 sprays 2 (two) times daily. 12/30/24   Provider, Historical   b complex vitamins tablet Take 1 tablet by mouth once daily.    Provider, Historical   inositol/choline/biofla/vitB,C (LIPO-FLAVONOID ORAL) Take by mouth.    Provider, Historical   levothyroxine (SYNTHROID) 150 MCG tablet Take 150 mcg by mouth once daily.    Provider, Historical   loratadine (CLARITIN) 10 mg tablet Take 10 mg by mouth once daily.    Provider, Historical   multivitamin capsule Take 1 capsule by mouth once daily.    Provider, Historical   ondansetron (ZOFRAN-ODT) 8 MG TbDL dissolve 1 tablet (8 mg total) by mouth every 8 (eight) hours as needed (Nausea). 2/10/25   Dionicio Fonseca MD   oxyCODONE-acetaminophen (PERCOCET) 5-325 mg per tablet take 1 tablet every 4 hours as needed  or 2 tablets every 6 hours as needed 2/10/25   Dionicio Fonseca MD   propranoloL (INDERAL LA) 120 MG 24 hr capsule once daily.    Provider, Historical   sumatriptan (IMITREX) 25 MG Tab   11/29/16   Provider, Historical   vitamin D (VITAMIN D3) 1000 units Tab Take 1,000 Units by mouth once daily.    Provider, Historical       Medications - Current  Scheduled Meds:   acyclovir  800 mg Oral 5x Daily    apixaban  5 mg Oral BID    cetirizine  10 mg Oral Daily    levothyroxine  150 mcg Oral Before breakfast    metoprolol tartrate  50 mg Oral Q6H     Continuous Infusions:  PRN Meds:.  Current Facility-Administered Medications:     acetaminophen, 650 mg, Oral, Q8H PRN    ALPRAZolam, 0.25 mg, Oral, Nightly PRN    bisacodyL, 10 mg, Rectal, Daily PRN    melatonin, 6 mg, Oral, Nightly PRN    naloxone, 0.02 mg, Intravenous, PRN    ondansetron, 8 mg, Oral, Q8H PRN    polyethylene glycol, 17 g, Oral, Daily PRN    prochlorperazine, 5 mg, Intravenous, Q6H PRN    promethazine-codeine 6.25-10 mg/5 ml, 5 mL, Oral, Q4H PRN    sodium chloride 0.9%, 10 mL, Intravenous, Q12H PRN      Allergies  Review of patient's allergies indicates:   Allergen Reactions    Penicillin Hives and Rash     Other reaction(s): in high school, Skin Rashes/Hives       Past Medical History  Past Medical History:   Diagnosis Date    Absence of both cervix and uterus, acquired     TVH    Basal cell carcinoma (BCC)     1990's    Breast cancer     Lumpectomy 9/4/2024 right breast [facility] Lumpectomy left breast 10/3/2024 [facility]    Breast cyst     Cancer     right breast    H/O bone density study 07/21/2014    Normal    H/O colonoscopy 2005    Cologard done 10/2016, Negative    H/O mammogram 2015    Normal      Herpes simplex virus (HSV) infection     Hypothyroidism associated with surgical procedure     Migraines     MVP (mitral valve prolapse)     Osteoarthritis     Overweight     Thyroid disease     Vaginal Pap smear 2006    Normal       Past Surgical History  Past Surgical History:   Procedure Laterality Date    ARTHROPLASTY OF HIP BY POSTERIOR APPROACH Left 2/10/2025    Procedure: ARTHROPLASTY, HIP, TOTAL, POSTERIOR APPROACH;  Surgeon: Dionicio Fonseca,  MD;  [facility];  Service: Orthopedics;  Laterality: Left;  OFIRMEV    ARTHROPLASTY, KNEE, TOTAL, SIGHT ASSISTED Right 06/28/2021    Procedure: ARTHROPLASTY, KNEE, TOTAL, SIGHT ASSISTED;  Surgeon: Dionicio Fonseca MD;  [facility];  Service: Orthopedics;  Laterality: Right;    BREAST CYST ASPIRATION      CHOLECYSTECTOMY  2003    COLONOSCOPY      DILATION AND CURETTAGE OF UTERUS  1979    Missed Ab    HYSTERECTOMY  1986    TVH - menorrhagia    INJECTION FOR SENTINEL NODE IDENTIFICATION Bilateral 10/9/2024    Procedure: INJECTION, FOR SENTINEL NODE IDENTIFICATION;  Surgeon: Sarah Mims MD;  [facility];  Service: General;  Laterality: Bilateral;    JOINT REPLACEMENT      left knee    KNEE SURGERY Left 2013    Replacement - In [place]    MASTECTOMY, PARTIAL Bilateral 10/9/2024    Procedure: MASTECTOMY, PARTIAL BILATERAL;  Surgeon: Sarah Mims MD;  [facility];  Service: General;  Laterality: Bilateral;    SENTINEL LYMPH NODE BIOPSY Bilateral 10/9/2024    Procedure: BIOPSY, LYMPH NODE, SENTINEL;  Surgeon: Sarah Mims MD;  [facility];  Service: General;  Laterality: Bilateral;    THYROIDECTOMY  2013    Partial 1973    TONSILLECTOMY      TYMPANOSTOMY TUBE PLACEMENT Left        Social History  Social History     Socioeconomic History    Marital status:      Spouse name: Klaus    Number of children: 3   Tobacco Use    Smoking status: Never    Smokeless tobacco: Never   Substance and Sexual Activity    Alcohol use: Yes     Alcohol/week: 7.0 standard drinks of alcohol     Types: 7 Glasses of wine per week     Comment: Socially - Wine    Drug use: No    Sexual activity: Yes     Partners: Male     Birth control/protection: Surgical     Comment: :  Klaus   Social History Narrative        Retired    3 adult sons (Luis, Philip, Beny), 6 grandchildren     Social Drivers of Health     Financial Resource Strain: Low Risk  (4/13/2025)    Overall Financial Resource Strain  "(CARDIA)     Difficulty of Paying Living Expenses: Not hard at all   Food Insecurity: No Food Insecurity (4/13/2025)    Hunger Vital Sign     Worried About Running Out of Food in the Last Year: Never true     Ran Out of Food in the Last Year: Never true   Transportation Needs: No Transportation Needs (4/13/2025)    PRAPARE - Transportation     Lack of Transportation (Medical): No     Lack of Transportation (Non-Medical): No   Physical Activity: Insufficiently Active (4/13/2025)    Exercise Vital Sign     Days of Exercise per Week: 4 days     Minutes of Exercise per Session: 30 min   Stress: No Stress Concern Present (4/13/2025)    [employer] - Occupational Stress Questionnaire     Feeling of Stress : Not at all   Housing Stability: Low Risk  (4/13/2025)    Housing Stability Vital Sign     Unable to Pay for Housing in the Last Year: No     Number of Times Moved in the Last Year: 0     Homeless in the Last Year: No       ROS  10 point ROS performed and negative except as stated in HPI     Physical Examination     Vital Signs  24 Hour VS Range    Temp:  [97.6 °F (36.4 °C)-98.6 °F (37 °C)]   Pulse:  [126-136]   Resp:  [18-20]   BP: ()/(50-68)   SpO2:  [94 %-98 %]     Intake/Output Summary (Last 24 hours) at 4/14/2025 0803  Last data filed at 4/13/2025 2015  Gross per 24 hour   Intake 120 ml   Output --   Net 120 ml         Physical Exam:   Constitutional: no acute distress  HEENT: NCAT, EOMI, no scleral icterus  Cardiovascular: Irregularly irregular rate and rhythm   Pulmonary: Normal respiratory effort   Abdomen: nontender, non-distended   Neuro: alert and oriented, no focal deficits  Extremities: warm, no edema   MSK: no deformities  Integument: intact, no rashes     Data       Recent Labs   Lab 04/11/25  1335 04/12/25  0315 04/13/25  0325   WBC 8.84 9.73 10.53   HGB 14.9 14.2 13.0   HCT 45.6 43.7 40.5    203 177        No results for input(s): "PROTIME", "INR" in the last " "168 hours.     Recent Labs   Lab 04/11/25  1335 04/12/25  0427 04/13/25  0325 04/14/25  0522    138 136 136   K 4.8 4.1 4.5 4.3    107 108 105   CO2 25 23 18* 20*   BUN 14 14 15 13   CREATININE 0.8 0.7 0.7 0.7   ANIONGAP 9 8 10 11   CALCIUM 8.9 7.9* 8.2* 8.2*        Recent Labs   Lab 04/11/25  1335 04/12/25  0427 04/13/25  0325 04/14/25  0522   ALBUMIN 4.0 3.3* 3.2* 3.1*   BILITOT 0.7 0.6 0.6 0.5   ALKPHOS 104 84 82 79   AST 18 16 21 15   ALT 27 23 21 19        No results for input(s): "TROPONINI" in the last 168 hours.     BNP (pg/mL)   Date Value   04/11/2025 157 (H)       No results for input(s): "LABBLOO" in the last 168 hours.     Assessment & Plan     #Atrial flutter  -patient presents for ablation procedure with Dr. Peterson   -on Eliquis for CVA prophylaxis, last dose this morning       TTE 04/13/25    Left Ventricle: The left ventricle is normal in size. Ventricular mass is normal. Normal wall thickness. There is concentric remodeling. Mild global hypokinesis present. There is mildly reduced systolic function with a visually estimated ejection fraction of 45 - 50%. There is indeterminate diastolic function. Normal left ventricular filling pressure.    Right Ventricle: The right ventricle is normal in size. Systolic function is mildly reduced.    Left Atrium: Severely dilated    Right Atrium: Right atrium is severely dilated.    Aortic Valve: The aortic valve is a trileaflet valve. There is mild aortic valve sclerosis. There is trace aortic regurgitation.    Mitral Valve: There is mild anterior leaflet sclerosis. There is moderate to severe regurgitation with a centrally directed jet.    Tricuspid Valve: There is moderate regurgitation.    Pulmonary Artery: The estimated pulmonary artery systolic pressure is 25 mmHg.    Pericardium: There is a trivial effusion.    The study was difficult due to patient's heart rhythm. Tachycardia was present during the study HR 120s during study.      -No absolute " contraindications of esophageal stricture, tumor, perforation, laceration,or diverticulum and/or active GI bleed.  -The risks, benefits & alternatives of the procedure were explained to the patient.   -The risks of transesophageal echo include but are not limited to:  Dental trauma, esophageal trauma/perforation, bleeding, laryngospasm/brochospasm, aspiration, sore throat/hoarseness, & dislodgement of the endotracheal tube/nasogastric tube (where applicable).    -The risks of moderate sedation include hypotension, respiratory depression, arrhythmias, bronchospasm, & death.    -Informed consent was obtained. The patient is agreeable to proceed with the procedure and all questions and concerns addressed.    Case was discussed with an attending physician in echocardiography lab prior to procedure.    Nona Castillo PA-C  [facility]

## 2025-04-14 NOTE — CARE UPDATE
"RAPID RESPONSE NURSE CHART REVIEW        Chart Reviewed: 04/14/2025, 7:07 AM    MRN: [id number]  Bed: 641/641 A    Dx: Typical atrial flutter    Beth Cantu has a past medical history of Absence of both cervix and uterus, acquired, Basal cell carcinoma (BCC), Breast cancer, Breast cyst, Cancer, H/O bone density study, H/O colonoscopy, H/O mammogram, Herpes simplex virus (HSV) infection, Hypothyroidism associated with surgical procedure, Migraines, MVP (mitral valve prolapse), Osteoarthritis, Overweight, Thyroid disease, and Vaginal Pap smear.    Last VS: BP (!) 93/57 (BP Location: Right arm, Patient Position: Lying)   Pulse (!) 129   Temp 97.6 °F (36.4 °C) (Oral)   Resp 20   Ht 5' 7" (1.702 m)   Wt 90.3 kg (199 lb)   LMP 02/17/1986 (Approximate) Comment: TVH 1986  SpO2 95%   Breastfeeding No   BMI 31.17 kg/m²     24H Vital Sign Range:  Temp:  [97.6 °F (36.4 °C)-98.6 °F (37 °C)]   Pulse:  [126-136]   Resp:  [18-20]   BP: ()/(50-68)   SpO2:  [94 %-98 %]     Level of Consciousness (AVPU): alert    Recent Labs     04/11/25  1335 04/12/25  0315 04/13/25  0325   WBC 8.84 9.73 10.53   HGB 14.9 14.2 13.0   HCT 45.6 43.7 40.5    203 177       Recent Labs     04/11/25  1335 04/12/25  0427 04/13/25  0325    138 136   K 4.8 4.1 4.5    107 108   CO2 25 23 18*   BUN 14 14 15   CREATININE 0.8 0.7 0.7   PHOS  --   --  4.4   MG 1.9  --  1.7      OXYGEN:      MEWS score: 4    Rounding completed at 09:45 AM.    Rounding completed w/Charge SNOW Alejandra. Pt continues to be in aflutter w/HR in the 120s. BP soft. Morning dose of metoprolol held. Plan for ablation this morning. Continuous telemetry monitoring in place. Labs pending. No additional acute concerns verbalized at this time. Instructed to call [phone] for further concerns or assistance.    Hanna Smith RN       "

## 2025-04-14 NOTE — ANESTHESIA PREPROCEDURE EVALUATION
04/14/2025  Beth Cantu is a 75 y.o., female.    Procedures:      Ablation, Atrial Flutter, Typical - AFL, CTI ablation/SIMI, LINK, Anes, SK, Rm# [id number]      Transesophageal echo (SIMI) intra-procedure log documentation   Anesthesia type: Monitor Anesthesia Care       Pre-operative evaluation for Procedure(s) (LRB):  Ablation, Atrial Flutter, Typical (N/A)  Transesophageal echo (SIMI) intra-procedure log documentation (N/A)      Encounter Diagnoses   Name Primary?    Tachycardia     Typical atrial flutter Yes    Atrial flutter        Review of patient's allergies indicates:   Allergen Reactions    Penicillin Hives and Rash     Other reaction(s): in high school, Skin Rashes/Hives       Prescriptions Prior to Admission[1]         Medications Ordered Prior to Encounter[2]    Past Medical History:  No date: Absence of both cervix and uterus, acquired      Comment:  TVH  No date: Basal cell carcinoma (BCC)      Comment:  1990's  No date: Breast cancer      Comment:  Lumpectomy 9/4/2024 right breast [facility] Lumpectomy left                breast 10/3/2024 [facility]  No date: Breast cyst  No date: Cancer      Comment:  right breast  07/21/2014: H/O bone density study      Comment:  Normal  2005: H/O colonoscopy      Comment:  Cologard done 10/2016, Negative  2015: H/O mammogram      Comment:  Normal    No date: Herpes simplex virus (HSV) infection  No date: Hypothyroidism associated with surgical procedure  No date: Migraines  No date: MVP (mitral valve prolapse)  No date: Osteoarthritis  No date: Overweight  No date: Thyroid disease  2006: Vaginal Pap smear      Comment:  Normal    Past Surgical History:   Procedure Laterality Date    ARTHROPLASTY OF HIP BY POSTERIOR APPROACH Left 2/10/2025    Procedure: ARTHROPLASTY, HIP, TOTAL, POSTERIOR APPROACH;  Surgeon: Dionicio Fonseca MD;  [facility];  Service: Orthopedics;   "Laterality: Left;  OFIRMEV    ARTHROPLASTY, KNEE, TOTAL, SIGHT ASSISTED Right 06/28/2021    Procedure: ARTHROPLASTY, KNEE, TOTAL, SIGHT ASSISTED;  Surgeon: Dionicio Fonseca MD;  Location: [facility];  Service: Orthopedics;  Laterality: Right;    BREAST CYST ASPIRATION      CHOLECYSTECTOMY  2003    COLONOSCOPY      DILATION AND CURETTAGE OF UTERUS  1979    Missed Ab    HYSTERECTOMY  1986    TVH - menorrhagia    INJECTION FOR SENTINEL NODE IDENTIFICATION Bilateral 10/9/2024    Procedure: INJECTION, FOR SENTINEL NODE IDENTIFICATION;  Surgeon: Sarah Mims MD;  Location: [facility];  Service: General;  Laterality: Bilateral;    JOINT REPLACEMENT      left knee    KNEE SURGERY Left 2013    Replacement - In [place]    MASTECTOMY, PARTIAL Bilateral 10/9/2024    Procedure: MASTECTOMY, PARTIAL BILATERAL;  Surgeon: Sarah Mims MD;  Location: [facility];  Service: General;  Laterality: Bilateral;    SENTINEL LYMPH NODE BIOPSY Bilateral 10/9/2024    Procedure: BIOPSY, LYMPH NODE, SENTINEL;  Surgeon: Sarah Mims MD;  Location: [facility];  Service: General;  Laterality: Bilateral;    THYROIDECTOMY  2013    Partial 1973    TONSILLECTOMY      TYMPANOSTOMY TUBE PLACEMENT Left        Tobacco Use History[3]    Social History     Substance and Sexual Activity   Alcohol Use Yes    Alcohol/week: 7.0 standard drinks of alcohol    Types: 7 Glasses of wine per week    Comment: Socially - Wine       Physical Activity: Insufficiently Active (4/13/2025)    Exercise Vital Sign     Days of Exercise per Week: 4 days     Minutes of Exercise per Session: 30 min       No birth history on file.  Recent Labs     04/14/25  0522   HCT 40.5     Recent Labs     04/14/25  0522        Recent Labs     04/14/25  0522   K 4.3     Recent Labs     04/14/25  0522   CREATININE 0.7     No results for input(s): "GLU" in the last 72 hours.  No results for input(s): "PT" in the last 72 hours.  Vitals:    04/14/25 1133 04/14/25 1143 04/14/25 1400 04/14/25 1436 "   BP: (!) 89/62  (!) 89/62    BP Location: Right arm      Patient Position: Sitting      Pulse: (!) 131 (!) 130 (!) 130 (!) 129   Resp: 20      Temp: 36.7 °C (98 °F)      TempSrc: Oral      SpO2: 96%      Weight:       Height:                           Pre-op Assessment          Review of Systems  Anesthesia Hx:  No problems with previous Anesthesia                Hematology/Oncology:  Hematology Normal                       --  Cancer in past history:                     Cardiovascular:     Hypertension, well controlled Valvular problems/Murmurs (TRICUSPID REGURGE), MR  Denies MI.     Dysrhythmias atrial fibrillation  Denies Angina.                                    Pulmonary:  Pulmonary Normal   Denies COPD.  Denies Asthma.   Denies Shortness of breath.                  Renal/:   Denies Chronic Renal Disease.                Hepatic/GI:      Denies Liver Disease.               Neurological:  Denies TIA.  Denies CVA.    Denies Seizures.                                Endocrine:  Denies Diabetes.         Obesity / BMI > 30      Physical Exam  General: Well nourished, Cooperative, Alert and Oriented    Airway:  Mallampati: II   Mouth Opening: Normal  TM Distance: Normal  Tongue: Normal  Neck ROM: Normal ROM        Anesthesia Plan  Type of Anesthesia, risks & benefits discussed:    Anesthesia Type: Gen Natural Airway  Intra-op Monitoring Plan: Standard ASA Monitors  Post Op Pain Control Plan: IV/PO Opioids PRN  Induction:  IV  Informed Consent: Informed consent signed with the Patient and all parties understand the risks and agree with anesthesia plan.  All questions answered.   ASA Score: 2  Day of Surgery Review of History & Physical: H&P Update referred to the surgeon/provider.    Ready For Surgery From Anesthesia Perspective.     .      Intubation     Date/Time: 10/9/2024 12:05 PM     Performed by: Dayo Jewell CRNA  Authorized by: Bruce Busby MD    Intubation:     Induction:  Intravenous     Intubated:  Postinduction    Mask Ventilation:  Easy mask    Attempts:  1    Attempted By:  CRNA    Method of Intubation:  Video laryngoscopy    Blade:  Alonso 3    Laryngeal View Grade: Grade I - full view of cords      Difficult Airway Encountered?: No      Complications:  None    Airway Device:  Oral endotracheal tube    Airway Device Size:  7.0    Style/Cuff Inflation:  Cuffed (inflated to minimal occlusive pressure)    Tube secured:  21    Secured at:  The lips    Placement Verified By:  Capnometry and Colorimetric ETCO2 device    Complicating Factors:  None    Findings Post-Intubation:  BS equal bilateral and atraumatic/condition of teeth unchanged          [1]   Medications Prior to Admission   Medication Sig Dispense Refill Last Dose/Taking    ALPRAZolam (XANAX) 0.25 MG tablet Take 1 tablet (0.25 mg total) by mouth nightly as needed for Insomnia. 30 tablet 4     anastrozole (ARIMIDEX) 1 mg Tab Take 1 tablet (1 mg total) by mouth once daily. 90 tablet 3     aspirin (ECOTRIN) 81 MG EC tablet Take 1 tablet (81 mg total) by mouth 2 (two) times daily. 60 tablet 0     azelastine (ASTELIN) 137 mcg (0.1 %) nasal spray 2 sprays 2 (two) times daily.       b complex vitamins tablet Take 1 tablet by mouth once daily.       inositol/choline/biofla/vitB,C (LIPO-FLAVONOID ORAL) Take by mouth.       levothyroxine (SYNTHROID) 150 MCG tablet Take 150 mcg by mouth once daily.       loratadine (CLARITIN) 10 mg tablet Take 10 mg by mouth once daily.       multivitamin capsule Take 1 capsule by mouth once daily.       ondansetron (ZOFRAN-ODT) 8 MG TbDL dissolve 1 tablet (8 mg total) by mouth every 8 (eight) hours as needed (Nausea). 20 tablet 1     oxyCODONE-acetaminophen (PERCOCET) 5-325 mg per tablet take 1 tablet every 4 hours as needed  or 2 tablets every 6 hours as needed 60 tablet 0     propranoloL (INDERAL LA) 120 MG 24 hr capsule once daily.       sumatriptan (IMITREX) 25 MG Tab        vitamin D (VITAMIN D3) 1000 units  Tab Take 1,000 Units by mouth once daily.      [2]   No current facility-administered medications on file prior to encounter.     Current Outpatient Medications on File Prior to Encounter   Medication Sig Dispense Refill    ALPRAZolam (XANAX) 0.25 MG tablet Take 1 tablet (0.25 mg total) by mouth nightly as needed for Insomnia. 30 tablet 4    anastrozole (ARIMIDEX) 1 mg Tab Take 1 tablet (1 mg total) by mouth once daily. 90 tablet 3    aspirin (ECOTRIN) 81 MG EC tablet Take 1 tablet (81 mg total) by mouth 2 (two) times daily. 60 tablet 0    azelastine (ASTELIN) 137 mcg (0.1 %) nasal spray 2 sprays 2 (two) times daily.      b complex vitamins tablet Take 1 tablet by mouth once daily.      inositol/choline/biofla/vitB,C (LIPO-FLAVONOID ORAL) Take by mouth.      levothyroxine (SYNTHROID) 150 MCG tablet Take 150 mcg by mouth once daily.      loratadine (CLARITIN) 10 mg tablet Take 10 mg by mouth once daily.      multivitamin capsule Take 1 capsule by mouth once daily.      ondansetron (ZOFRAN-ODT) 8 MG TbDL dissolve 1 tablet (8 mg total) by mouth every 8 (eight) hours as needed (Nausea). 20 tablet 1    oxyCODONE-acetaminophen (PERCOCET) 5-325 mg per tablet take 1 tablet every 4 hours as needed  or 2 tablets every 6 hours as needed 60 tablet 0    propranoloL (INDERAL LA) 120 MG 24 hr capsule once daily.      sumatriptan (IMITREX) 25 MG Tab       vitamin D (VITAMIN D3) 1000 units Tab Take 1,000 Units by mouth once daily.     [3]   Social History  Tobacco Use   Smoking Status Never   Smokeless Tobacco Never

## 2025-04-15 ENCOUNTER — PATIENT MESSAGE (OUTPATIENT)
Dept: ELECTROPHYSIOLOGY | Facility: CLINIC | Age: 75
End: 2025-04-15
Payer: MEDICARE

## 2025-04-15 VITALS
HEIGHT: 67 IN | RESPIRATION RATE: 20 BRPM | OXYGEN SATURATION: 95 % | DIASTOLIC BLOOD PRESSURE: 62 MMHG | HEART RATE: 79 BPM | BODY MASS INDEX: 31.23 KG/M2 | TEMPERATURE: 99 F | SYSTOLIC BLOOD PRESSURE: 113 MMHG | WEIGHT: 199 LBS

## 2025-04-15 LAB
ABSOLUTE EOSINOPHIL (OHS): 0.13 K/UL
ABSOLUTE MONOCYTE (OHS): 1.21 K/UL (ref 0.3–1)
ABSOLUTE NEUTROPHIL COUNT (OHS): 7.86 K/UL (ref 1.8–7.7)
ALBUMIN SERPL BCP-MCNC: 3.3 G/DL (ref 3.5–5.2)
ALP SERPL-CCNC: 89 UNIT/L (ref 40–150)
ALT SERPL W/O P-5'-P-CCNC: 30 UNIT/L (ref 10–44)
ANION GAP (OHS): 6 MMOL/L (ref 8–16)
AST SERPL-CCNC: 29 UNIT/L (ref 11–45)
BASOPHILS # BLD AUTO: 0.1 K/UL
BASOPHILS NFR BLD AUTO: 0.9 %
BILIRUB SERPL-MCNC: 1 MG/DL (ref 0.1–1)
BUN SERPL-MCNC: 14 MG/DL (ref 8–23)
CALCIUM SERPL-MCNC: 8.5 MG/DL (ref 8.7–10.5)
CHLORIDE SERPL-SCNC: 104 MMOL/L (ref 95–110)
CO2 SERPL-SCNC: 24 MMOL/L (ref 23–29)
CREAT SERPL-MCNC: 0.8 MG/DL (ref 0.5–1.4)
ERYTHROCYTE [DISTWIDTH] IN BLOOD BY AUTOMATED COUNT: 13.8 % (ref 11.5–14.5)
GFR SERPLBLD CREATININE-BSD FMLA CKD-EPI: >60 ML/MIN/1.73/M2
GLUCOSE SERPL-MCNC: 100 MG/DL (ref 70–110)
HCT VFR BLD AUTO: 42.5 % (ref 37–48.5)
HGB BLD-MCNC: 14 GM/DL (ref 12–16)
IMM GRANULOCYTES # BLD AUTO: 0.09 K/UL (ref 0–0.04)
IMM GRANULOCYTES NFR BLD AUTO: 0.8 % (ref 0–0.5)
LYMPHOCYTES # BLD AUTO: 1.93 K/UL (ref 1–4.8)
MAGNESIUM SERPL-MCNC: 1.7 MG/DL (ref 1.6–2.6)
MCH RBC QN AUTO: 33.3 PG (ref 27–31)
MCHC RBC AUTO-ENTMCNC: 32.9 G/DL (ref 32–36)
MCV RBC AUTO: 101 FL (ref 82–98)
NUCLEATED RBC (/100WBC) (OHS): 0 /100 WBC
OHS QRS DURATION: 80 MS
OHS QTC CALCULATION: 476 MS
PHOSPHATE SERPL-MCNC: 4.6 MG/DL (ref 2.7–4.5)
PLATELET # BLD AUTO: 220 K/UL (ref 150–450)
PMV BLD AUTO: 12 FL (ref 9.2–12.9)
POTASSIUM SERPL-SCNC: 4.3 MMOL/L (ref 3.5–5.1)
PROT SERPL-MCNC: 5.8 GM/DL (ref 6–8.4)
RBC # BLD AUTO: 4.2 M/UL (ref 4–5.4)
RELATIVE EOSINOPHIL (OHS): 1.1 %
RELATIVE LYMPHOCYTE (OHS): 17 % (ref 18–48)
RELATIVE MONOCYTE (OHS): 10.7 % (ref 4–15)
RELATIVE NEUTROPHIL (OHS): 69.5 % (ref 38–73)
SODIUM SERPL-SCNC: 134 MMOL/L (ref 136–145)
WBC # BLD AUTO: 11.32 K/UL (ref 3.9–12.7)

## 2025-04-15 PROCEDURE — 36415 COLL VENOUS BLD VENIPUNCTURE: CPT | Performed by: STUDENT IN AN ORGANIZED HEALTH CARE EDUCATION/TRAINING PROGRAM

## 2025-04-15 PROCEDURE — 25000003 PHARM REV CODE 250: Performed by: STUDENT IN AN ORGANIZED HEALTH CARE EDUCATION/TRAINING PROGRAM

## 2025-04-15 PROCEDURE — 80053 COMPREHEN METABOLIC PANEL: CPT | Performed by: STUDENT IN AN ORGANIZED HEALTH CARE EDUCATION/TRAINING PROGRAM

## 2025-04-15 PROCEDURE — 83735 ASSAY OF MAGNESIUM: CPT | Performed by: STUDENT IN AN ORGANIZED HEALTH CARE EDUCATION/TRAINING PROGRAM

## 2025-04-15 PROCEDURE — 84100 ASSAY OF PHOSPHORUS: CPT | Performed by: STUDENT IN AN ORGANIZED HEALTH CARE EDUCATION/TRAINING PROGRAM

## 2025-04-15 PROCEDURE — 94761 N-INVAS EAR/PLS OXIMETRY MLT: CPT

## 2025-04-15 PROCEDURE — 85025 COMPLETE CBC W/AUTO DIFF WBC: CPT | Performed by: STUDENT IN AN ORGANIZED HEALTH CARE EDUCATION/TRAINING PROGRAM

## 2025-04-15 RX ORDER — ACYCLOVIR 800 MG/1
800 TABLET ORAL
Qty: 25 TABLET | Refills: 0 | Status: SHIPPED | OUTPATIENT
Start: 2025-04-15 | End: 2025-04-22

## 2025-04-15 RX ADMIN — ACETAMINOPHEN 650 MG: 325 TABLET ORAL at 08:04

## 2025-04-15 RX ADMIN — ACYCLOVIR 800 MG: 800 TABLET ORAL at 09:04

## 2025-04-15 RX ADMIN — ACYCLOVIR 800 MG: 800 TABLET ORAL at 12:04

## 2025-04-15 RX ADMIN — METOPROLOL TARTRATE 50 MG: 50 TABLET, FILM COATED ORAL at 12:04

## 2025-04-15 RX ADMIN — METOPROLOL TARTRATE 50 MG: 50 TABLET, FILM COATED ORAL at 05:04

## 2025-04-15 RX ADMIN — LEVOTHYROXINE SODIUM 150 MCG: 150 TABLET ORAL at 05:04

## 2025-04-15 RX ADMIN — APIXABAN 5 MG: 5 TABLET, FILM COATED ORAL at 09:04

## 2025-04-15 RX ADMIN — PROMETHAZINE HYDROCHLORIDE AND CODEINE PHOSPHATE 5 ML: 6.25; 1 SOLUTION ORAL at 06:04

## 2025-04-15 RX ADMIN — ACYCLOVIR 800 MG: 800 TABLET ORAL at 05:04

## 2025-04-15 NOTE — PLAN OF CARE
[facility]      HOME HEALTH ORDERS  FACE TO FACE ENCOUNTER    Patient Name: Beth Cantu  YOB: 1950    PCP: Bruce Artis MD   PCP Address: [address] / [place] [address]  PCP Phone Number: [phone]  PCP Fax: [phone]    Encounter Date: 4/11/25    Admit to Home Health    Diagnoses:  Active Hospital Problems    Diagnosis  POA    *Typical atrial flutter [I48.3]  Yes     Priority: 1 - High    Herpes simplex virus (HSV) infection [B00.9]  Yes    Anxiety [F41.9]  Yes    Invasive lobular carcinoma of breast in female [C50.919]  Yes    Hypothyroidism associated with surgical procedure [E89.0]  Yes     - sees an endocrinologist in [place], [place].        Resolved Hospital Problems   No resolved problems to display.       Follow Up Appointments:  Future Appointments   Date Time Provider Department Center   4/22/2025  9:15 AM Bruce Artis MD [facility]   4/28/2025 11:00 AM Liza Kate MD [facility] RAD ONC [facility]   7/1/2025  8:30 AM Sarah Eduardo MD [facility]       Allergies:  Review of patient's allergies indicates:   Allergen Reactions    Penicillin Hives and Rash     Other reaction(s): in high school, Skin Rashes/Hives       Medications: Review discharge medications with patient and family and provide education.    Current Medications[1]     Medication List        START taking these medications      acyclovir 800 MG Tab  Commonly known as: ZOVIRAX  Take 1 tablet (800 mg total) by mouth 5 (five) times daily. for 5 days     ELIQUIS 5 mg Tab  Generic drug: apixaban  Take 1 tablet (5 mg total) by mouth 2 (two) times daily.            CONTINUE taking these medications      ALPRAZolam 0.25 MG tablet  Commonly known as: XANAX  Take 1 tablet (0.25 mg total) by mouth nightly as needed for Insomnia.     azelastine 137 mcg (0.1 %) nasal spray  Commonly known as: ASTELIN  2 sprays 2 (two) times daily.     b complex vitamins tablet  Take 1 tablet by  mouth once daily.     levothyroxine 150 MCG tablet  Commonly known as: SYNTHROID  Take 150 mcg by mouth once daily.     LIPO-FLAVONOID ORAL  Take by mouth.     loratadine 10 mg tablet  Commonly known as: CLARITIN  Take 10 mg by mouth once daily.     multivitamin capsule  Take 1 capsule by mouth once daily.     ondansetron 8 MG Tbdl  Commonly known as: ZOFRAN-ODT  dissolve 1 tablet (8 mg total) by mouth every 8 (eight) hours as needed (Nausea).     propranoloL 120 MG 24 hr capsule  Commonly known as: INDERAL LA  once daily.     sumatriptan 25 MG Tab  Commonly known as: IMITREX     vitamin D 1000 units Tab  Commonly known as: VITAMIN D3  Take 1,000 Units by mouth once daily.            STOP taking these medications      anastrozole 1 mg Tab  Commonly known as: ARIMIDEX     aspirin 81 MG EC tablet  Commonly known as: ECOTRIN     oxyCODONE-acetaminophen 5-325 mg per tablet  Commonly known as: PERCOCET                I have seen and examined this patient within the last 30 days. My clinical findings that support the need for the home health skilled services and home bound status are the following:no   Weakness/numbness causing balance and gait disturbance due to Weakness/Debility making it taxing to leave home.     Diet:   regular diet    Labs:  None    Referrals/ Consults  Physical Therapy to evaluate and treat. Evaluate for home safety and equipment needs; Establish/upgrade home exercise program. Perform / instruct on therapeutic exercises, gait training, transfer training, and Range of Motion.  Occupational Therapy to evaluate and treat. Evaluate home environment for safety and equipment needs. Perform/Instruct on transfers, ADL training, ROM, and therapeutic exercises.    Activities:   activity as tolerated    Nursing:   Agency to admit patient within 24 hours of hospital discharge unless specified on physician order or at patient request    SN to complete comprehensive assessment including routine vital signs.  Instruct on disease process and s/s of complications to report to MD. Review/verify medication list sent home with the patient at time of discharge  and instruct patient/caregiver as needed. Frequency may be adjusted depending on start of care date.     Skilled nurse to perform up to 3 visits PRN for symptoms related to diagnosis    Notify MD if SBP > 160 or < 90; DBP > 90 or < 50; HR > 120 or < 50; Temp > 101; O2 < 88%; Other:       Ok to schedule additional visits based on staff availability and patient request on consecutive days within the home health episode.    When multiple disciplines ordered:    Start of Care occurs on Sunday - Wednesday schedule remaining discipline evaluations as ordered on separate consecutive days following the start of care.    Thursday SOC -schedule subsequent evaluations Friday and Monday the following week.     Friday - Saturday SOC - schedule subsequent discipline evaluations on consecutive days starting Monday of the following week.    For all post-discharge communication and subsequent orders please contact patient's primary care physician. If unable to reach primary care physician or do not receive response within 30 minutes, please contact [facility] [place] staff order clarification    Miscellaneous   None    Home Health Aide:  Physical Therapy Twice weekly and Occupational Therapy Three times weekly    Wound Care Orders  no    I certify that this patient is confined to her home and needs physical therapy and occupational therapy.               [1]   Current Facility-Administered Medications   Medication Dose Route Frequency Provider Last Rate Last Admin    acetaminophen tablet 650 mg  650 mg Oral Q8H PRN Arnulfo Sears MD   650 mg at 04/15/25 0827    acyclovir tablet 800 mg  800 mg Oral 5x Daily Arnulfo Sears MD   800 mg at 04/15/25 1210    ALPRAZolam tablet 0.25 mg  0.25 mg Oral Nightly PRN Reginald Dunn MD   0.25 mg at 04/14/25 2137    apixaban tablet 5 mg  5  mg Oral BID Gillies, Connor M, DO   5 mg at 04/15/25 0956    bisacodyL suppository 10 mg  10 mg Rectal Daily PRN Arnulfo Sears MD        levothyroxine tablet 150 mcg  150 mcg Oral Before breakfast Arnulfo Sears MD   150 mcg at 04/15/25 0556    melatonin tablet 6 mg  6 mg Oral Nightly PRN Arnulfo Sears MD        metoprolol tartrate (LOPRESSOR) tablet 50 mg  50 mg Oral Q6H Arnulfo Sears MD   50 mg at 04/15/25 1210    naloxone 0.4 mg/mL injection 0.02 mg  0.02 mg Intravenous PRN Arnulfo Sears MD        ondansetron disintegrating tablet 8 mg  8 mg Oral Q8H PRN Arnulfo Sears MD        polyethylene glycol packet 17 g  17 g Oral Daily PRN Arnulfo Sears MD        prochlorperazine injection Soln 5 mg  5 mg Intravenous Q6H PRN Arnulfo Sears MD        promethazine-codeine 6.25-10 mg/5 ml syrup 5 mL  5 mL Oral Q4H PRN Arnulfo Sears MD   5 mL at 04/15/25 0612    sodium chloride 0.9% flush 10 mL  10 mL Intravenous Q12H PRN Arnulfo Sears MD         Current Outpatient Medications   Medication Sig Dispense Refill    acyclovir (ZOVIRAX) 800 MG Tab Take 1 tablet (800 mg total) by mouth 5 (five) times daily. for 5 days 25 tablet 0    ALPRAZolam (XANAX) 0.25 MG tablet Take 1 tablet (0.25 mg total) by mouth nightly as needed for Insomnia. 30 tablet 4    apixaban (ELIQUIS) 5 mg Tab Take 1 tablet (5 mg total) by mouth 2 (two) times daily. 180 tablet 0    azelastine (ASTELIN) 137 mcg (0.1 %) nasal spray 2 sprays 2 (two) times daily.      b complex vitamins tablet Take 1 tablet by mouth once daily.      inositol/choline/biofla/vitB,C (LIPO-FLAVONOID ORAL) Take by mouth.      levothyroxine (SYNTHROID) 150 MCG tablet Take 150 mcg by mouth once daily.      loratadine (CLARITIN) 10 mg tablet Take 10 mg by mouth once daily.      multivitamin capsule Take 1 capsule by mouth once daily.      ondansetron (ZOFRAN-ODT) 8 MG TbDL dissolve 1 tablet (8 mg total) by mouth every 8 (eight) hours as  needed (Nausea). 20 tablet 1    propranoloL (INDERAL LA) 120 MG 24 hr capsule once daily.      sumatriptan (IMITREX) 25 MG Tab       vitamin D (VITAMIN D3) 1000 units Tab Take 1,000 Units by mouth once daily.

## 2025-04-15 NOTE — NURSING
.AVS virtually reviewed with patient in its entirety with emphasis on diet, medications, follow-up appointments and reasons to return to the ED. Patient also encouraged to utilize their patient portal. Ease and convenience of use reiterated. Education complete and patient voiced understanding. All questions answered. Discharge teaching complete.

## 2025-04-15 NOTE — PLAN OF CARE
Problem: Fall Injury Risk  Goal: Absence of Fall and Fall-Related Injury  Outcome: Not Progressing     Problem: Wound  Goal: Optimal Coping  Outcome: Not Progressing  Goal: Optimal Functional Ability  Outcome: Not Progressing  Goal: Absence of Infection Signs and Symptoms  Outcome: Not Progressing  Goal: Improved Oral Intake  Outcome: Not Progressing  Goal: Optimal Pain Control and Function  Outcome: Not Progressing  Goal: Skin Health and Integrity  Outcome: Not Progressing  Goal: Optimal Wound Healing  Outcome: Not Progressing     Problem: Adult Inpatient Plan of Care  Goal: Plan of Care Review  Outcome: Not Progressing  Goal: Patient-Specific Goal (Individualized)  Outcome: Not Progressing  Goal: Absence of Hospital-Acquired Illness or Injury  Outcome: Not Progressing  Goal: Optimal Comfort and Wellbeing  Outcome: Not Progressing  Goal: Readiness for Transition of Care  Outcome: Not Progressing

## 2025-04-15 NOTE — PLAN OF CARE
Typical atrial flutter  75yoF with a longstanding hx of mitral valve prolapse (sees cardiology at [facility]), Br CA (low risk, on RT and hormone therapy), hypothyroidism, degenerative joint disease s/p multiple joint replacements who is here in new typical atrial flutter. EP is consulted for AFL. S/p CTI ablation on 4/14/2025       Recommendations:  - Continue Eliquis 5mg bid until EP clinic appointment (which we will schedule in 2-3 mths).   - Follow up with General Cardiology for evaluation of mitral regurgitation.   - Thank you for the consult. EP will sign off. Please call with any questions.

## 2025-04-15 NOTE — PLAN OF CARE
[facility]  Discharge Final Note    Primary Care Provider: Bruce Artis MD    Expected Discharge Date: 4/15/2025        Patient was dc home with spouse and [facility]. There were no other needs from case management.     Discharge Plan A and Plan B have been determined by review of patient's clinical status, future medical and therapeutic needs, and coverage/benefits for post-acute care in coordination with multidisciplinary team members.    Future Appointments   Date Time Provider Department Center   4/22/2025  9:15 AM Bruce Artis MD [facility]   4/28/2025 11:00 AM Liza Kate MD [facility] RAD ONC [facility]   7/1/2025  8:30 AM Sarah Eduardo MD [facility]       Final Discharge Note (most recent)       Final Note - 04/15/25 1439          Final Note    Assessment Type Final Discharge Note     Anticipated Discharge Disposition [place]-Health Care [facility]     What phone number can be called within the next 1-3 days to see how you are doing after discharge? [phone] (P)      Hospital Resources/Appts/Education Provided Provided patient/caregiver with written discharge plan information (P)         Post-Acute Status    Post-Acute Authorization Home Health (P)      Home Health Status Set-up Complete/Auth obtained (P)      Coverage MEDICARE - MEDICARE PART A & B (P)      Discharge Delays None known at this time (P)                      Important Message from Medicare             Contact Info       Bruce Artis MD   Specialty: Internal Medicine   Relationship: PCP - General    [address]  [address]  [facility] [address]   Phone: [phone]       Next Steps: Go on 4/22/2025    Instructions: hospital follow up at 9:15 am          AYAAN Ocampo  Case Management  [phone]

## 2025-04-15 NOTE — NURSING TRANSFER
Nursing Transfer Note      4/14/2025   10:22 PM    Nurse giving handoff:radu doyle  Nurse receiving handoff:radu hackett    Reason patient is being transferred: post procedure    Transfer To: 641    Transfer via stretcher,  Transfer with cardiac monitoring    Transported by transport    Transfer Vital Signs:see flowsheets      Telemetry: Rhythm sinus  Order for Tele Monitor? Yes      Medicines sent: n/a    Any special needs or follow-up needed: routine    Patient belongings transferred with patient: No    Chart send with patient: Yes    Notified: spouse    Patient reassessed at: 4/14/25 9981

## 2025-04-15 NOTE — DISCHARGE INSTRUCTIONS
.Our goal at [facility] is to always give you outstanding care and exceptional service. You may receive a survey from [employer] by mail, text or e-mail in the next 24-48 hours asking about the care you received with us. The survey should only take 5-10 minutes to complete and is very important to us.     Your feedback provides us with a way to recognize our staff who work tirelessly to provide the best care! Also, your responses help us learn how to improve when your experience was below our aspiration of excellence. We are always looking for ways to improve your stay. We WILL use your feedback to continue making improvements to help us provide the highest quality care. We keep your personal information and feedback confidential. We appreciate your time completing this survey and can't wait to hear from you!!!    We look forward to your continued care with us! Thanks so much for choosing [facility] for your healthcare needs!

## 2025-04-15 NOTE — ANESTHESIA POSTPROCEDURE EVALUATION
Anesthesia Post Evaluation    Patient: Beth Cantu    Procedure(s) Performed: Procedure(s) (LRB):  Ablation, Atrial Flutter, Typical (N/A)  Transesophageal echo (SIMI) intra-procedure log documentation (N/A)    Final Anesthesia Type: general      Patient location during evaluation: med/surg floor  Patient participation: Yes- Able to Participate  Level of consciousness: awake and alert and oriented  Post-procedure vital signs: reviewed and stable  Pain management: adequate  Airway patency: patent    PONV status at discharge: No PONV  Anesthetic complications: no      Cardiovascular status: stable  Respiratory status: unassisted, spontaneous ventilation and room air  Hydration status: euvolemic  Follow-up not needed.              Vitals Value Taken Time   /76 04/15/25 05:56   Temp 36.7 °C (98.1 °F) 04/15/25 05:48   Pulse 79 04/15/25 05:56   Resp 18 04/15/25 06:12   SpO2 96 % 04/15/25 05:48         No case tracking events are documented in the log.      Pain/Sven Score: Pain Rating Prior to Med Admin: 6 (4/14/2025  9:37 PM)  Pain Rating Post Med Admin: 3 (4/14/2025 10:37 PM)  Sven Score: 9 (4/14/2025  6:30 PM)

## 2025-04-16 PROBLEM — E66.01 SEVERE OBESITY (BMI 35.0-39.9) WITH COMORBIDITY: Status: RESOLVED | Noted: 2023-06-15 | Resolved: 2025-04-16

## 2025-04-16 LAB — BSA FOR ECHO PROCEDURE: 2.07 M2

## 2025-04-16 RX ORDER — LETROZOLE 2.5 MG/1
2.5 TABLET, FILM COATED ORAL DAILY
Qty: 90 TABLET | Refills: 3 | Status: SHIPPED | OUTPATIENT
Start: 2025-04-16 | End: 2026-04-16

## 2025-04-16 NOTE — DISCHARGE SUMMARY
[facility]  Discharge Summary      Patient Name: Beth Cantu  MRN: [id number]  MARIO: [id number]  Patient Class: IP- Inpatient  Admission Date: 4/11/2025  Hospital Length of Stay: 4 days  Discharge Date and Time: 4/15/2025  2:28 PM  Attending Physician: Aliya att. providers found   Discharging Provider: Arnulfo Sears MD  Primary Care Provider: Bruce Artis MD  [facility] Medicine Team: [facility] MED  Arnulfo Sears MD  Primary Care Team: [facility]    HPI:   Mrs. Cantu is a 75-year-old female with a past medical history of breast cancer, hypothyroidism, hip replacement, mitral valve prolapse who presents from clinic with tachycardia.  Patient reports her smart watch has been showing heart rates elevated periodically in January and February of this year, but has been consistently show heart rates in the mid 100s since March.  Patient was recently treated for upper respiratory infection where she had brief episode of dyspnea.  But denies dyspnea, chest pain, lightheadedness, medication changes, dizziness, palpitations, nausea, presyncope, bilateral lower extremity swelling.  Patient was noted to have heart rate in the 160s in clinic and sent to the ED for further evaluation.  Denies previous episodes of abnormal heart rhythms.    In the ED, , /69, afebrile, and on room air.  Labs notable for TSH 3.4, troponin normal, unremarkable BNP/CBC, and unremarkable chest x-ray.  Patient given 5 mg IV metoprolol and 10 mg IV diltiazem without improvement of heart rate.    Procedure(s) (LRB):  Ablation, Atrial Flutter, Typical (N/A)  Transesophageal echo (SIMI) intra-procedure log documentation (N/A)      Hospital Course:   EP consulted.  Patient started on metoprolol without improvement of tachycardia.  Initiated on anticoagulation.  Cardiac ablation with successful improvement of patient's atrial flutter.  Patient noted to have rash on bottom during admission  consistent with HSV.  Patient started on acyclovir given known history of prior HSV.  To complete 7 day course of acyclovir as outpatient.  Continued on apixaban outpatient follow up arranged with EP and primary care.      Patient deemed appropriate for discharge. I personally saw, examined, and evaluated patient prior to departure. Plan discussed with patient, who was agreeable and amenable; medications were discussed and reviewed, outpatient follow-up scheduled, ER precautions were given, all questions were answered to the patient's satisfaction, and Beth Cantu was subsequently discharged.       Goals of Care Treatment Preferences:  Code Status: Full Code      SDOH Screening:  The patient was screened for utility difficulties, food insecurity, transport difficulties, housing insecurity, and interpersonal safety and there were no concerns identified this admission.     Consults:   Consults (From admission, onward)          Status Ordering Provider     Inpatient consult to Electrophysiology  Once        Provider:  (Not yet assigned)    Completed JOSELUIS CARDENAS     Inpatient consult to Cardiology  Once        Provider:  (Not yet assigned)    Completed JOSELUIS CARDENAS            Assessment & Plan  Typical atrial flutter  Patient presenting from clinic with tachycardia.  Smart watch reported episodes of notable tachycardia in January in February, but sustained episodes of tachycardia since March.    - EKG in the ED with atrial flutter 2:1 in the 120s  - Not responsive to IV metoprolol or diltiazem  - TSH normal   - , troponin negative  - Cardiology consulted   -- Continue PO metoprolol, titrate to heart rate goal <110  -- Apixaban for anticoagulation  -- TTE completed  - EP consulted  -- Ablation 4/14  - Telemetry    Hypothyroidism associated with surgical procedure  - Home levothyroxine    Invasive lobular carcinoma of breast in female  - Follows with Oncology outpatient    Anxiety  - Home  "Xanax PRN    Herpes simplex virus (HSV) infection  - h/o dating back to 2016 on chart review  - rash patient noted starting just prior to presentation consistent HSV outbreak  - Acyclovir x5 daily x7 days, sot 4/13        Final Active Diagnoses:    Diagnosis Date Noted POA    PRINCIPAL PROBLEM:  Typical atrial flutter [I48.3] 04/11/2025 Yes    Herpes simplex virus (HSV) infection [B00.9]  Yes    Anxiety [F41.9] 04/12/2025 Yes    Invasive lobular carcinoma of breast in female [C50.919] 09/10/2024 Yes    Hypothyroidism associated with surgical procedure [E89.0]  Yes      Problems Resolved During this Admission:       Discharged Condition: good    Disposition: Home or Self Care    Follow Up:   Follow-up Information       Bruce Artis MD. Go on 4/22/2025.    Specialty: Internal Medicine  Why: hospital follow up at 9:15 am  Contact information:  [address]  [address]  [facility] [id number] [phone]                           Patient Instructions:      CANE FOR HOME USE     Order Specific Question Answer Comments   Type of Cane: Straight    Height: 5' 7" (1.702 m)    Weight: 90.3 kg (199 lb)    Does patient have medical equipment at home? bedside commode    Does patient have medical equipment at home? walker, rolling    Length of need (1-99 months): 12    Please check all that apply: Patient's condition impairs ambulation.      Ambulatory referral/consult to Internal Medicine   Standing Status: Future   Referral Priority: Routine Referral Type: Consultation   Referral Reason: Specialty Services Required   Requested Specialty: Internal Medicine   Number of Visits Requested: 1     Ambulatory referral/consult to Cardiology   Standing Status: Future   Referral Priority: Routine Referral Type: Consultation   Referral Reason: Specialty Services Required   Requested Specialty: Cardiology   Number of Visits Requested: 1     Ambulatory referral/consult to Cardiac Electrophysiology   Standing Status: Future   Referral " Priority: Routine Referral Type: Consultation   Referral Reason: Specialty Services Required   Requested Specialty: Cardiology   Number of Visits Requested: 1     Diet Adult Regular     Notify your health care provider if you experience any of the following:  increased confusion or weakness     Notify your health care provider if you experience any of the following:  persistent dizziness, light-headedness, or visual disturbances     Notify your health care provider if you experience any of the following:  worsening rash     Notify your health care provider if you experience any of the following:  severe persistent headache     Notify your health care provider if you experience any of the following:  difficulty breathing or increased cough     Notify your health care provider if you experience any of the following:  redness, tenderness, or signs of infection (pain, swelling, redness, odor or green/yellow discharge around incision site)     Notify your health care provider if you experience any of the following:  severe uncontrolled pain     Notify your health care provider if you experience any of the following:  persistent nausea and vomiting or diarrhea     Notify your health care provider if you experience any of the following:  temperature >100.4     Activity as tolerated       Significant Diagnostic Studies: Labs: All labs within the past 24 hours have been reviewed    Pending Diagnostic Studies:       None           Medications:  Reconciled Home Medications:      Medication List        START taking these medications      acyclovir 800 MG Tab  Commonly known as: ZOVIRAX  Take 1 tablet (800 mg total) by mouth 5 (five) times daily. for 5 days     ELIQUIS 5 mg Tab  Generic drug: apixaban  Take 1 tablet (5 mg total) by mouth 2 (two) times daily.            CONTINUE taking these medications      ALPRAZolam 0.25 MG tablet  Commonly known as: XANAX  Take 1 tablet (0.25 mg total) by mouth nightly as needed for  Insomnia.     azelastine 137 mcg (0.1 %) nasal spray  Commonly known as: ASTELIN  2 sprays 2 (two) times daily.     b complex vitamins tablet  Take 1 tablet by mouth once daily.     levothyroxine 150 MCG tablet  Commonly known as: SYNTHROID  Take 150 mcg by mouth once daily.     LIPO-FLAVONOID ORAL  Take by mouth.     loratadine 10 mg tablet  Commonly known as: CLARITIN  Take 10 mg by mouth once daily.     multivitamin capsule  Take 1 capsule by mouth once daily.     ondansetron 8 MG Tbdl  Commonly known as: ZOFRAN-ODT  dissolve 1 tablet (8 mg total) by mouth every 8 (eight) hours as needed (Nausea).     propranoloL 120 MG 24 hr capsule  Commonly known as: INDERAL LA  once daily.     sumatriptan 25 MG Tab  Commonly known as: IMITREX     vitamin D 1000 units Tab  Commonly known as: VITAMIN D3  Take 1,000 Units by mouth once daily.            STOP taking these medications      anastrozole 1 mg Tab  Commonly known as: ARIMIDEX     aspirin 81 MG EC tablet  Commonly known as: ECOTRIN     oxyCODONE-acetaminophen 5-325 mg per tablet  Commonly known as: PERCOCET              Indwelling Lines/Drains at time of discharge:   Lines/Drains/Airways       None                   Time spent on the discharge of patient: 35 minutes         Arnulfo Sears MD  Department of Hospital Medicine  [facility]

## 2025-04-16 NOTE — HOSPITAL COURSE
EP consulted.  Patient started on metoprolol without improvement of tachycardia.  Initiated on anticoagulation.  Cardiac ablation with successful improvement of patient's atrial flutter.  Patient noted to have rash on bottom during admission consistent with HSV.  Patient started on acyclovir given known history of prior HSV.  To complete 7 day course of acyclovir as outpatient.  Continued on apixaban outpatient follow up arranged with EP and primary care.      Patient deemed appropriate for discharge. I personally saw, examined, and evaluated patient prior to departure. Plan discussed with patient, who was agreeable and amenable; medications were discussed and reviewed, outpatient follow-up scheduled, ER precautions were given, all questions were answered to the patient's satisfaction, and Beth Cantu was subsequently discharged.

## 2025-04-18 ENCOUNTER — PATIENT MESSAGE (OUTPATIENT)
Dept: HEMATOLOGY/ONCOLOGY | Facility: CLINIC | Age: 75
End: 2025-04-18
Payer: MEDICARE

## 2025-04-22 PROBLEM — Z98.890 HISTORY OF HIP SURGERY: Status: ACTIVE | Noted: 2025-04-22

## 2025-04-23 ENCOUNTER — TELEPHONE (OUTPATIENT)
Dept: ELECTROPHYSIOLOGY | Facility: CLINIC | Age: 75
End: 2025-04-23
Payer: MEDICARE

## 2025-04-23 DIAGNOSIS — I48.3 TYPICAL ATRIAL FLUTTER: Primary | ICD-10-CM

## 2025-04-23 NOTE — TELEPHONE ENCOUNTER
Called pt to do post op irena.Pt scheduled self a new. Informed pt of policy and that she needs a 3m f/u in July.Pt stated she wouldn't be here . I sent message to supervisor.

## 2025-06-16 ENCOUNTER — OFFICE VISIT (OUTPATIENT)
Dept: CARDIOLOGY | Facility: CLINIC | Age: 75
End: 2025-06-16
Payer: MEDICARE

## 2025-06-16 DIAGNOSIS — I48.3 TYPICAL ATRIAL FLUTTER: Primary | ICD-10-CM

## 2025-06-16 DIAGNOSIS — I34.1 MVP (MITRAL VALVE PROLAPSE): ICD-10-CM

## 2025-06-16 DIAGNOSIS — I42.0 DILATED CARDIOMYOPATHY: ICD-10-CM

## 2025-06-16 DIAGNOSIS — I70.0 AORTIC ATHEROSCLEROSIS: ICD-10-CM

## 2025-06-16 PROCEDURE — 99999 PR PBB SHADOW E&M-EST. PATIENT-LVL III: CPT | Mod: PBBFAC,,, | Performed by: INTERNAL MEDICINE

## 2025-06-16 PROCEDURE — 99213 OFFICE O/P EST LOW 20 MIN: CPT | Mod: PBBFAC | Performed by: INTERNAL MEDICINE

## 2025-06-16 NOTE — PROGRESS NOTES
Subjective:   06/16/2025     Patient ID:  Beth Cantu is a 75 y.o. female who presents for evaulation of Atrial Flutter       History of Present Illness    CHIEF COMPLAINT:  Patient presents for a follow-up evaluation after experiencing atrial flutter in 04/2024, which was treated with ablation.    HPI:  Patient experienced atrial flutter in 04/2024, initially detected by smartwatch showing heart rates of 130-140 bpm. An EKG led to ED admission. She was hospitalized for 5 days, during which Dr. Cyrus Quiros performed an ablation. Post-ablation, she reports stable heart rate and SR on smartwatch. She has follow-up with Dr. Ng in 09/2024.    In 09/2023, she was diagnosed with breast cancer. She underwent biopsies and MRIs. On 10/11/2023, she had bilateral lumpectomies for small tumors. In 12/2023, she underwent 5 radiation therapy.    She was prescribed anastrozole but had significant side effects, including nocturnal emesis and diarrhea, leading to discontinuation.    In 02/2024, she underwent left hip replacement surgery performed by Dr. Dionicio Queen at [facility], followed by physical therapy.    She has had mitral valve prolapse since her 20s. She has no thyroid following surgeries in 1973 and 2013 to remove benign growths. She takes Synthroid for thyroid replacement, with the dosage adjusted from 175 to 150 mcg in 08/2023 due to a low TSH level.    She is currently taking Eliquis BID as prescribed for her atrial flutter. She reports tinnitus.    She denies chest pain, tightness, or squeezing in her chest. She denies current problems or feeling unwell. She denies a history of CAD, HTN, DM, and smoking.    CARDIAC HISTORY:  SIMI 04/2024: mildly decreased heart strength, MR  Mitral valve prolapse diagnosed in 20s Atrial flutter diagnosed 04/2024  Hospitalized for 5 days 04/2024  Ablation 04/2024 (Dr. Cyrus Quiros)  EKG 04/14/2024: normal rhythm    MEDICATIONS:  Eliquis 2 tablets daily for atrial  "flutter/fibrillation prevention  Inderal 120 mg daily for migraine prevention and heart rate control Patient is on Synthroid 150 mcg daily for hypothyroidism due to having no thyroid. Her dose was decreased from 175 mcg in August 2023 due to low TSH levels.    TEST RESULTS:  In August 2023, the patient's TSH levels were low. Since then, after adjusting the Synthroid dose, her TSH levels have normalized. A cholesterol test in June 2023 showed an LDL of 68, which was described as "excellent".    IMAGING:  Patient underwent a mammogram in December 2023 that showed something concerning. Follow-up mammogram and echo were performed in January 2024, but the results were unclear. She was advised to return for another check in 6 months. An echo from June 2023 showed similar findings to a more recent SIMI.    MEDICAL HISTORY:  Patient has a history of breast cancer, diagnosed in September 2023. She also experiences tinnitus. She has no thyroid following surgeries in 1973 and 2013.    SURGICAL HISTORY:  Patient underwent a partial thyroidectomy in 1973 for a benign growth. In 2013, she had a total thyroidectomy, also for a benign growth. On October 11, 2023, she underwent a bilateral lumpectomy for breast cancer.   She had a left hip replacement on either February 10 or 11, 2024, at [facility].    FAMILY HISTORY:  Father: CABG 2013 ([facility])    SOCIAL HISTORY:  Alcohol Use: 1 glass of wine at night while watching TV, but has not consumed any since hip surgery    ROS:  General: -fever, -chills, -fatigue, -weight gain, -weight loss  Eyes: -vision changes, -redness, -discharge  ENT: -ear pain, -nasal congestion, -sore throat, +tinnitus  Cardiovascular: -chest pain, -palpitations, -lower extremity edema  Respiratory: -cough, -shortness of breath  Gastrointestinal: -abdominal pain, -nausea, -vomiting, -diarrhea, -constipation, -blood in stool  Genitourinary: -dysuria, -hematuria, -frequency  Musculoskeletal: -joint pain, -muscle " pain  Skin: -rash, -lesion, +easy bruising  Neurological: -headache, -dizziness, -numbness, -tingling  Psychiatric: -anxiety, -depression, -sleep difficulty          Problem List[1]     Review of patient's allergies indicates:   Allergen Reactions    Penicillin Hives and Rash     Other reaction(s): in high school, Skin Rashes/Hives       Current Medications[2]     Objective:   Physical Exam    General: No acute distress. Well-developed. Well-nourished.  Eyes: EOMI. Sclerae anicteric.  HENT: Normocephalic. Atraumatic. Nares patent. Moist oral mucosa.  Cardiovascular: Regular rate. Regular rhythm. No murmurs. No rubs. No gallops. Normal S1, S2.  Respiratory: Normal respiratory effort. Clear to auscultation bilaterally. No rales. No rhonchi. No wheezing.  Musculoskeletal: No  obvious deformity.  Extremities: No lower extremity edema.  Neurological: Alert & oriented x3. No slurred speech. Normal gait.  Psychiatric: Normal mood. Normal affect. Good insight. Good judgment.  Skin: Warm. Dry. No rash.          Vitals:    06/16/25 1423   BP: (P) 128/68     Wt Readings from Last 3 Encounters:   06/16/25 (P) 100 kg (220 lb 7.4 oz)   04/22/25 99.3 kg (219 lb)   04/14/25 90.3 kg (199 lb)     Temp Readings from Last 3 Encounters:   04/22/25 97.8 °F (36.6 °C) (Skin)   04/15/25 98.5 °F (36.9 °C) (Oral)   02/10/25 (!) 95.9 °F (35.5 °C) (Axillary)     BP Readings from Last 3 Encounters:   06/16/25 (P) 128/68   04/22/25 118/70   04/15/25 113/62     Pulse Readings from Last 3 Encounters:   04/22/25 71   04/15/25 79   04/11/25 (!) 160               Lab Results   Component Value Date    CHOL 137 06/19/2023    CHOL 155 08/29/2022    CHOL 141 06/03/2020     Lab Results   Component Value Date    HDL 40 06/19/2023    HDL 56 08/29/2022    HDL 42 06/03/2020     Lab Results   Component Value Date    LDLCALC 68.4 06/19/2023    LDLCALC 76.0 08/29/2022    LDLCALC 61.8 (L) 06/03/2020     Lab Results   Component Value Date    ALT 30 04/15/2025     AST 29 04/15/2025    AST 15 04/14/2025    AST 21 04/13/2025     Lab Results   Component Value Date    CREATININE 0.8 04/15/2025    BUN 14 04/15/2025     (L) 04/15/2025    K 4.3 04/15/2025    CO2 24 04/15/2025    CO2 20 (L) 04/14/2025    CO2 18 (L) 04/13/2025     Lab Results   Component Value Date    HGB 14.0 04/15/2025    HCT 42.5 04/15/2025    HCT 40.5 04/14/2025    HCT 40.5 04/13/2025       Assessment and Plan:   Assessment & Plan    I42.0 Dilated cardiomyopathy  I48.3 Typical atrial flutter  I34.1 MVP (mitral valve prolapse)  I70.0 Aortic atherosclerosis    IMPRESSION:  - Atrial flutter diagnosed in April, treated with ablation by Dr. Quiros.  - Mildly decreased heart strength on transesophageal echocardiogram from April, likely due to rapid heartbeat during atrial flutter.  - Mitral valve leakage noted on previous echocardiogram, potentially related to weakened heart muscle during atrial flutter episode.  - Discussed potential need for future ablations due to risk of recurrence.  - Clarified that bruising from anticoagulation does not typically qualify for alternative treatments like the Watchman device.  - Explained that mitral valve prolapse diagnosis was common in the past but may have been overdiagnosed.    DILATED CARDIOMYOPATHY:  - Advised discontinuing alcohol consumption.  - Recommend continuing current level of physical activity, as no chest pain or tightness is present with exertion.  - Ordered echocardiogram to reassess EF and valve function post-flutter resolution.  - Continued Inderal (propranolol) 120 mg daily for migraine prevention and heart rate control.  - Follow up after echocardiogram results are available.  - Will communicate echocardiogram results via patient portal.    TYPICAL ATRIAL FLUTTER:  - Suggested obtaining a home EKG device or setting up AirXpanders for daily EKG monitoring.  - Instructed to bring any abnormal EKG readings (other than normal sinus rhythm) to the   for cardiologist review.  - Continued Eliquis (apixaban) at current dose of two tablets daily.  - Continue follow-up with Dr. Peterson for rhythm management.  - Continued Inderal (propranolol) 120 mg daily for migraine prevention and heart rate control.    MVP (MITRAL VALVE PROLAPSE):  - Ordered echocardiogram to reassess EF and valve function post-flutter resolution.  - Follow up after echocardiogram results are available.  - Will communicate echocardiogram results via patient portal.    AORTIC ATHEROSCLEROSIS:  - Recommend trying the new estrogen-blocking medication prescribed by the oncologist, despite previous intolerance to a similar drug.        Patient with abnormal echocardiography showing decreased left ventricular function and mitral regurgitation.  She does have a history of mitral valve prolapse.  She will undergo echocardiography to assess her both for persistent LV dysfunction and prolapse/MR.  Hopefully this will all have resolved and was secondary to atrial flutter with rapid ventricular response.  Follow up in about 6 months (around 12/16/2025).        Future Appointments   Date Time Provider Department Center   7/28/2025  1:45 PM CV [facility] [place]   9/22/2025  8:30 AM Chante Orosco NP [facility] [id number] [address]   9/22/2025  9:40 AM Cyrus Peterson MD [facility] [address]   10/30/2025  2:15 PM Bruce Artis MD [facility]   12/16/2025  1:20 PM Rob Judge Jr., MD [facility] CARDIO [place]       This note was generated with the assistance of ambient listening technology. Verbal consent was obtained by the patient and accompanying visitor(s) for the recording of patient appointment to facilitate this note. I attest to having reviewed and edited the generated note for accuracy, though some syntax or spelling errors may persist. Please contact the author of this note for any clarification.                      [1]   Patient Active Problem List  Diagnosis    Acquired  absence of both cervix and uterus (1986)    Migraine    MVP (mitral valve prolapse)    Hypothyroidism associated with surgical procedure    Invasive lobular carcinoma of breast in female    At risk for lymphedema    Malignant neoplasm of upper-outer quadrant of right breast in female, estrogen receptor positive    Aortic atherosclerosis    Primary osteoarthritis of left hip    Unilateral primary osteoarthritis, left hip    Typical atrial flutter    Anxiety    Herpes simplex virus (HSV) infection    History of hip surgery   [2]   Current Outpatient Medications:     ALPRAZolam (XANAX) 0.25 MG tablet, Take 1 tablet (0.25 mg total) by mouth nightly as needed for Insomnia., Disp: 30 tablet, Rfl: 4    apixaban (ELIQUIS) 5 mg Tab, Take 1 tablet (5 mg total) by mouth 2 (two) times daily., Disp: 180 tablet, Rfl: 2    azelastine (ASTELIN) 137 mcg (0.1 %) nasal spray, 2 sprays 2 (two) times daily., Disp: , Rfl:     b complex vitamins tablet, Take 1 tablet by mouth once daily., Disp: , Rfl:     inositol/choline/biofla/vitB,C (LIPO-FLAVONOID ORAL), Take by mouth., Disp: , Rfl:     levothyroxine (SYNTHROID) 150 MCG tablet, Take 150 mcg by mouth once daily., Disp: , Rfl:     loratadine (CLARITIN) 10 mg tablet, Take 10 mg by mouth once daily., Disp: , Rfl:     ondansetron (ZOFRAN-ODT) 8 MG TbDL, dissolve 1 tablet (8 mg total) by mouth every 8 (eight) hours as needed (Nausea)., Disp: 20 tablet, Rfl: 1    propranoloL (INDERAL LA) 120 MG 24 hr capsule, once daily., Disp: , Rfl:     sumatriptan (IMITREX) 25 MG Tab, , Disp: , Rfl:     vitamin D (VITAMIN D3) 1000 units Tab, Take 1,000 Units by mouth once daily., Disp: , Rfl:

## 2025-06-26 ENCOUNTER — EXTERNAL HOME HEALTH (OUTPATIENT)
Dept: HOME HEALTH SERVICES | Facility: HOSPITAL | Age: 75
End: 2025-06-26
Payer: MEDICARE

## 2025-08-06 ENCOUNTER — PATIENT MESSAGE (OUTPATIENT)
Dept: HEMATOLOGY/ONCOLOGY | Facility: CLINIC | Age: 75
End: 2025-08-06
Payer: MEDICARE

## 2025-08-13 DIAGNOSIS — C50.411 MALIGNANT NEOPLASM OF UPPER-OUTER QUADRANT OF RIGHT BREAST IN FEMALE, ESTROGEN RECEPTOR POSITIVE: Primary | ICD-10-CM

## 2025-08-13 DIAGNOSIS — Z17.0 MALIGNANT NEOPLASM OF UPPER-OUTER QUADRANT OF RIGHT BREAST IN FEMALE, ESTROGEN RECEPTOR POSITIVE: Primary | ICD-10-CM

## 2025-08-13 RX ORDER — EXEMESTANE 25 MG/1
25 TABLET ORAL DAILY
Qty: 30 TABLET | Refills: 11 | Status: SHIPPED | OUTPATIENT
Start: 2025-08-13 | End: 2026-08-13

## 2025-09-05 DIAGNOSIS — I48.0 PAROXYSMAL ATRIAL FIBRILLATION: Primary | ICD-10-CM

## (undated) DEVICE — UNDERGLOVES BIOGEL PI SIZE 8.5

## (undated) DEVICE — DRAPE STERI INSTRUMENT 1018

## (undated) DEVICE — SUT VICRYL PLUS 4-0 PS2 27

## (undated) DEVICE — PAD DEFIB CADENCE ADULT R2

## (undated) DEVICE — SOL IRRI STRL WATER 1000ML

## (undated) DEVICE — DRAPE CAMERA/VIDEO 5 X 96

## (undated) DEVICE — GOWN ECLIPSE REINF LVL4 TWL LG

## (undated) DEVICE — PALAMIX DUO

## (undated) DEVICE — PAD GROUND UNIV STYLE CORD 9IN

## (undated) DEVICE — COVER PROBE US 5.5X58L NON LTX

## (undated) DEVICE — NDL 21GA X1 1/2 REG BEVEL

## (undated) DEVICE — COVER MAYO STND XL 30X57IN

## (undated) DEVICE — UNDERGLOVES BIOGEL PI SZ 7 LF

## (undated) DEVICE — R CATH BIDIRECTIONL DF CRV 7FR

## (undated) DEVICE — SUT ETHIBOND EXCEL 5-0 V-40

## (undated) DEVICE — CONTAINER SPEC OR STRL 4.5OZ

## (undated) DEVICE — ELECTRODE REM PLYHSV RETURN 9

## (undated) DEVICE — TRAY CATH 1-LYR URIMTR 16FR

## (undated) DEVICE — SPONGE GAUZE 16PLY 4X4

## (undated) DEVICE — UNDERPAD ULTRASORB 300LB 30X36

## (undated) DEVICE — GLOVE BIOGEL SKINSENSE PI 6.0

## (undated) DEVICE — Device

## (undated) DEVICE — DRAPE STERI U-SHAPED 47X51IN

## (undated) DEVICE — BRA CLASSIC COMFORT 46 BEIGE

## (undated) DEVICE — GLOVE BIOGEL PI ORTHO PRO 8.5

## (undated) DEVICE — NDL HYPO REG 25G X 1 1/2

## (undated) DEVICE — BANDAGE CURAD ADH FABRIC 1X3IN

## (undated) DEVICE — KIT BONE CEMENT PREPARATION

## (undated) DEVICE — COVER HD BACK TABLE 6FT

## (undated) DEVICE — BLADE SAG DUAL 18MMX1.27MMX90M

## (undated) DEVICE — INTRODUCER HEMOSTASIS 7.5F

## (undated) DEVICE — ELECTRODE BLADE INSULATED 1 IN

## (undated) DEVICE — TOWEL OR DISP STRL BLUE 4/PK

## (undated) DEVICE — DRAPE HIP PCH 112X137X89IN

## (undated) DEVICE — SYR SALINE FLSH PRFL STRL 10ML

## (undated) DEVICE — MARGIN MARKER STANDARD 6 COLOR

## (undated) DEVICE — POSITIONER IV ARMBOARD FOAM

## (undated) DEVICE — ADHESIVE DERMABOND ADVANCED

## (undated) DEVICE — UNDERGLOVES BIOGEL PI SIZE 8

## (undated) DEVICE — SPONGE LAP 18X18 PREWASHED

## (undated) DEVICE — DRAPE INCISE IOBAN 2 23X17IN

## (undated) DEVICE — DRAPE THREE-QTR REINF 53X77IN

## (undated) DEVICE — PULSAVAC ZIMMER

## (undated) DEVICE — DRESSING ABSRBNT ISLAND 3.6X8

## (undated) DEVICE — CATH SAFIRE BI-DIR 7FR LRG

## (undated) DEVICE — SHEATH HEMOSTASIS 8.5FR

## (undated) DEVICE — ALCOHOL ISOPROPYL BLU 70% 16OZ

## (undated) DEVICE — UNDERGLOVE BIOGEL PI SZ 6.5 LF

## (undated) DEVICE — WRAP COBAN NL STRL 4INX5YD

## (undated) DEVICE — SUT VICRYL PLUS 2-0 CT1 18

## (undated) DEVICE — PENCIL ELECTROSURG HOLST W/BLD

## (undated) DEVICE — SUT VICRYL+ 1 CTX 18IN VIOL

## (undated) DEVICE — GOWN ORBIS LVL 4 XXL 49IN

## (undated) DEVICE — SPONGE BULKEE II ABSRB 6X6.75

## (undated) DEVICE — SYR 10CC LUER LOCK

## (undated) DEVICE — COVER PROBE SHEATH SURG 6X3IN

## (undated) DEVICE — HOOD T7 W/ PEEL AWAY LENS

## (undated) DEVICE — GLOVE BIOGEL SKINSENSE PI 7.0

## (undated) DEVICE — KIT ENSITE ELECTRODE SURFACE

## (undated) DEVICE — SUT QUILL 2T11 36IN

## (undated) DEVICE — SOL NACL IRR 3000ML

## (undated) DEVICE — GLOVE BIOGEL SKINSENSE PI 7.5

## (undated) DEVICE — GLOVE BIOGEL SKINSENSE PI 6.5

## (undated) DEVICE — SHEATH GUIDE SCOUT SURG RADAR

## (undated) DEVICE — CATH TRICUSPID HALO XP 7FRX110

## (undated) DEVICE — PACK EP DRAPE OMC

## (undated) DEVICE — APPLICATOR CHLORAPREP ORN 26ML

## (undated) DEVICE — INTRO 8.5FR 63CM SRO

## (undated) DEVICE — KIT PROBE COVER WITH GEL

## (undated) DEVICE — SOL IRR SOD CHL .9% POUR

## (undated) DEVICE — SUT VICRYL 3-0 27 SH

## (undated) DEVICE — STAPLER SKIN PROXIMATE WIDE